# Patient Record
Sex: FEMALE | Race: WHITE | NOT HISPANIC OR LATINO | Employment: FULL TIME | URBAN - METROPOLITAN AREA
[De-identification: names, ages, dates, MRNs, and addresses within clinical notes are randomized per-mention and may not be internally consistent; named-entity substitution may affect disease eponyms.]

---

## 2017-02-23 ENCOUNTER — ALLSCRIPTS OFFICE VISIT (OUTPATIENT)
Dept: OTHER | Facility: OTHER | Age: 47
End: 2017-02-23

## 2017-02-23 PROCEDURE — G0145 SCR C/V CYTO,THINLAYER,RESCR: HCPCS | Performed by: OBSTETRICS & GYNECOLOGY

## 2017-02-23 PROCEDURE — 87624 HPV HI-RISK TYP POOLED RSLT: CPT | Performed by: OBSTETRICS & GYNECOLOGY

## 2017-02-24 ENCOUNTER — GENERIC CONVERSION - ENCOUNTER (OUTPATIENT)
Dept: OTHER | Facility: OTHER | Age: 47
End: 2017-02-24

## 2017-02-24 ENCOUNTER — LAB REQUISITION (OUTPATIENT)
Dept: LAB | Facility: HOSPITAL | Age: 47
End: 2017-02-24
Payer: COMMERCIAL

## 2017-02-24 DIAGNOSIS — Z11.51 ENCOUNTER FOR SCREENING FOR HUMAN PAPILLOMAVIRUS (HPV): ICD-10-CM

## 2017-02-24 DIAGNOSIS — Z12.4 ENCOUNTER FOR SCREENING FOR MALIGNANT NEOPLASM OF CERVIX: ICD-10-CM

## 2017-02-28 LAB — HPV RRNA GENITAL QL NAA+PROBE: ABNORMAL

## 2017-03-01 LAB
LAB AP GYN PRIMARY INTERPRETATION: NORMAL
LAB AP LMP: NORMAL
Lab: NORMAL
PATH INTERP SPEC-IMP: NORMAL

## 2017-03-02 ENCOUNTER — GENERIC CONVERSION - ENCOUNTER (OUTPATIENT)
Dept: OTHER | Facility: OTHER | Age: 47
End: 2017-03-02

## 2017-03-10 ENCOUNTER — GENERIC CONVERSION - ENCOUNTER (OUTPATIENT)
Dept: OTHER | Facility: OTHER | Age: 47
End: 2017-03-10

## 2017-03-15 ENCOUNTER — GENERIC CONVERSION - ENCOUNTER (OUTPATIENT)
Dept: OTHER | Facility: OTHER | Age: 47
End: 2017-03-15

## 2017-04-06 ENCOUNTER — ALLSCRIPTS OFFICE VISIT (OUTPATIENT)
Dept: OTHER | Facility: OTHER | Age: 47
End: 2017-04-06

## 2017-04-06 LAB — HCG, QUALITATIVE (HISTORICAL): NEGATIVE

## 2017-05-04 ENCOUNTER — HOSPITAL ENCOUNTER (OUTPATIENT)
Dept: MAMMOGRAPHY | Facility: MEDICAL CENTER | Age: 47
Discharge: HOME | End: 2017-05-04
Payer: COMMERCIAL

## 2017-05-04 DIAGNOSIS — Z12.31 ENCOUNTER FOR SCREENING MAMMOGRAM FOR MALIGNANT NEOPLASM OF BREAST: ICD-10-CM

## 2017-05-04 PROCEDURE — G0202 SCR MAMMO BI INCL CAD: HCPCS

## 2017-05-04 PROCEDURE — 77063 BREAST TOMOSYNTHESIS BI: CPT

## 2017-05-09 ENCOUNTER — TRANSCRIBE ORDERS (OUTPATIENT)
Dept: ADMINISTRATIVE | Facility: HOSPITAL | Age: 47
End: 2017-05-09

## 2017-05-09 ENCOUNTER — ALLSCRIPTS OFFICE VISIT (OUTPATIENT)
Dept: OTHER | Facility: OTHER | Age: 47
End: 2017-05-09

## 2017-05-09 DIAGNOSIS — Z12.39 SCREENING BREAST EXAMINATION: Primary | ICD-10-CM

## 2017-11-06 ENCOUNTER — ALLSCRIPTS OFFICE VISIT (OUTPATIENT)
Dept: OTHER | Facility: OTHER | Age: 47
End: 2017-11-06

## 2017-11-06 DIAGNOSIS — Z13.0 ENCOUNTER FOR SCREENING FOR DISEASES OF THE BLOOD AND BLOOD-FORMING ORGANS AND CERTAIN DISORDERS INVOLVING THE IMMUNE MECHANISM: ICD-10-CM

## 2017-11-06 DIAGNOSIS — Z13.220 ENCOUNTER FOR SCREENING FOR LIPOID DISORDERS: ICD-10-CM

## 2017-11-06 DIAGNOSIS — R87.610 ATYPICAL SQUAMOUS CELLS OF UNDETERMINED SIGNIFICANCE ON CYTOLOGIC SMEAR OF CERVIX (ASC-US): ICD-10-CM

## 2017-11-30 ENCOUNTER — ALLSCRIPTS OFFICE VISIT (OUTPATIENT)
Dept: OTHER | Facility: OTHER | Age: 47
End: 2017-11-30

## 2017-11-30 PROCEDURE — G0145 SCR C/V CYTO,THINLAYER,RESCR: HCPCS | Performed by: OBSTETRICS & GYNECOLOGY

## 2017-12-04 ENCOUNTER — LAB REQUISITION (OUTPATIENT)
Dept: LAB | Facility: HOSPITAL | Age: 47
End: 2017-12-04
Payer: COMMERCIAL

## 2017-12-04 DIAGNOSIS — R87.610 ATYPICAL SQUAMOUS CELLS OF UNDETERMINED SIGNIFICANCE ON CYTOLOGIC SMEAR OF CERVIX (ASC-US): ICD-10-CM

## 2017-12-05 NOTE — PROGRESS NOTES
Assessment    1  ASCUS with positive high risk HPV cervical (795 01,795 05) (R87 610,R87 810)    Discussion/Summary  Discussion Summary:   I explained that if she has anything other than a normal Pap and a negative HPV she needs to have a colposcopy  She is concerned because of her IUD, but I explained that a colposcopy and even a LEEP could be done with the IUD in place  Chief Complaint    1  Visit For: Preventive General Multisystem Exam    History of Present Illness  HPI: Patient here for Re Pap, she had ASCUS with positive HPV and I have recommended colposcopy but she was very reluctant to do so as she has had many abnormal Paps and colposcopies over the years  She had a LEEP many years ago and then in late 2013 she had EMILI 1-2 on colposcopy and we were planning to do another LEEP  She never had this done  When she came in the next time her Pap was normal  She did actually have 3 normal Paps but this year had ASCUS with positive HPV  Review of Systems  Focused-Female:   Constitutional: No fever, no chills, feels well, no tiredness, no recent weight gain or loss  ENT: no ear ache, no loss of hearing, no nosebleeds or nasal discharge, no sore throat or hoarseness  Cardiovascular: no complaints of slow or fast heart rate, no chest pain, no palpitations, no leg claudication or lower extremity edema  Respiratory: no complaints of shortness of breath, no wheezing, no dyspnea on exertion, no orthopnea or PND  Breasts: no complaints of breast pain, breast lump or nipple discharge  Gastrointestinal: no complaints of abdominal pain, no constipation, no nausea or diarrhea, no vomiting, no bloody stools  Genitourinary: no complaints of dysuria, no incontinence, no pelvic pain, no dysmenorrhea, no vaginal discharge or abnormal vaginal bleeding  Musculoskeletal: no complaints of arthralgia, no myalgia, no joint swelling or stiffness, no limb pain or swelling     Integumentary: no complaints of skin rash or lesion, no itching or dry skin, no skin wounds  Neurological: no complaints of headache, no confusion, no numbness or tingling, no dizziness or fainting  ROS Reviewed:   ROS reviewed  Active Problems    1  Annual physical exam (V70 0) (Z00 00)   2  Anxiety (300 00) (F41 9)   3  Breast cancer (174 9) (C50 919)   4  Breast cancer screening, high risk patient (V76 11) (Z12 31)   5  Breast self examination education, encounter for (V65 49) (Z71 89)   6  Encounter for IUD insertion (V25 11) (Z30 430)   7  Encounter for screening for diabetes mellitus (V77 1) (Z13 1)   8  Encounter for screening for lipid disorder (V77 91) (Z13 220)   9  Encounter for special screening examination for genitourinary disorder (V81 6) (Z13 89)   10  History of thrombocytosis (V12 3) (Z86 2)   11  Need for immunization against influenza (V04 81) (Z23)   12  Pap smear abnormality of cervix with ASCUS favoring benign (795 01) (R87 610)   13  Screening for deficiency anemia (V78 1) (Z13 0)   14  Screening for thyroid disorder (V77 0) (Z13 29)   15  Secondary amenorrhea (626 0) (N91 1)   16  Vitamin D deficiency (268 9) (E55 9)    Past Medical History    1  History of Abnormal Pap Smear: ASCUS Favor Dysplasia (795 02)   2  History of Age At First Period 17 Years Old (Menarche)   3  History of Bacterial vaginosis (616 10,041 9) (N76 0,B96 89)   4  Breast self examination education, encounter for (V65 49) (Z71 89)   5  History of Cervical cancer screening (V76 2) (Z12 4)   6  History of Encounter for cervical Pap smear with pelvic exam (V76 2,V72 31) (Z01 419)   7  History of  0 (V49 89)   8  History of infection due to human papilloma virus (HPV) (V12 09) (Z86 19)   9  History of ovarian cyst (V13 29) (Z87 42)   10  History of thrombocytosis (V12 3) (Z86 2)   11  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)   12  History of Moderate dysplasia of cervix (EMILI II) (622 12) (N87 1)   13  History of Never Pregnant   14  History of Radiation Therapy (V15 3)   15  History of Screening for HPV (human papillomavirus) (V73 81) (Z11 51)   16  History of Use of tamoxifen (Nolvadex) (V07 51) (Z79 810)   17  History of Visit for routine gyn exam (V72 31) (Z01 419)  Active Problems And Past Medical History Reviewed: The active problems and past medical history were reviewed and updated today  Surgical History    1  History of Breast Surgery Lumpectomy   2  History of Breast Surgery Reconstruction   3  History of Cervical Loop Electrosurgical Excision (LEEP)   4  History of Colposcopy Cervix With Biopsy(S) With Endocervical Curettage   5  History of [place] Lymph Node Biopsy  Surgical History Reviewed: The surgical history was reviewed and updated today  Family History  Mother    1  No pertinent family history  Father    2  No pertinent family history  Paternal Grandmother    1  Family history of Ovarian Cancer (V16 41)  Family History Reviewed: The family history was reviewed and updated today  Social History    · Being A Social Drinker   · Caffeine Use   · Former smoker (V1 82) (K41 358)   · Denied: History of    · Denied: History of drug use   · Marital History -  (V61 03)   · [address]   · Uses Safety Equipment - Seatbelts  Social History Reviewed: The social history was reviewed and updated today  Current Meds   1  Multivitamins CAPS; Therapy: (Recorded:[id number]) to Recorded  Medication List Reviewed: The medication list was reviewed and updated today  Allergies    1  Cephalexin CAPS    2  No Known Environmental Allergies   3  No Known Food Allergies    Vitals  Vital Signs    Recorded: 66ENG0182 19:93UR   Systolic 388, LUE, Sitting   Diastolic 75, LUE, Sitting   Height 5 ft 3 in   Weight 107 lb    BMI Calculated 18 95   BSA Calculated 1 48   LMP [id number]     Physical Exam    Genitourinary   External genitalia: Normal and no lesions appreciated      Vagina: Normal, no lesions or dryness appreciated  Urethra: Normal     Urethral meatus: Normal     Bladder: Normal, soft, non-tender and no prolapse or masses appreciated  Cervix: Normal, no palpable masses  strings visible  Uterus: Normal, non-tender, not enlarged, and no palpable masses  Adnexa/parametria: Normal, non-tender and no fullness or masses appreciated  Future Appointments    Date/Time Provider Specialty Site   05/21/2018 03:45 PM PHONG Payton  Surgical Oncology [facility] [place]     Signatures   Electronically signed by :  Rina Hay DO; Nov 30 2017  8:59PM EST                       (Author)

## 2017-12-11 LAB
LAB AP GYN PRIMARY INTERPRETATION: NORMAL
LAB AP LMP: NORMAL
Lab: NORMAL

## 2017-12-12 DIAGNOSIS — Z12.4 ENCOUNTER FOR SCREENING FOR MALIGNANT NEOPLASM OF CERVIX: ICD-10-CM

## 2017-12-12 DIAGNOSIS — Z00.00 ENCOUNTER FOR GENERAL ADULT MEDICAL EXAMINATION WITHOUT ABNORMAL FINDINGS: ICD-10-CM

## 2017-12-12 DIAGNOSIS — D75.1 SECONDARY POLYCYTHEMIA: ICD-10-CM

## 2017-12-12 PROCEDURE — 87624 HPV HI-RISK TYP POOLED RSLT: CPT | Performed by: OBSTETRICS & GYNECOLOGY

## 2017-12-13 ENCOUNTER — GENERIC CONVERSION - ENCOUNTER (OUTPATIENT)
Dept: OTHER | Facility: OTHER | Age: 47
End: 2017-12-13

## 2017-12-13 LAB — HPV I/H RISK 1 DNA CVX QL PROBE+SIG AMP: ABNORMAL

## 2017-12-22 ENCOUNTER — GENERIC CONVERSION - ENCOUNTER (OUTPATIENT)
Dept: OTHER | Facility: OTHER | Age: 47
End: 2017-12-22

## 2018-01-05 ENCOUNTER — ALLSCRIPTS OFFICE VISIT (OUTPATIENT)
Dept: OTHER | Facility: OTHER | Age: 48
End: 2018-01-05

## 2018-01-06 NOTE — PROGRESS NOTES
Assessment   1  Breast cancer (174 9) (C50 919)   2  Erythrocytosis (289 0) (D75 1)   3  Colon cancer screening (V76 51) (Z12 11)    Plan   Annual physical exam, Erythrocytosis    · (1) CBC/PLT/DIFF; Status:Active; Requested TXM:[id number];   Colon cancer screening    · *1 -  [facility]  *  Status: Active     Requested for: [id number]  Care Summary provided  : Yes    Discussion/Summary   Discussion Summary:    Recent gyn cancer Hematology Oncology consult were reviewed  Patient will be getting colposcopy  there is no clinical evidence of recurrence of breast cancer  Given breast cancer in early age and family members with history of colon cancer and ovarian cancer GI consultation for consideration of colonoscopy is recommended  Repeat lab studies as above  Counseling Documentation With Imm: The patient was counseled regarding diagnostic results,-- instructions for management,-- risk factor reductions,-- patient and family education,-- impressions,-- importance of compliance with treatment  total time of encounter was 25 minutes-- and-- >than50% minutes was spent counseling  Chief Complaint   Chief Complaint Free Text Note Form: Follow up      History of Present Illness   HPI: Patient with history of breast cancer stage I ER AL positive her 2- BRCA negative at the age of 42 status post partial mastectomy is here to follow-up on Routine appointment  her recent lab studies shows stable thrombocytosis with slightly elevated hemoglobin  patient denies any back pain dysuria frequency her urine test was negative for any hematuria  Patient believes she has not been drinking enough water  Review of Systems   Complete-Female:      Constitutional: no fever,-- no chills-- and-- no recent weight loss  Cardiovascular: no chest pain-- and-- no palpitations  Respiratory: no cough-- and-- no shortness of breath during exertion        Gastrointestinal: constipation, but-- no abdominal pain,-- no nausea-- and-- no diarrhea  Genitourinary: no dysuria-- and-- no unexplained vaginal bleeding  Neurological: no headache-- and-- no confusion  Hematologic/Lymphatic: as noted in HPI  Active Problems   1  Annual physical exam (V70 0) (Z00 00)   2  Anxiety (300 00) (F41 9)   3  ASCUS with positive high risk HPV cervical (795 01,795 05) (R87 610,R87 810)   4  Breast cancer (174 9) (C50 919)   5  Breast cancer screening, high risk patient (V76 11) (Z12 31)   6  Breast self examination education, encounter for (V65 49) (Z71 89)   7  Cervical cancer screening (V76 2) (Z12 4)   8  Encounter for IUD insertion (V25 11) (Z30 430)   9  Encounter for screening for diabetes mellitus (V77 1) (Z13 1)   10  Encounter for screening for lipid disorder (V77 91) (Z13 220)   11  Encounter for special screening examination for genitourinary disorder (V81 6) (Z13 89)   12  History of thrombocytosis (V12 3) (Z86 2)   13  Need for immunization against influenza (V04 81) (Z23)   14  Pap smear abnormality of cervix with ASCUS favoring benign (795 01) (R87 610)   15  Screening for deficiency anemia (V78 1) (Z13 0)   16  Screening for thyroid disorder (V77 0) (Z13 29)   17  Secondary amenorrhea (626 0) (N91 1)   18  Vitamin D deficiency (268 9) (E55 9)    Past Medical History   1  History of Abnormal Pap Smear: ASCUS Favor Dysplasia (795 02)   2  History of Age At First Period 17 Years Old (Menarche)   3  History of Bacterial vaginosis (616 10,041 9) (N76 0,B96 89)   4  Breast self examination education, encounter for (V65 49) (Z71 89)   5  History of Cervical cancer screening (V76 2) (Z12 4)   6  History of Encounter for cervical Pap smear with pelvic exam (V76 2,V72 31) (Z01 419)   7  History of  0 (V49 89)   8  History of infection due to human papilloma virus (HPV) (V12 09) (Z86 19)   9  History of ovarian cyst (V13 29) (Z87 42)   10   History of thrombocytosis (V12 3) (Z86 2)   11  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)   12  History of Moderate dysplasia of cervix (EMILI II) (622 12) (N87 1)   13  History of Never Pregnant   14  History of Radiation Therapy (V15 3)   15  History of Screening for HPV (human papillomavirus) (V73 81) (Z11 51)   16  History of Use of tamoxifen (Nolvadex) (V07 51) (Z79 810)   17  History of Visit for routine gyn exam (V72 31) (Z01 419)  Active Problems And Past Medical History Reviewed: The active problems and past medical history were reviewed and updated today  Surgical History   1  History of Breast Surgery Lumpectomy   2  History of Breast Surgery Reconstruction   3  History of Cervical Loop Electrosurgical Excision (LEEP)   4  History of Colposcopy Cervix With Biopsy(S) With Endocervical Curettage   5  History of [place] Lymph Node Biopsy  Surgical History Reviewed: The surgical history was reviewed and updated today  Family History   Mother    1  No pertinent family history  Father    2  No pertinent family history  Paternal Grandmother    1  Family history of Ovarian Cancer (V16 41)  Family History Reviewed: The family history was reviewed and updated today  Social History    · Being A Social Drinker   · Caffeine Use   · Former smoker (V15 82) (J57 131)   · Denied: History of    · Denied: History of drug use   · Marital History -  (V61 03)   · [address]   · Uses Safety Equipment - Seatbelts  Social History Reviewed: The social history was reviewed and updated today  Current Meds    1  Multivitamins CAPS; Therapy: (Recorded:67Uyi2142) to Recorded  Medication List Reviewed: The medication list was reviewed and updated today  Allergies   1  Cephalexin CAPS  2  No Known Environmental Allergies   3   No Known Food Allergies    Vitals   Vital Signs    Recorded: 36UDQ2978 02:38PM   Heart Rate 80   Systolic 803   Diastolic 58   Height 5 ft 3 in   Weight 110 lb 6 oz   BMI Calculated 19 55   BSA Calculated 1 5   O2 Saturation 99     Physical Exam        Constitutional      General appearance: No acute distress, well appearing and well nourished  Eyes      Conjunctiva and lids: No swelling, erythema or discharge  Pupils and irises: Equal, round and reactive to light  Ears, Nose, Mouth, and Throat      Otoscopic examination: Tympanic membranes translucent with normal light reflex  Canals patent without erythema  Oropharynx: Normal with no erythema, edema, exudate or lesions  Pulmonary      Respiratory effort: No increased work of breathing or signs of respiratory distress  Auscultation of lungs: Clear to auscultation  Cardiovascular      Auscultation of heart: Normal rate and rhythm, normal S1 and S2, without murmurs  Examination of extremities for edema and/or varicosities: Normal        Carotid pulses: Normal        Abdomen      Abdomen: Non-tender, no masses  Liver and spleen: No hepatomegaly or splenomegaly  Lymphatic      Palpation of lymph nodes in neck: No lymphadenopathy  -- No thyromegaly  Musculoskeletal      Gait and station: Normal        Digits and nails: Normal without clubbing or cyanosis  Inspection/palpation of joints, bones, and muscles: Normal        Neurologic      Cranial nerves: Cranial nerves 2-12 intact  Reflexes: 2+ and symmetric  Sensation: No sensory loss  Psychiatric      Orientation to person, place, and time: Normal        Mood and affect: Abnormal  -- Anxious on talking about past medical history  Results/Data   PHQ-2 Adult Depression Screening 20MJB6037 02:40PM User, s      Test Name Result Flag Reference   PHQ-2 Adult Depression Score 0     Over the last two weeks, how often have you been bothered by any of the following problems?      Little interest or pleasure in doing things: Not at all - 0     Feeling down, depressed, or hopeless: Not at all - 0   PHQ-2 Adult Depression Screening Negative          Future Appointments      Date/Time Provider Specialty Site   02/15/2018 04:30 PM Rose Yuan DO Obstetrics/Gynecology [facility]   05/21/2018 03:45 PM PHONG Silva   Surgical Oncology [facility] [place]     Signatures    Electronically signed by : PHONG Vanegas ; Jan 5 2018  4:51PM EST                       (Author)

## 2018-01-12 VITALS
DIASTOLIC BLOOD PRESSURE: 64 MMHG | WEIGHT: 107 LBS | BODY MASS INDEX: 18.96 KG/M2 | HEIGHT: 63 IN | TEMPERATURE: 99.3 F | SYSTOLIC BLOOD PRESSURE: 114 MMHG | OXYGEN SATURATION: 98 % | RESPIRATION RATE: 15 BRPM | HEART RATE: 81 BPM

## 2018-01-12 NOTE — MISCELLANEOUS
Message   Recorded as Task   Date: 02/23/2017 04:31 PM, Created By: Sallie Goldman   Task Name: Go to Result   Assigned To: Nidia Dinh   Regarding Patient: NAIN DANIELS, Status: In Progress   Comment:    Sallie Goldman - 23 Feb 2017 4:31 PM     TASK CREATED  Ang, can you please check on [employer] coverage for Rajani? She is perimenopausal but does not want to pursue pregnancy  She had a paragard in the past   She will need a pregnancy test prior to insertion  thanks   [facility] - 24 Feb 2017 8:46 AM     TASK EDITED  Spoke to Earlene at [facility], [employer] iud is covered under plan  NO ded or oop  No auth needed  [facility] - 24 Feb 2017 8:47 AM     TASK EDITED  lm for pt   [facility] - 24 Feb 2017 8:47 AM     TASK IN PROGRESS   [facility] - 24 Feb 2017 9:31 AM     TASK EDITED  Patient called back, she wishes to proceed with iud insertion  Appointment given with Dr Mireille King for insertion  Active Problems    1  Abnormal Pap Smear: ASCUS Favor Dysplasia (795 02)   2  Anxiety (300 00) (F41 9)   3  Breast cancer (174 9) (C50 919)   4  Breast self examination education, encounter for (V65 49) (Z71 89)   5  Cervical cancer screening (V76 2) (Z12 4)   6  History of thrombocytosis (V12 3) (Z86 2)   7  Human papilloma virus infection (079 4) (B97 7)   8  Moderate dysplasia of cervix (EMILI II) (622 12) (N87 1)   9  Ovarian cyst (620 2) (N83 20)   10  Pap smear abnormality of cervix with ASCUS favoring benign (795 01) (R87 610)   11  Screening for HPV (human papillomavirus) (V73 81) (Z11 51)   12  Secondary amenorrhea (626 0) (N91 1)    Current Meds   1  Multivitamins CAPS; Therapy: (Recorded:39Nwn5635) to Recorded    Allergies    1  Cephalexin CAPS    2  No Known Environmental Allergies   3  No Known Food Allergies    Signatures   Electronically signed by :  Jonh Diggs, ; Feb 24 2017  9:31AM EST                       (Author)

## 2018-01-13 VITALS
SYSTOLIC BLOOD PRESSURE: 118 MMHG | WEIGHT: 113 LBS | HEIGHT: 63 IN | DIASTOLIC BLOOD PRESSURE: 62 MMHG | BODY MASS INDEX: 20.02 KG/M2

## 2018-01-13 NOTE — MISCELLANEOUS
Message  spoke with pt on the phone - she had an ASCUS pap with + HPV, I recommended colposcopy  She is hesitant to do this because she had 2 LEEPs in the past and her past 3 paps have been normal, although she did not have cotesting until this year  I reviewed her history with her in detail  She really does not want to do a colposcopy now, will repap in 6 months and then decide how to proceed  Signatures   Electronically signed by :  Len Villa DO; Mar 10 2017 11:01AM EST                       (Author)

## 2018-01-14 VITALS
WEIGHT: 110.25 LBS | HEIGHT: 63 IN | BODY MASS INDEX: 19.54 KG/M2 | SYSTOLIC BLOOD PRESSURE: 110 MMHG | DIASTOLIC BLOOD PRESSURE: 70 MMHG

## 2018-01-14 VITALS
BODY MASS INDEX: 18.96 KG/M2 | SYSTOLIC BLOOD PRESSURE: 120 MMHG | WEIGHT: 107 LBS | DIASTOLIC BLOOD PRESSURE: 75 MMHG | HEIGHT: 63 IN

## 2018-01-16 NOTE — PROGRESS NOTES
Assessment    1  Annual physical exam (V70 0) (Z00 00)   2  Former smoker (V15 82) (S30 474)   3  Breast cancer (174 9) (C50 919)   4  Encounter for IUD insertion (V25 11) (Z30 430)   5  History of thrombocytosis (V12 3) (Z86 2)   6  Vitamin D deficiency (268 9) (E55 9)   7  Encounter for special screening examination for genitourinary disorder (V81 6) (Z13 89)   8  Encounter for screening for diabetes mellitus (V77 1) (Z13 1)   9  Screening for thyroid disorder (V77 0) (Z13 29)   10  Encounter for screening for lipid disorder (V77 91) (Z13 220)   11  Screening for deficiency anemia (V78 1) (Z13 0)    Plan  Encounter for screening for lipid disorder    · (1) COMPREHENSIVE METABOLIC PANEL; Status:Active; Requested CZF:[id number];   Need for immunization against influenza    · Fluzone Quadrivalent 0 5 ML Intramuscular Suspension Prefilled Syringe  Screening for deficiency anemia    · (1) CBC/PLT/DIFF; Status:Active; Requested DJK:[id number];     Discussion/Summary  healthy adult female Currently, she eats a healthy diet  cervical cancer screening is current Breast cancer screening: breast cancer screening is managed by Dr Juan Miguel Ronquillo  Colorectal cancer screening: the risks and benefits of colorectal cancer screening were discussed  Osteoporosis screening: bone mineral density testing is not indicated  Screening lab work includes hemoglobin, glucose, lipid profile, thyroid function testing, 25-hydroxyvitamin D and urinalysis  The immunizations will be given as outlined in the orders  Patient discussion: discussed with the patient  Patient's history of breast cancer status post right lumpectomy for infiltrative ductal carcinoma T1 N 0 ER WA positive, her 2 negative treated with lumpectomy adjuvant radiation treatment and received amoxicillin for 5 years stopping in 2014 is IVONE under care of Dr Juan Miguel Ronquillo  Her thrombocytopenia is also stable and has been reviewed with Dr Hernández in the past   Lab studies ordered as above   I will see her back in a year  Chief Complaint  yearly well visit  History of Present Illness  HM, Adult Female: The patient is being seen for a health maintenance evaluation  General Health: The patient's health since the last visit is described as good  She has regular dental visits  She denies vision problems  She denies hearing loss  Lifestyle:  She consumes a diverse and healthy diet  She does not have any weight concerns  She exercises regularly  She does not use tobacco  She denies drug use  Reproductive health: the patient is premenopausal   she reports normal menses  Screening:      Review of Systems    Constitutional: no fever, not feeling poorly, no recent weight gain, no chills and no recent weight loss  Eyes: no eyesight problems  ENT: no sore throat, no hearing loss and no hoarseness  Cardiovascular: palpitations, but no lower extremity edema  Respiratory: no shortness of breath, no cough and no shortness of breath during exertion  Gastrointestinal: no abdominal pain, no nausea, no diarrhea and no blood in stools  Genitourinary: no dysuria, no pelvic pain and no unexplained vaginal bleeding  Musculoskeletal: no arthralgias, no joint swelling and no joint stiffness  Integumentary: no rashes and no itching  Neurological: no headache, no numbness, no confusion and no dizziness  Psychiatric: not suicidal, no personality change and no depression  Endocrine: no deepening of the voice  Hematologic/Lymphatic: no tendency for easy bleeding  Active Problems    1  Anxiety (300 00) (F41 9)   2  Breast cancer (174 9) (C50 919)   3  Breast cancer screening, high risk patient (V76 11) (Z12 31)   4  Breast self examination education, encounter for (V65 49) (Z71 89)   5  Encounter for IUD insertion (V25 11) (Z30 430)   6  History of thrombocytosis (V12 3) (Z86 2)   7  Pap smear abnormality of cervix with ASCUS favoring benign (795 01) (R87 610)   8   Secondary amenorrhea (626  0) (N91 1)    Past Medical History    · History of Abnormal Pap Smear: ASCUS Favor Dysplasia (795 02)   · History of Age At First Period 17 Years Old (Menarche)   · History of Bacterial vaginosis (616 10,041 9) (N76 0,B96 89)   · Breast self examination education, encounter for (V65 49) (Z71 89)   · History of Cervical cancer screening (V76 2) (Z12 4)   · History of Encounter for cervical Pap smear with pelvic exam (V76 2,V72 31) (Z01 419)   · History of  0 (V49 89)   · History of infection due to human papilloma virus (HPV) (V12 09) (Z86 19)   · History of ovarian cyst (V13 29) (Z87 42)   · History of thrombocytosis (V12 3) (Z86 2)   · 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)   · History of Moderate dysplasia of cervix (EMILI II) (622 12) (N87 1)   · History of Never Pregnant   · History of Radiation Therapy (V15 3)   · History of Screening for HPV (human papillomavirus) (V73 81) (Z11 51)   · History of Use of tamoxifen (Nolvadex) (V07 51) (Z79 810)   · History of Visit for routine gyn exam (V72 31) (Z01 419)    Surgical History    · History of Breast Surgery Lumpectomy   · History of Breast Surgery Reconstruction   · History of Cervical Loop Electrosurgical Excision (LEEP)   · History of Colposcopy Cervix With Biopsy(S) With Endocervical Curettage   · History of [place] Lymph Node Biopsy    Family History  Mother    · No pertinent family history  Father    · No pertinent family history  Paternal Grandmother    · Family history of Ovarian Cancer (V16 41)    Social History    · Being A Social Drinker   · Caffeine Use   · Former smoker (V15 82) (Q39 602)   · Denied: History of    · Denied: History of drug use   · Marital History -  (V61 03)   · [employer] Affiliation [employer]   · Uses Safety Equipment - Seatbelts    Current Meds   1  Multivitamins CAPS; Therapy: (Recorded:61Zzb9783) to Recorded    Allergies    1  Cephalexin CAPS    2  No Known Environmental Allergies   3   No Known Food Allergies    Vitals   Recorded: 20BEN5002 03:41PM   Heart Rate 80   Systolic 871, LUE, Sitting   Diastolic 86, LUE, Sitting   Height 5 ft 3 in   Weight 107 lb 8 oz   BMI Calculated 19 04   BSA Calculated 1 49   O2 Saturation 96     Physical Exam    Constitutional   General appearance: No acute distress, well appearing and well nourished  Head and Face   Head and face: Normal     Palpation of the face and sinuses: No sinus tenderness  Eyes   Conjunctiva and lids: No swelling, erythema or discharge  Ears, Nose, Mouth, and Throat   Hearing: Normal     Oropharynx: Normal with no erythema, edema, exudate or lesions  Neck   Neck: Supple, symmetric, trachea midline, no masses  Thyroid: Normal, no thyromegaly  Pulmonary   Respiratory effort: No increased work of breathing or signs of respiratory distress  Auscultation of lungs: Clear to auscultation  Cardiovascular   Auscultation of heart: Normal rate and rhythm, normal S1 and S2, no murmurs  Carotid pulses: 2+ bilaterally  Abdominal aorta: Normal     Pedal pulses: 2+ bilaterally  Examination of extremities for edema and/or varicosities: Normal     Chest defer gyn  Abdomen   Abdomen: Non-tender, no masses  Liver and spleen: No hepatomegaly or splenomegaly  Genitourinary defer gyn  Lymphatic   Palpation of lymph nodes in neck: No lymphadenopathy  Palpation of lymph nodes in axillae: No lymphadenopathy  Musculoskeletal   Gait and station: Normal     Digits and nails: Normal without clubbing or cyanosis  Joints, bones, and muscles: Normal     Range of motion: Normal     Skin   Skin and subcutaneous tissue: Abnormal   freckles-follows dermaology  Neurologic   Cranial nerves: Cranial nerves II-XII intact  Cortical function: Normal mental status  Reflexes: 2+ and symmetric  Coordination: Normal finger to nose and heel to shin      Psychiatric   Judgment and insight: Normal     Orientation to person, place, and time: Normal     Recent and remote memory: Intact  Mood and affect: Normal        Future Appointments    Date/Time Provider Specialty Site   11/30/2017 03:15 PM Sherie Munguia DO Obstetrics/Gynecology [facility]   05/21/2018 03:45 PM PHONG Dasilva   Surgical Oncology [facility] [place]     Signatures   Electronically signed by : PHONG No ; Nov 6 2017  4:58PM EST                       (Author)

## 2018-01-17 NOTE — MISCELLANEOUS
Message   Recorded as Task   Date: 03/02/2017 11:18 AM, Created By: Kyle Aly   Task Name: Care Coordination   Assigned To: Gretchen Ang   Regarding Patient: NAIN DANIELS, Status: Active   Comment:    Gretchen Ang - 02 Mar 2017 11:18 AM     TASK CREATED  please check to see if pt scheduled colpo   Gretchen Ang - 09 Mar 2017 9:14 AM     TASK REASSIGNED: Previously Assigned To Gretchen Ang  I checked and pt did not schedule her colpo yet    I called her and she said that she needed to talk to you first  Did you have a message to call her? [facility] - 09 Mar 2017 1:03 PM     TASK REPLIED TO: Previously Assigned To Ck Jose Carlos    She had a LEEP in 2013, paps were normal since then but she now has a + HPV that is why I recommended a colpo, does she want me to call her? I wasn't aware   Kyle Aly - 09 Mar 2017 1:12 PM     TASK REPLIED TO: Previously Assigned To Gretchen Ang   She said that she left a message for you to call her and she wasn't scheduling the colpo until you did    I do not know who she talked to    It was not me  Camila Matson - 10 Mar 2017 8:41 AM     TASK REPLIED TO: Previously Assigned To [facility]  ok, I didn't get it, I will call her   Gretchen Ang - 15 Mar 2017 7:13 AM     TASK REASSIGNED: Previously Assigned To Kyle Aly  Did you call her because I do not see an appt for colpo   [facility] - 15 Mar 2017 2:06 PM     TASK REPLIED TO: Previously Assigned To [facility]                      I did, I talked to her, she is coming in for a repap in 6 months, I put a note in her chart        Active Problems    1  Abnormal Pap Smear: ASCUS Favor Dysplasia (795 02)   2  Anxiety (300 00) (F41 9)   3  Bacterial vaginosis (616 10,041 9) (N76 0,B96 89)   4  Breast cancer (174 9) (C50 919)   5  Breast self examination education, encounter for (V65 49) (Z71 89)   6  Cervical cancer screening (V76 2) (Z12 4)   7   Encounter for cervical Pap smear with pelvic exam (V76 2,V72 31) (Z01 419)   8  History of thrombocytosis (V12 3) (Z86 2)   9  Human papilloma virus infection (079 4) (B97 7)   10  Moderate dysplasia of cervix (EMILI II) (622 12) (N87 1)   11  Ovarian cyst (620 2) (N83 20)   12  Pap smear abnormality of cervix with ASCUS favoring benign (795 01) (R87 610)   13  Screening for HPV (human papillomavirus) (V73 81) (Z11 51)   14  Secondary amenorrhea (626 0) (N91 1)   15  Visit for routine gyn exam (V72 31) (Z01 419)    Current Meds   1  MetroNIDAZOLE 0 75 % Vaginal Gel; Insert nightly x 7 nights; Therapy: 00AAC8626 to (Last Rx:02Mar2017)  Requested for: 47ZQA6618 Ordered   2  Multivitamins CAPS; Therapy: (Recorded:86Dql3376) to Recorded    Allergies    1  Cephalexin CAPS    2  No Known Environmental Allergies   3   No Known Food Allergies    Signatures   Electronically signed by : Dave Haro, ; Mar 15 2017  2:53PM EST                       (Author)

## 2018-01-18 NOTE — PROGRESS NOTES
Assessment    1  Breast cancer (174 9) (C50 919)   2  History of thrombocytosis (V12 3) (Z86 2)    Plan  History of thrombocytosis    · Drink plenty of fluids ; Status:Complete;   Done: 47YGS5260   Ordered;  For:History of thrombocytosis; Ordered By:Jessica Mcclain;   · Follow-up PRN Evaluation and Treatment  Follow-up  Status: Complete  Done:  94JDH3846   Ordered; For: History of thrombocytosis; Ordered By: Alexander Gurrola Performed:  Due: 18DOS9405    Discussion/Summary  Discussion Summary:   In summary, this is a 61-year-old female history of thrombocytosis  In the past  This was a transient finding  Over the past 2 years or so  Her platelet count has been essentially normal  Her white count, differential, hemoglobin have been likewise  It is difficult to reconcile this pattern with the finding of a positive  Jorge-2 mutation  I suspect that she has an underlying myeloproliferative disorder, but that this is behaving quite indolently  She continues on aspirin 81 mg by mouth daily  Observation remains appropriate  In an effort to streamline her care and minimize extraneous doctor visits  She is agreeable to continue followup under her PCP  She does to see Dr Parul Antoine approximately once a year  I would anticipate a CBC be carried out with that visit  If further thrombocytosis occurred in the future and be happy to see her back in return visit  For the moment  No treatment is required  I reviewed the above with the patient  She voiced understanding and agreement  Understands and agrees with treatment plan: The treatment plan was reviewed with the patient/guardian  The patient/guardian understands and agrees with the treatment plan   Counseling Documentation With Imm: The patient was counseled regarding diagnostic results, instructions for management, patient and family education, impressions  total time of encounter was 25 minutes        History of Present Illness  HPI: #1-June 2009-right breast lumpectomy for T1 B  N0, ER/GA positive, HER-2 negative, infiltrating ductal carcinoma  BRCA testing was negative  Adjuvant radiation administered  In July 2009 tamoxifen 20 mg by mouth daily, begun  [facility] July 2014  #2- Thrombocytosis, Jorge-2 mutation positive  Interval History: Hot flashes for a while, then stopped  Menses irregular  Review of Systems  Complete-Female:   Constitutional: No fever, no chills, feels well, no tiredness, no recent weight gain or weight loss  Eyes: No complaints of eye pain, no red eyes, no eyesight problems, no discharge, no dry eyes, no itching of eyes  ENT: no complaints of earache, no loss of hearing, no nose bleeds, no nasal discharge, no sore throat, no hoarseness  Cardiovascular: No complaints of slow heart rate, no fast heart rate, no chest pain, no palpitations, no leg claudication, no lower extremity edema  Respiratory: No complaints of shortness of breath, no wheezing, no cough, no SOB on exertion, no orthopnea, no PND  Gastrointestinal: No complaints of abdominal pain, no constipation, no nausea or vomiting, no diarrhea, no bloody stools  Genitourinary: No complaints of dysuria, no incontinence, no pelvic pain, no dysmenorrhea, no vaginal discharge or bleeding  Musculoskeletal: No complaints of arthralgias, no myalgias, no joint swelling or stiffness, no limb pain or swelling  Integumentary: No complaints of skin rash or lesions, no itching, no skin wounds, no breast pain or lump  Neurological: No complaints of headache, no confusion, no convulsions, no numbness, no dizziness or fainting, no tingling, no limb weakness, no difficulty walking  Psychiatric: Not suicidal, no sleep disturbance, no anxiety or depression, no change in personality, no emotional problems  Endocrine: No complaints of proptosis, no hot flashes, no muscle weakness, no deepening of the voice, no feelings of weakness     Hematologic/Lymphatic: No complaints of swollen glands, no swollen glands in the neck, does not bleed easily, does not bruise easily  Active Problems    1  Abnormal Pap Smear: ASCUS Favor Dysplasia (795 02)   2  Anxiety (300 00) (F41 9)   3  Breast cancer (174 9) (C50 919)   4  Breast self examination education, encounter for (V65 49) (Z71 89)   5  Cervical cancer screening (V76 2) (Z12 4)   6  Encounter for cervical Pap smear with pelvic exam (V76 2,V72 31) (Z01 419)   7  Encounter to establish care with new doctor (V65 8) (Z71 89)   8  History of thrombocytosis (V12 3) (Z86 2)   9  Human papilloma virus infection (079 4) (B97 7)   10  Moderate dysplasia of cervix (EMILI II) (622 12) (N87 1)   11  Ovarian cyst (620 2) (N83 20)   12  Pap smear abnormality of cervix with ASCUS favoring benign (795 01) (R87 610)   13  Screening mammogram for high-risk patient (V76 11) (Z12 31)   14  Secondary amenorrhea (626 0) (N91 1)   15  Visit for routine gyn exam (V72 31) (Z01 419)    Past Medical History    1  History of Age At First Period 17 Years Old (Menarche)   2  Breast self examination education, encounter for (V65 49) (Z71 89)   3  History of  0 (V49 89) (Z78 9)   4  History of thrombocytosis (V12 3) (Z86 2)   5  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)   6  History of Never Pregnant   7  History of Radiation Therapy (V15 3)   8  History of Use of tamoxifen (Nolvadex) (V07 51) (Z79 810)    Surgical History    1  History of Breast Surgery Lumpectomy   2  History of Breast Surgery Reconstruction   3  History of Cervical Loop Electrosurgical Excision (LEEP)   4  History of Colposcopy Cervix With Biopsy(S) With Endocervical Curettage   5  History of [place] Lymph Node Biopsy    Family History  Mother    1  No pertinent family history  Father    2  No pertinent family history  Paternal Grandmother    1   Family history of Ovarian Cancer (V16 41)    Social History    · Being A Social Drinker   · Caffeine Use   · Does not use birth control   · Former smoker (V15 82) (R42 011)   · Denied: History of    · Marital History -  (V61 03)   · Muslim Affiliation [employer]   · Uses Safety Equipment - Seatbelts    Current Meds   1  Aspirin 81 MG Oral Tablet Delayed Release; daily; Therapy: 28IVC3860 to (Last Murleen Hallmark)  Requested for: 2012 Ordered   2  Multivitamins CAPS; Therapy: (Recorded:16Vxh0343) to Recorded    Allergies    1  Cephalexin CAPS    2  No Known Environmental Allergies   3  No Known Food Allergies    Vitals  Vital Signs [Data Includes: Current Encounter]    Recorded: 2016 04:02PM   Temperature 98 F   Heart Rate 78   Respiration 16   Systolic 308   Diastolic 70   Height 5 ft 3 in   Weight 111 lb 8 0 oz   BMI Calculated 19 75   BSA Calculated 1 51   O2 Saturation 98     Physical Exam    Constitutional   General appearance: No acute distress, well appearing and well nourished  Eyes   Conjunctiva and lids: No swelling, erythema or discharge  Ears, Nose, Mouth, and Throat   External inspection of ears and nose: Normal     Oropharynx: Normal with no erythema, edema, exudate or lesions  Pulmonary   Auscultation of lungs: Clear to auscultation  Cardiovascular   Auscultation of heart: Normal rate and rhythm, normal S1 and S2, without murmurs  Examination of extremities for edema and/or varicosities: Normal     Abdomen   Abdomen: Non-tender, no masses  Liver and spleen: No hepatomegaly or splenomegaly  Lymphatic   Palpation of lymph nodes in neck: No lymphadenopathy  Musculoskeletal   Gait and station: Normal     Skin   Skin and subcutaneous tissue: Normal without rashes or lesions  Neurologic   Cranial nerves: Cranial nerves 2-12 intact      Psychiatric   Orientation to person, place, and time: Normal          Results/Data  Results   (1) CBC/PLT/DIFF 2016 02:08PM [place] [facility]   REPORT COMMENT:  FASTING:NO     Test Name Result Flag Reference   WHITE BLOOD CELL COUNT 7 4 Thousand/uL  3 8-10 8   RED BLOOD CELL COUNT 4 85 Million/uL  3 80-5 10   HEMOGLOBIN 13 8 g/dL  11 7-15 5   HEMATOCRIT 43 4 %  35 0-45 0   MCV 89 5 fL  80 0-100 0   MCH 28 4 pg  27 0-33 0   MCHC 31 8 g/dL L 32 0-36 0   RDW 15 2 % H 11 0-15 0   PLATELET COUNT 171 Thousand/uL  140-400   MPV 8 4 fL  7 5-11 5   ABSOLUTE NEUTROPHILS 4906 cells/uL  8639-6774   ABSOLUTE LYMPHOCYTES 1820 cells/uL  850-3900   ABSOLUTE MONOCYTES 496 cells/uL  200-950   ABSOLUTE EOSINOPHILS 118 cells/uL     ABSOLUTE BASOPHILS 59 cells/uL  0-200   NEUTROPHILS 66 3 %     LYMPHOCYTES 24 6 %     MONOCYTES 6 7 %     EOSINOPHILS 1 6 %     BASOPHILS 0 8 %       Future Appointments    Date/Time Provider Specialty Site   05/11/2016 03:45 PM PHONG Webb  Surgical Oncology [facility]   12/20/2016 03:20 PM PHONG Contreras  Internal [facility]     Signatures   Electronically signed by : PHONG Will DOM D ,DO;  Apr 19 2016  5:03PM EST                       (Author)

## 2018-01-22 VITALS
HEART RATE: 80 BPM | WEIGHT: 107.5 LBS | HEIGHT: 63 IN | SYSTOLIC BLOOD PRESSURE: 120 MMHG | BODY MASS INDEX: 19.05 KG/M2 | DIASTOLIC BLOOD PRESSURE: 86 MMHG | OXYGEN SATURATION: 96 %

## 2018-01-23 VITALS
WEIGHT: 110.38 LBS | HEART RATE: 80 BPM | HEIGHT: 63 IN | BODY MASS INDEX: 19.56 KG/M2 | SYSTOLIC BLOOD PRESSURE: 110 MMHG | OXYGEN SATURATION: 99 % | DIASTOLIC BLOOD PRESSURE: 58 MMHG

## 2018-01-23 NOTE — MISCELLANEOUS
Message   Recorded as Task   Date: 12/12/2017 08:10 AM, Created By: Mireya Doty   Task Name: Go to Result   Assigned To: Jose Juan Reno   Regarding Patient: NAIN DANIELS, Status: Active   Comment:    Mireya Doty - 12 Dec 2017 8:10 AM     TASK CREATED  this was supposed to be a pap and HPV, is the HPV just pending? AshiaGretchen - 12 Dec 2017 1:23 PM     TASK REPLIED TO: Previously Assigned To Jose Juan Reno  I just sent a new order to cytology and they will add it regardless of interpretation  Willow Lakerin Perez - 13 Dec 2017 7:56 AM     TASK REPLIED TO: Previously Assigned To Mireya Doty  thank you        Active Problems    1  Annual physical exam (V70 0) (Z00 00)   2  Anxiety (300 00) (F41 9)   3  ASCUS with positive high risk HPV cervical (795 01,795 05) (R87 610,R87 810)   4  Breast cancer (174 9) (C50 919)   5  Breast cancer screening, high risk patient (V76 11) (Z12 31)   6  Breast self examination education, encounter for (V65 49) (Z71 89)   7  Cervical cancer screening (V76 2) (Z12 4)   8  Encounter for IUD insertion (V25 11) (Z30 430)   9  Encounter for screening for diabetes mellitus (V77 1) (Z13 1)   10  Encounter for screening for lipid disorder (V77 91) (Z13 220)   11  Encounter for special screening examination for genitourinary disorder (V81 6) (Z13 89)   12  History of thrombocytosis (V12 3) (Z86 2)   13  Need for immunization against influenza (V04 81) (Z23)   14  Pap smear abnormality of cervix with ASCUS favoring benign (795 01) (R87 610)   15  Screening for deficiency anemia (V78 1) (Z13 0)   16  Screening for thyroid disorder (V77 0) (Z13 29)   17  Secondary amenorrhea (626 0) (N91 1)   18  Vitamin D deficiency (268 9) (E55 9)    Current Meds   1  Multivitamins CAPS; Therapy: (Recorded:79Cug2639) to Recorded    Allergies    1  Cephalexin CAPS    2  No Known Environmental Allergies   3   No Known Food Allergies    Signatures   Electronically signed by : Sachin Renner ; Dec 13 2017  8:05AM EST                       (Author)

## 2018-01-23 NOTE — MISCELLANEOUS
Message   Recorded as Task   Date: 12/14/2017 12:50 PM, Created By: Renata Roberson   Task Name: Go to Result   Assigned To: Ivan Machado   Regarding Patient: NAIN DANIELS, Status: In Progress   Comment:    Tienjeanie Karol - 14 Dec 2017 12:50 PM     TASK CREATED  pap is normal but HPV 16 is positive, needs a colposcopy as we discussed at her last visit   Wan Wu - 14 Dec 2017 3:30 PM     TASK IN [address] - 22 Dec 2017 8:15 AM     TASK EDITED  left several messages for pt    she did not return calls    mailed pt a nurse letter      PT CALLED BACK AND SCHEDULED THE COLPOSCOPY   1        1 Amended By: Wan Wu; Dec 27 2017 1:02 PM EST    Active Problems   1  Annual physical exam (V70 0) (Z00 00)  2  Anxiety (300 00) (F41 9)  3  ASCUS with positive high risk HPV cervical (795 01,795 05) (R87 610,R87 810)  4  Breast cancer (174 9) (C50 919)  5  Breast cancer screening, high risk patient (V76 11) (Z12 31)  6  Breast self examination education, encounter for (V65 49) (Z71 89)  7  Cervical cancer screening (V76 2) (Z12 4)  8  Encounter for IUD insertion (V25 11) (Z30 430)  9  Encounter for screening for diabetes mellitus (V77 1) (Z13 1)  10  Encounter for screening for lipid disorder (V77 91) (Z13 220)  11  Encounter for special screening examination for genitourinary disorder (V81 6) (Z13 89)  12  History of thrombocytosis (V12 3) (Z86 2)  13  Need for immunization against influenza (V04 81) (Z23)  14  Pap smear abnormality of cervix with ASCUS favoring benign (795 01) (R87 610)  15  Screening for deficiency anemia (V78 1) (Z13 0)  16  Screening for thyroid disorder (V77 0) (Z13 29)  17  Secondary amenorrhea (626 0) (N91 1)  18  Vitamin D deficiency (268 9) (E55 9)    Current Meds  1  Multivitamins CAPS; Therapy: (Recorded:20Hrx3453) to Recorded    Allergies   1  Cephalexin CAPS   2  No Known Environmental Allergies  3   No Known Food Allergies    Signatures   Electronically signed by Chrystal Godwin, ; Dec 27 2017  1:02PM EST                       (Author)

## 2018-02-15 ENCOUNTER — PROCEDURE VISIT (OUTPATIENT)
Dept: OBGYN CLINIC | Facility: CLINIC | Age: 48
End: 2018-02-15
Payer: COMMERCIAL

## 2018-02-15 VITALS
BODY MASS INDEX: 18.92 KG/M2 | SYSTOLIC BLOOD PRESSURE: 118 MMHG | DIASTOLIC BLOOD PRESSURE: 64 MMHG | HEIGHT: 64 IN | WEIGHT: 110.8 LBS

## 2018-02-15 DIAGNOSIS — Z86.19 HISTORY OF HPV INFECTION: Primary | ICD-10-CM

## 2018-02-15 DIAGNOSIS — R87.610 PAP SMEAR ABNORMALITY OF CERVIX WITH ASCUS FAVORING DYSPLASIA: ICD-10-CM

## 2018-02-15 PROCEDURE — 88305 TISSUE EXAM BY PATHOLOGIST: CPT | Performed by: OBSTETRICS & GYNECOLOGY

## 2018-02-15 PROCEDURE — 57454 BX/CURETT OF CERVIX W/SCOPE: CPT | Performed by: OBSTETRICS & GYNECOLOGY

## 2018-02-15 PROCEDURE — 88305 TISSUE EXAM BY PATHOLOGIST: CPT | Performed by: PATHOLOGY

## 2018-02-15 RX ORDER — MULTIVITAMIN
CAPSULE ORAL
COMMUNITY

## 2018-02-15 NOTE — PROGRESS NOTES
Colposcopy  Date/Time: 2/15/2018 5:25 PM  Performed by: Pastor Johnson  Authorized by: Pastor Johnson     Consent:     Consent obtained:  Verbal and written    Consent given by:  Patient    Patient questions answered: yes      Patient agrees, verbalizes understanding, and wants to proceed: yes    Pre-procedure:     Prepped with: acetic acid    Indication:     Indications: normal pap, + HPV  Procedure:     Procedure: Colposcopy w/ cervical biopsy and ECC      [place] speculum was placed in the vagina: yes      Under colposcopic examination the transition zone was seen in entirety: yes      Intracervical block was performed: no      Endocervix was curetted using a Kevorkian curette: yes      Cervical biopsy performed with a cervical biopsy punch: yes      Tampon inserted: no      Monsel's solution was applied: yes      Biopsy(s): yes      Location:  12 o'clock    Specimen to pathology: yes    Post-procedure:     Patient tolerance of procedure:   Tolerated well, no immediate complications  Comments:      Pt had ASCUS, + HPV last year and then pap was repeated and was normal with + HPV  H/o LEEP several years ago  Await colposcopy results    Of note, IUD string visible on exam

## 2018-03-28 LAB
BASOPHILS # BLD AUTO: 109 CELLS/UL (ref 0–200)
BASOPHILS NFR BLD AUTO: 1.6 %
EOSINOPHIL # BLD AUTO: 190 CELLS/UL (ref 15–500)
EOSINOPHIL NFR BLD AUTO: 2.8 %
ERYTHROCYTE [DISTWIDTH] IN BLOOD BY AUTOMATED COUNT: 14.2 % (ref 11–15)
HCT VFR BLD AUTO: 42.6 % (ref 35–45)
HGB BLD-MCNC: 13.8 G/DL (ref 11.7–15.5)
LYMPHOCYTES # BLD AUTO: 1462 CELLS/UL (ref 850–3900)
LYMPHOCYTES NFR BLD AUTO: 21.5 %
MCH RBC QN AUTO: 28.5 PG (ref 27–33)
MCHC RBC AUTO-ENTMCNC: 32.4 G/DL (ref 32–36)
MCV RBC AUTO: 88 FL (ref 80–100)
MONOCYTES # BLD AUTO: 469 CELLS/UL (ref 200–950)
MONOCYTES NFR BLD AUTO: 6.9 %
NEUTROPHILS # BLD AUTO: 4570 CELLS/UL (ref 1500–7800)
NEUTROPHILS NFR BLD AUTO: 67.2 %
PLATELET # BLD AUTO: 449 THOUSAND/UL (ref 140–400)
PMV BLD REES-ECKER: 9.6 FL (ref 7.5–12.5)
RBC # BLD AUTO: 4.84 MILLION/UL (ref 3.8–5.1)
WBC # BLD AUTO: 6.8 THOUSAND/UL (ref 3.8–10.8)

## 2018-03-30 ENCOUNTER — TELEPHONE (OUTPATIENT)
Dept: INTERNAL MEDICINE CLINIC | Facility: CLINIC | Age: 48
End: 2018-03-30

## 2018-03-30 DIAGNOSIS — R79.89 ELEVATED PLATELET COUNT: Primary | ICD-10-CM

## 2018-04-17 ENCOUNTER — TELEPHONE (OUTPATIENT)
Dept: INTERNAL MEDICINE CLINIC | Facility: CLINIC | Age: 48
End: 2018-04-17

## 2018-04-17 DIAGNOSIS — Z12.11 SCREEN FOR COLON CANCER: Primary | ICD-10-CM

## 2018-04-17 NOTE — TELEPHONE ENCOUNTER
Aaron Sr has an appt on 4/23 for a consult and they are asking to have an order put in her chart   Thanks

## 2018-04-19 NOTE — TELEPHONE ENCOUNTER
Elena Ni was asking us to put a referral in system for gi   If this is ok I can put in just give me a dx

## 2018-04-23 ENCOUNTER — OFFICE VISIT (OUTPATIENT)
Dept: GASTROENTEROLOGY | Facility: MEDICAL CENTER | Age: 48
End: 2018-04-23
Payer: COMMERCIAL

## 2018-04-23 VITALS
HEART RATE: 78 BPM | BODY MASS INDEX: 19.49 KG/M2 | SYSTOLIC BLOOD PRESSURE: 108 MMHG | WEIGHT: 110 LBS | DIASTOLIC BLOOD PRESSURE: 72 MMHG | TEMPERATURE: 97.2 F | HEIGHT: 63 IN

## 2018-04-23 DIAGNOSIS — Z12.11 COLON CANCER SCREENING: Primary | ICD-10-CM

## 2018-04-23 DIAGNOSIS — G89.29 RECTAL PAIN, CHRONIC: ICD-10-CM

## 2018-04-23 DIAGNOSIS — K62.89 RECTAL PAIN, CHRONIC: ICD-10-CM

## 2018-04-23 PROCEDURE — 99242 OFF/OP CONSLTJ NEW/EST SF 20: CPT | Performed by: INTERNAL MEDICINE

## 2018-04-23 RX ORDER — ASPIRIN 81 MG/1
81 TABLET ORAL DAILY
COMMUNITY

## 2018-04-23 NOTE — PROGRESS NOTES
[facility] - Outpatient Consultation  Jose Luis Berg 57 y o  female MRN: [id number]  Encounter: [id number]          ASSESSMENT AND PLAN:      1  Colon cancer screening  -   Patient does have significant family history of cancers but only 1 person with colorectal cancer  It did not meet the [place] criteria for Aviles syndrome  ( 3 affected members with CRC or cancer of endometrium or smallbowel, transitional cell ca of the ureter or renal pelvis, 2 generations, 1 under age 48)  -  Patient was counseled on Myriad testing (DNA testing) for malignancy  She was counseled on the benefits and risks  She was given the information and will discuss with insurance company before getting tested  2  Intermittent rectal pain:  - Not associated with BM or bleeding  Normal rectal exam   Likely to be functional      ______________________________________________________________________    HPI:   80-year-old female presented for evaluation of colon cancer screening  Patient was referred to us due to her significant family history and her personal history of breast cancer  Patient also reported intermittent rectal pain which is not associated with bowel movement in the past 2 years  She reported regular formed bowel movement with no bleeding  She denies abdominal pain  Her paternal Gram was diagnosed with ovarian cancer in old age  Her paternal aunt  of metastatic cancer of unknown origin in her 62s  Her paternal cousin from and healthy and was diagnosed with colon cancer in her 46s recently  She herself was diagnosed with breast cancer 8 years ago and she is currently in remission  She has no other GI complaints  REVIEW OF SYSTEMS:    CONSTITUTIONAL: Denies any fever, chills, rigors, and weight loss  HEENT: No earache or tinnitus  Denies hearing loss or visual disturbances  CARDIOVASCULAR: No chest pain or palpitations     RESPIRATORY: Denies any cough, hemoptysis, shortness of breath or dyspnea on exertion  GASTROINTESTINAL: As noted in the History of Present Illness  GENITOURINARY: No problems with urination  Denies any hematuria or dysuria  NEUROLOGIC: No dizziness or vertigo, denies headaches  MUSCULOSKELETAL: Denies any muscle or joint pain  SKIN: Denies skin rashes or itching  ENDOCRINE: Denies excessive thirst  Denies intolerance to heat or cold  PSYCHOSOCIAL: Denies depression or anxiety  Denies any recent memory loss         Historical Information   Past Medical History:   Diagnosis Date    Breast cancer ([facility] )     History of infection due to human papilloma virus (HPV)     last assessed - [id number]    History of radiation therapy     last assessed - 01NRJ5517    HPV (human papilloma virus) infection     BALDEMAR-2 gene mutation     Moderate dysplasia of cervix (EMILI II)     last assessed - 91Faq9510    Ovarian cyst     last assessed - 24STG7717    Pap smear abnormality of cervix with ASCUS favoring dysplasia     last assessed - 96Jxb4460    Thrombocytosis ([facility] )     Use of tamoxifen (Nolvadex)     last assessed - 02FOO6188     Past Surgical History:   Procedure Laterality Date    BREAST LUMPECTOMY Right     last assessed - 35NNQ4873    BREAST RECONSTRUCTION Bilateral     bilateral implants    CERVICAL BIOPSY  W/ LOOP ELECTRODE EXCISION      COLPOSCOPY W/ BIOPSY / CURETTAGE      Colposcopy Cervix with Biopsy(s) with Endocervical Currettage    MASTECTOMY, PARTIAL Right 05/11/2009    right partial mastectomy re-excision 6/26/09    SENTINEL LYMPH NODE BIOPSY Right      Social History   History   Alcohol Use    Yes     Comment: Social drinker     History   Drug Use No     History   Smoking Status    Former Smoker   Smokeless Tobacco    Never Used     Family History   Problem Relation Age of Onset    No Known Problems Mother     No Known Problems Father     Ovarian cancer Paternal Grandmother      age unknown    Colon polyps Cousin        Meds/Allergies Current Outpatient Prescriptions:     aspirin (ECOTRIN LOW STRENGTH) 81 mg EC tablet    Multiple Vitamin (MULTIVITAMIN) capsule    Allergies   Allergen Reactions    Cephalexin Rash           Objective     Blood pressure 108/72, pulse 78, temperature (!) 97 2 °F (36 2 °C), temperature source Tympanic, height 5' 3" (1 6 m), weight 49 9 kg (110 lb)  Body mass index is 19 49 kg/m²  PHYSICAL EXAM:      General Appearance:   Alert, cooperative, no distress   HEENT:   Normocephalic, atraumatic, anicteric      Neck:  Supple, symmetrical, trachea midline   Lungs:   Clear to auscultation bilaterally; no rales, rhonchi or wheezing; respirations unlabored    Heart[de-identified]   Regular rate and rhythm; no murmur, rub, or gallop  Abdomen:   Soft, non-tender, non-distended; normal bowel sounds; no masses, no organomegaly    Genitalia:   Deferred    Rectal:     No external hemorrhoids seen  Rectal exam was normal   Normal rectal tone  Extremities:  No cyanosis, clubbing or edema    Pulses:  2+ and symmetric    Skin:  No jaundice, rashes, or lesions    Lymph nodes:  No palpable cervical lymphadenopathy        Lab Results:   No visits with results within 1 Day(s) from this visit     Latest known visit with results is:   Orders Only on 03/27/2018   Component Date Value     AMB LAB WHITE BLOOD C* 03/27/2018 6 8      AMB LAB RED BLOOD VICENTE* 03/27/2018 4 84     Hemoglobin 03/27/2018 13 8     Hematocrit 03/27/2018 42 6     MCV 03/27/2018 88 0     MCH 03/27/2018 28 5     MCHC 03/27/2018 32 4     RDW 03/27/2018 14 2     Platelet Count 93/44/3125 449*    SL AMB MPV 03/27/2018 9 6     Neutrophils (Absolute) 03/27/2018 4570     Lymphocytes (Absolute) 03/27/2018 1462     Monocytes (Absolute) 03/27/2018 469     Eosinophils (Absolute) 03/27/2018 190     Basophils (Absolute) 03/27/2018 109     Neutrophils 03/27/2018 67 2     Lymphocytes 03/27/2018 21 5     Monocytes 03/27/2018 6 9     Eosinophils 03/27/2018 2 8  Basophils 03/27/2018 1 6          Radiology Results:   No results found

## 2018-05-07 ENCOUNTER — HOSPITAL ENCOUNTER (OUTPATIENT)
Dept: MAMMOGRAPHY | Facility: MEDICAL CENTER | Age: 48
Discharge: HOME | End: 2018-05-07
Payer: COMMERCIAL

## 2018-05-07 DIAGNOSIS — Z12.39 ENCOUNTER FOR OTHER SCREENING FOR MALIGNANT NEOPLASM OF BREAST: ICD-10-CM

## 2018-05-07 PROCEDURE — 77063 BREAST TOMOSYNTHESIS BI: CPT

## 2018-05-07 PROCEDURE — 77067 SCR MAMMO BI INCL CAD: CPT

## 2018-05-17 ENCOUNTER — TELEPHONE (OUTPATIENT)
Dept: GASTROENTEROLOGY | Facility: AMBULARY SURGERY CENTER | Age: 48
End: 2018-05-17

## 2018-05-21 ENCOUNTER — OFFICE VISIT (OUTPATIENT)
Dept: SURGICAL ONCOLOGY | Facility: CLINIC | Age: 48
End: 2018-05-21
Payer: COMMERCIAL

## 2018-05-21 VITALS
BODY MASS INDEX: 19.67 KG/M2 | TEMPERATURE: 98.5 F | SYSTOLIC BLOOD PRESSURE: 100 MMHG | DIASTOLIC BLOOD PRESSURE: 70 MMHG | HEIGHT: 63 IN | RESPIRATION RATE: 18 BRPM | HEART RATE: 70 BPM | WEIGHT: 111 LBS

## 2018-05-21 DIAGNOSIS — Z79.810 USE OF TAMOXIFEN (NOLVADEX): ICD-10-CM

## 2018-05-21 DIAGNOSIS — C50.911 INVASIVE DUCTAL CARCINOMA OF BREAST, FEMALE, RIGHT (HCC): Primary | ICD-10-CM

## 2018-05-21 DIAGNOSIS — Z12.39 BREAST CANCER SCREENING, HIGH RISK PATIENT: ICD-10-CM

## 2018-05-21 DIAGNOSIS — Z92.3 HX OF RADIATION THERAPY: ICD-10-CM

## 2018-05-21 PROCEDURE — 99214 OFFICE O/P EST MOD 30 MIN: CPT | Performed by: SURGERY

## 2018-05-21 NOTE — PROGRESS NOTES
Surgical Oncology Follow Up       [facility]  [facility] [address]  [facility] [address]    Stone Second  1970  [id number]  [address]  [facility] [place]  [address] [place] [id number]    Chief Complaint   Patient presents with    Breast Cancer     Pt is here for 1 year follow up        Assessment/Plan   Diagnoses and all orders for this visit:    Invasive ductal carcinoma of breast, female, right ([facility] )    Hx of radiation therapy    Use of tamoxifen (Nolvadex)    Breast cancer screening, high risk patient  -     Mammo screening bilateral w 3d & cad; Future        Advance Care Planning/Advance Directives:  Did not discuss  with the patient  Oncology History:       Invasive ductal carcinoma of breast, female, right [facility])     Initial Diagnosis     Invasive ductal carcinoma of breast, female, right ([facility] )       5/11/2009 Surgery     Right partial mastectomy, SLNB, breast implant  Dr Tova Henry         2009 -  Radiation     WBRT  Dr Patt Howell     Tamoxifen  Dr Sonia Patel     BRCA negative            History of Present Illness: breast cancer follow up  -Interval History:none    Review of Systems:  Review of Systems   Constitutional: Negative  Negative for appetite change and fever  Eyes: Negative  Respiratory: Negative for shortness of breath  Cardiovascular: Negative  Gastrointestinal: Negative  Endocrine: Negative  Genitourinary: Negative  Musculoskeletal: Negative  Negative for arthralgias and myalgias  Skin: Negative  Allergic/Immunologic: Negative  Neurological: Negative  Hematological: Negative  Negative for adenopathy  Does not bruise/bleed easily  Psychiatric/Behavioral: Negative          Patient Active Problem List   Diagnosis    Invasive ductal carcinoma of breast, female, right ([facility] )    Breast cancer screening, high risk patient Past Medical History:   Diagnosis Date    Anxiety     Bacterial vaginosis     History of infection due to human papilloma virus (HPV)     last assessed - [id number]    HPV (human papilloma virus) infection     Hx of radiation therapy     BALDEMAR-2 gene mutation     Moderate dysplasia of cervix (EMILI II)     last assessed - 42Ite1535    Ovarian cyst     last assessed - 02TMY1966    Pap smear abnormality of cervix with ASCUS favoring dysplasia     last assessed - 82Ktg0525    Secondary amenorrhea     Thrombocytosis ([address] )     Use of tamoxifen (Nolvadex)     last assessed - 40GUL0170     Past Surgical History:   Procedure Laterality Date    BREAST LUMPECTOMY Right     last assessed - 64TXQ5054    BREAST RECONSTRUCTION Bilateral     bilateral implants    CERVICAL BIOPSY  W/ LOOP ELECTRODE EXCISION      COLPOSCOPY W/ BIOPSY / CURETTAGE      Colposcopy Cervix with Biopsy(s) with Endocervical Currettage    MASTECTOMY, PARTIAL Right 05/11/2009    right partial mastectomy re-excision 6/26/09    SENTINEL LYMPH NODE BIOPSY Right      Family History   Problem Relation Age of Onset    No Known Problems Mother     No Known Problems Father     Ovarian cancer Paternal Grandmother      age unknown    Colon polyps Cousin      Social History     Social History    Marital status: Single     Spouse name: N/A    Number of children: N/A    Years of education: N/A     Occupational History    Not on file       Social History Main Topics    Smoking status: Former Smoker    Smokeless tobacco: Never Used    Alcohol use Yes      Comment: Social drinker    Drug use: No    Sexual activity: Yes     Partners: Male     Other Topics Concern    Not on file     Social History Narrative    Caffeine use    Marital History -     Zoroastrianism Affiliation - Moravian    Uses safety equipment - Seat belts           Current Outpatient Prescriptions:     aspirin (ECOTRIN LOW STRENGTH) 81 mg EC tablet, Take 81 mg by mouth daily, Disp: , Rfl:     Multiple Vitamin (MULTIVITAMIN) capsule, Take by mouth, Disp: , Rfl:   Allergies   Allergen Reactions    Cephalexin Rash       The following portions of the patient's history were reviewed and updated as appropriate: allergies, current medications, past family history, past medical history, past social history, past surgical history and problem list         Vitals:    05/21/18 1602   BP: 100/70   Pulse: 70   Resp: 18   Temp: 98 5 °F (36 9 °C)       Physical Exam   Constitutional: She is oriented to person, place, and time  She appears well-developed and well-nourished  HENT:   Head: Normocephalic and atraumatic  Cardiovascular: Normal heart sounds  Pulmonary/Chest: Breath sounds normal  Right breast exhibits skin change (lumpectomy scar)  Right breast exhibits no inverted nipple, no mass, no nipple discharge and no tenderness  Left breast exhibits no inverted nipple, no mass, no nipple discharge, no skin change and no tenderness  Breasts are asymmetrical (L>R)  Bilateral implants   Abdominal: Soft  Lymphadenopathy:        Right axillary: No pectoral and no lateral adenopathy present  Left axillary: No pectoral and no lateral adenopathy present  Right: No supraclavicular adenopathy present  Left: No supraclavicular adenopathy present  Neurological: She is alert and oriented to person, place, and time  Psychiatric: She has a normal mood and affect  Results:      Imaging  05/07/2018 bilateral 3D screening mammogram is benign BI-RADS one density four    I reviewed the above imaging data  Discussion/Summary:  51-year-old female status post right breast conservation for a stage I carcinoma  There is no evidence of disease based on examination today or recent mammogram   She is now nine years out of her diagnosis  I will see her again in one year or sooner should the need arise

## 2018-06-22 ENCOUNTER — TELEPHONE (OUTPATIENT)
Dept: INTERNAL MEDICINE CLINIC | Facility: CLINIC | Age: 48
End: 2018-06-22

## 2018-06-22 NOTE — TELEPHONE ENCOUNTER
----- Message from Funmilayo Kingston MD sent at 6/22/2018  3:22 PM EDT -----  Platelet count is higher than before    I would like to see a hematologist   Pl refer

## 2018-06-28 ENCOUNTER — OFFICE VISIT (OUTPATIENT)
Dept: HEMATOLOGY ONCOLOGY | Facility: CLINIC | Age: 48
End: 2018-06-28
Payer: COMMERCIAL

## 2018-06-28 VITALS
TEMPERATURE: 97.6 F | HEIGHT: 63 IN | BODY MASS INDEX: 19.74 KG/M2 | OXYGEN SATURATION: 97 % | HEART RATE: 75 BPM | DIASTOLIC BLOOD PRESSURE: 68 MMHG | RESPIRATION RATE: 16 BRPM | WEIGHT: 111.4 LBS | SYSTOLIC BLOOD PRESSURE: 110 MMHG

## 2018-06-28 DIAGNOSIS — D75.839 THROMBOCYTOSIS: ICD-10-CM

## 2018-06-28 DIAGNOSIS — C50.911 INVASIVE DUCTAL CARCINOMA OF BREAST, FEMALE, RIGHT (HCC): Primary | ICD-10-CM

## 2018-06-28 PROCEDURE — 99215 OFFICE O/P EST HI 40 MIN: CPT | Performed by: INTERNAL MEDICINE

## 2018-06-28 NOTE — PROGRESS NOTES
[id number] HEMATOLOGY ONCOLOGY [place]  [address] [facility] [address] [place] [address] [phone]    Caro Joshi,1970, [id number]  06/28/18    Discussion:   In summary, this is a 56-year-old female history of early stage breast cancer  Additionally, she had fluctuating thrombocytosis over several years previously  JAK2 mutation and [place] chromosome were both negative in the past   With serial surveillance thrombocytopenia centrally resolved  In the past year or so she has had occasional episodes of thrombocytosis  With normal white blood cell count, differential, hemoglobin  We reviewed that this could be secondary or primary in nature  Review of systems is generally normal  She does report that she has a tight feeling around the inferolateral aspect of the right breast near her previous radiation field/implant  She is seeking a 2nd plastic surgical opinion regarding MG abnormality that may be occurring there  This may very well be the route of intermittent thrombocytosis in a reactive nature  She is up-to-date with age-appropriate cancer screening  I encouraged her proceed with surgical opinion and will see her back in 2 months for review with repeat blood work just prior to that visit  If the issue is not clarified at that time further investigation would follow accordingly  I discussed the above with the patient  The patient  voiced understanding and agreement   ______________________________________________________________________    Chief Complaint   Patient presents with    Follow-up     follow up for rising platelets       HPI:     Invasive ductal carcinoma of breast, female, right ([address] )     Initial Diagnosis     Invasive ductal carcinoma of breast, female, right ([facility] )       5/11/2009 Surgery     Right partial mastectomy, SLNB, breast implant  Dr Linda Nj         2009 -  Radiation     WBRT  Dr Abdiel Woo     Tamoxifen  Dr Frankie Larson     BRCA negative            Interval History:  Clinically stable  1 - Symptomatic but completely ambulatory    Review of Systems    Past Medical History:   Diagnosis Date    Anxiety     Bacterial vaginosis     Breast cancer ([facility] )     History of infection due to human papilloma virus (HPV)     last assessed - [id number]    HPV (human papilloma virus) infection     Hx of radiation therapy     BALDEMAR-2 gene mutation     Moderate dysplasia of cervix (EMILI II)     last assessed - 33Yxu9563    Ovarian cyst     last assessed - 04DMC0922    Pap smear abnormality of cervix with ASCUS favoring dysplasia     last assessed - 71Ior5182    Secondary amenorrhea     Thrombocytosis ([facility] )     Use of tamoxifen (Nolvadex)     last assessed - 94VCI6858     Patient Active Problem List   Diagnosis    Invasive ductal carcinoma of breast, female, right ([facility] )    Breast cancer screening, high risk patient       Current Outpatient Prescriptions:     aspirin (ECOTRIN LOW STRENGTH) 81 mg EC tablet, Take 81 mg by mouth daily, Disp: , Rfl:     Multiple Vitamin (MULTIVITAMIN) capsule, Take by mouth, Disp: , Rfl:   Allergies   Allergen Reactions    Cephalexin Rash     Past Surgical History:   Procedure Laterality Date    BREAST LUMPECTOMY Right     last assessed - 20CQI7091    BREAST RECONSTRUCTION Bilateral     bilateral implants    CERVICAL BIOPSY  W/ LOOP ELECTRODE EXCISION      COLPOSCOPY W/ BIOPSY / CURETTAGE      Colposcopy Cervix with Biopsy(s) with Endocervical Currettage    MASTECTOMY, PARTIAL Right 05/11/2009    right partial mastectomy re-excision 6/26/09    SENTINEL LYMPH NODE BIOPSY Right      Social History     Objective:  Vitals:    06/28/18 1500   BP: 110/68   BP Location: Left arm   Cuff Size: Adult   Pulse: 75   Resp: 16   Temp: 97 6 °F (36 4 °C)   TempSrc: Tympanic   SpO2: 97%   Weight: 50 5 kg (111 lb 6 4 oz)   Height: 5' 3" (1 6 m)     Physical Exam      Labs:   I personally reviewed the labs and imaging pertinent to this patient care

## 2018-08-24 DIAGNOSIS — C50.911 MALIGNANT NEOPLASM OF RIGHT FEMALE BREAST, UNSPECIFIED ESTROGEN RECEPTOR STATUS, UNSPECIFIED SITE OF BREAST (HCC): Primary | ICD-10-CM

## 2018-09-05 ENCOUNTER — TELEPHONE (OUTPATIENT)
Dept: HEMATOLOGY ONCOLOGY | Facility: CLINIC | Age: 48
End: 2018-09-05

## 2018-09-05 NOTE — TELEPHONE ENCOUNTER
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

## 2018-09-13 ENCOUNTER — OFFICE VISIT (OUTPATIENT)
Dept: HEMATOLOGY ONCOLOGY | Facility: CLINIC | Age: 48
End: 2018-09-13
Payer: COMMERCIAL

## 2018-09-13 VITALS
OXYGEN SATURATION: 97 % | DIASTOLIC BLOOD PRESSURE: 68 MMHG | WEIGHT: 114.6 LBS | HEART RATE: 74 BPM | TEMPERATURE: 98 F | BODY MASS INDEX: 20.3 KG/M2 | HEIGHT: 63 IN | RESPIRATION RATE: 17 BRPM | SYSTOLIC BLOOD PRESSURE: 116 MMHG

## 2018-09-13 DIAGNOSIS — D47.3 ESSENTIAL THROMBOCYTOSIS (HCC): Primary | ICD-10-CM

## 2018-09-13 PROBLEM — D75.839 THROMBOCYTOSIS: Status: RESOLVED | Noted: 2018-06-28 | Resolved: 2018-09-13

## 2018-09-13 PROCEDURE — 99215 OFFICE O/P EST HI 40 MIN: CPT | Performed by: INTERNAL MEDICINE

## 2018-09-13 RX ORDER — COPPER 313.4 MG/1
1 INTRAUTERINE DEVICE INTRAUTERINE ONCE
COMMUNITY

## 2018-09-13 NOTE — LETTER
September 13, 2018     Lindsey Schroeder MD  [address]     Patient: Elton Current   YOB: 1970   Date of Visit: 9/13/2018       Dear Dr Gerhardt Dade: Thank you for referring Elton Current to me for evaluation  Below are my notes for this consultation  If you have questions, please do not hesitate to call me  I look forward to following your patient along with you  Sincerely,        Joseline Brooke DO        CC: MD Cristina Jordan MD Annabell Pol, DO  9/13/2018  3:57 PM  Incomplete  [address] [facility]  [address] [address]  [phone]  [phone]    Spencer Cortes Madelyn,1970, [id number]  09/13/18    Discussion:   ***    I discussed the above with the patient  The patient *** voiced understanding and agreement   ______________________________________________________________________    Chief Complaint   Patient presents with    Follow-up       HPI:     Invasive ductal carcinoma of breast, female, right [facility])     Initial Diagnosis     Invasive ductal carcinoma of breast, female, right ([facility] [address] [facility] )       5/11/2009 Surgery     Right partial mastectomy, SLNB, breast implant  Dr Back People         2009 -  Radiation     WBRT  Dr Emilia Romero     Tamoxifen  Dr Alaina Sheikh     BRCA negative            Interval History:  ***  {performance status:04357}    Review of Systems   Constitutional: Negative for appetite change, diaphoresis, fatigue and fever  HENT: Negative for sinus pain  Eyes: Negative for discharge  Respiratory: Negative for cough and shortness of breath  Cardiovascular: Negative for chest pain  Gastrointestinal: Negative for abdominal pain, constipation and diarrhea  Endocrine: Negative for cold intolerance  Genitourinary: Negative for difficulty urinating and hematuria  Musculoskeletal: Negative for joint swelling     Skin: Negative for rash    Allergic/Immunologic: Negative for environmental allergies  Neurological: Negative for dizziness and headaches  Hematological: Negative for adenopathy  Psychiatric/Behavioral: Negative for agitation         Past Medical History:   Diagnosis Date    Anxiety     Bacterial vaginosis     Breast cancer ([place] [address] )     History of infection due to human papilloma virus (HPV)     last assessed - 60IYF3582    HPV (human papilloma virus) infection     Hx of radiation therapy     BALDEMAR-2 gene mutation     Moderate dysplasia of cervix (EMILI II)     last assessed - 67Klq8471    Ovarian cyst     last assessed - 20LYT8257    Pap smear abnormality of cervix with ASCUS favoring dysplasia     last assessed - 74Pta0722    Secondary amenorrhea     Thrombocytosis ([place] [address] )     Use of tamoxifen (Nolvadex)     last assessed - 34BMB8088     Patient Active Problem List   Diagnosis    Invasive ductal carcinoma of breast, female, right ([place] [address] )    Breast cancer screening, high risk patient    Thrombocytosis ([place] [address] )       Current Outpatient Prescriptions:     aspirin (ECOTRIN LOW STRENGTH) 81 mg EC tablet, Take 81 mg by mouth daily, Disp: , Rfl:     intrauterine copper (PARAGARD) IUD, 1 each by Intrauterine route once, Disp: , Rfl:     Multiple Vitamin (MULTIVITAMIN) capsule, Take by mouth, Disp: , Rfl:   Allergies   Allergen Reactions    Cephalexin Rash     Past Surgical History:   Procedure Laterality Date    BREAST LUMPECTOMY Right     last assessed - 21HKT4505    BREAST RECONSTRUCTION Bilateral     bilateral implants    CERVICAL BIOPSY  W/ LOOP ELECTRODE EXCISION      COLPOSCOPY W/ BIOPSY / CURETTAGE      Colposcopy Cervix with Biopsy(s) with Endocervical Currettage    MASTECTOMY, PARTIAL Right 05/11/2009    right partial mastectomy re-excision 6/26/09    SENTINEL LYMPH NODE BIOPSY Right      Social History     Objective:  Vitals:    09/13/18 1522   BP: 116/68   BP Location: Left arm   Patient Position: Sitting Pulse: 74   Resp: 17   Temp: 98 °F (36 7 °C)   TempSrc: Tympanic   SpO2: 97%   Weight: 52 kg (114 lb 9 6 oz)   Height: 5' 3" (1 6 m)     Physical Exam   Constitutional: She is oriented to person, place, and time  She appears well-developed  HENT:   Head: Normocephalic  Eyes: Pupils are equal, round, and reactive to light  Neck: Neck supple  Cardiovascular: Normal rate  No murmur heard  Pulmonary/Chest: No respiratory distress  She has no wheezes  She has no rales  Abdominal: Soft  She exhibits no distension  There is no tenderness  There is no rebound  Musculoskeletal: She exhibits no edema  Lymphadenopathy:     She has no cervical adenopathy  Neurological: She is alert and oriented to person, place, and time  She displays normal reflexes  Skin: Skin is warm  No rash noted  Psychiatric: She has a normal mood and affect  Thought content normal          Labs: I personally reviewed the labs and imaging pertinent to this patient care

## 2018-09-13 NOTE — LETTER
September 13, 2018     Dunia Pennington MD  [address] [place] [address]    Patient: Maxime Yeager   YOB: 1970   Date of Visit: 9/13/2018       Dear Dr Steven Daniels: Thank you for referring Maxime Yeager to me for evaluation  Below are my notes for this consultation  If you have questions, please do not hesitate to call me  I look forward to following your patient along with you  Sincerely,        Flores Rob,         CC: MD Vini Washington MD Aline Baxter,   9/13/2018  4:09 PM  Sign at close encounter  [address]  [address] [address]  [phone]  [phone]    Shante Joshi,1970, [id number]  09/13/18    Discussion:   In summary, this is a 29-year-old female history of thrombocytosis  JAK2 mutation was positive  [place] chromosome negative by FISH  We reviewed that these findings are consistent with a diagnosis of essential thrombocytosis  Based on JAK2 positivity, absent thrombotic history, and age less than 61, she falls in the low risk category  Recommendation is observation, aspirin 81 mg p o  daily for thromboprophylaxis  We reviewed the nature of essential thrombocytosis  In the low risk category likelihood of thrombosis is essentially 1 % per patient/year  She is considering breast breast revision surgery  Aspirin could be held for 1 week prior to surgery, if felt applicable by the operate or  I discussed the above with the patient  The patient  voiced understanding and agreement   ______________________________________________________________________    Chief Complaint   Patient presents with    Follow-up       HPI:     Invasive ductal carcinoma of breast, female, right [facility])     Initial Diagnosis     Invasive ductal carcinoma of breast, female, right ([address] )       5/11/2009 Surgery     Right partial mastectomy, SLNB, breast implant    Dr Mark Zelaya 2009 -  Radiation     WBRT  Dr Hogan Milling     Tamoxifen  Dr Macie Leo     BRCA negative            Interval History:  Clinically stable  0 - Asymptomatic    Review of Systems   Constitutional: Negative for appetite change, diaphoresis, fatigue and fever  HENT: Negative for sinus pain  Eyes: Negative for discharge  Respiratory: Negative for cough and shortness of breath  Cardiovascular: Negative for chest pain  Gastrointestinal: Negative for abdominal pain, constipation and diarrhea  Endocrine: Negative for cold intolerance  Genitourinary: Negative for difficulty urinating and hematuria  Musculoskeletal: Negative for joint swelling  Skin: Negative for rash  Allergic/Immunologic: Negative for environmental allergies  Neurological: Negative for dizziness and headaches  Hematological: Negative for adenopathy  Psychiatric/Behavioral: Negative for agitation         Past Medical History:   Diagnosis Date    Anxiety     Bacterial vaginosis     Breast cancer ([facility] )     History of infection due to human papilloma virus (HPV)     last assessed - [id number]    HPV (human papilloma virus) infection     Hx of radiation therapy     BALDEMAR-2 gene mutation     Moderate dysplasia of cervix (EMILI II)     last assessed - [id number]    Ovarian cyst     last assessed - [id number]    Pap smear abnormality of cervix with ASCUS favoring dysplasia     last assessed - [id number]    Secondary amenorrhea     Thrombocytosis ([facility] )     Use of tamoxifen (Nolvadex)     last assessed - [id number]     Patient Active Problem List   Diagnosis    Invasive ductal carcinoma of breast, female, right ([facility] )    Breast cancer screening, high risk patient    Thrombocytosis ([facility] )       Current Outpatient Prescriptions:     aspirin (ECOTRIN LOW STRENGTH) 81 mg EC tablet, Take 81 mg by mouth daily, Disp: , Rfl:     intrauterine copper (PARAGARD) IUD, 1 each by Intrauterine route once, Disp: , Rfl:     Multiple Vitamin (MULTIVITAMIN) capsule, Take by mouth, Disp: , Rfl:   Allergies   Allergen Reactions    Cephalexin Rash     Past Surgical History:   Procedure Laterality Date    BREAST LUMPECTOMY Right     last assessed - [id number]    BREAST RECONSTRUCTION Bilateral     bilateral implants    CERVICAL BIOPSY  W/ LOOP ELECTRODE EXCISION      COLPOSCOPY W/ BIOPSY / CURETTAGE      Colposcopy Cervix with Biopsy(s) with Endocervical Currettage    MASTECTOMY, PARTIAL Right 05/11/2009    right partial mastectomy re-excision 6/26/09    SENTINEL LYMPH NODE BIOPSY Right      Social History     Objective:  Vitals:    09/13/18 1522   BP: 116/68   BP Location: Left arm   Patient Position: Sitting   Pulse: 74   Resp: 17   Temp: 98 °F (36 7 °C)   TempSrc: Tympanic   SpO2: 97%   Weight: 52 kg (114 lb 9 6 oz)   Height: 5' 3" (1 6 m)     Physical Exam   Constitutional: She is oriented to person, place, and time  She appears well-developed  HENT:   Head: Normocephalic  Eyes: Pupils are equal, round, and reactive to light  Neck: Neck supple  Cardiovascular: Normal rate  No murmur heard  Pulmonary/Chest: No respiratory distress  She has no wheezes  She has no rales  Abdominal: Soft  She exhibits no distension  There is no tenderness  There is no rebound  Musculoskeletal: She exhibits no edema  Lymphadenopathy:     She has no cervical adenopathy  Neurological: She is alert and oriented to person, place, and time  She displays normal reflexes  Skin: Skin is warm  No rash noted  Psychiatric: She has a normal mood and affect  Thought content normal          Labs: I personally reviewed the labs and imaging pertinent to this patient care

## 2018-09-13 NOTE — PROGRESS NOTES
[facility] HEMATOLOGY ONCOLOGY Dary Valadez  [address]  [address] [address] [phone]    Vania Joshi,1970, [id number]  09/13/18    Discussion:   In summary, this is a 53-year-old female history of thrombocytosis  JAK2 mutation was positive  [place] chromosome negative by FISH  We reviewed that these findings are consistent with a diagnosis of essential thrombocytosis  Based on JAK2 positivity, absent thrombotic history, and age less than 61, she falls in the low risk category  Recommendation is observation, aspirin 81 mg p o  daily for thromboprophylaxis  We reviewed the nature of essential thrombocytosis  In the low risk category likelihood of thrombosis is essentially 1 % per patient/year  She is considering breast breast revision surgery  Aspirin could be held for 1 week prior to surgery, if felt applicable by the operate or  I discussed the above with the patient  The patient  voiced understanding and agreement   ______________________________________________________________________    Chief Complaint   Patient presents with    Follow-up       HPI:     Invasive ductal carcinoma of breast, female, right [facility])     Initial Diagnosis     Invasive ductal carcinoma of breast, female, right ([facility] )       5/11/2009 Surgery     Right partial mastectomy, SLNB, breast implant  Dr Mavis Ballesteros         2009 -  Radiation     WBRT  Dr Gloriann Krabbe     Tamoxifen  Dr Destiny Hanson     BRCA negative            Interval History:  Clinically stable  0 - Asymptomatic    Review of Systems   Constitutional: Negative for appetite change, diaphoresis, fatigue and fever  HENT: Negative for sinus pain  Eyes: Negative for discharge  Respiratory: Negative for cough and shortness of breath  Cardiovascular: Negative for chest pain  Gastrointestinal: Negative for abdominal pain, constipation and diarrhea     Endocrine: Negative for cold intolerance  Genitourinary: Negative for difficulty urinating and hematuria  Musculoskeletal: Negative for joint swelling  Skin: Negative for rash  Allergic/Immunologic: Negative for environmental allergies  Neurological: Negative for dizziness and headaches  Hematological: Negative for adenopathy  Psychiatric/Behavioral: Negative for agitation         Past Medical History:   Diagnosis Date    Anxiety     Bacterial vaginosis     Breast cancer ([facility] )     History of infection due to human papilloma virus (HPV)     last assessed - [id number]    HPV (human papilloma virus) infection     Hx of radiation therapy     BALDEMAR-2 gene mutation     Moderate dysplasia of cervix (EMILI II)     last assessed - 66Ppq9569    Ovarian cyst     last assessed - 29RNB6853    Pap smear abnormality of cervix with ASCUS favoring dysplasia     last assessed - 84Eje0870    Secondary amenorrhea     Thrombocytosis ([facility] )     Use of tamoxifen (Nolvadex)     last assessed - 58BBR1852     Patient Active Problem List   Diagnosis    Invasive ductal carcinoma of breast, female, right ([place] [address] )    Breast cancer screening, high risk patient    Thrombocytosis ([place] [address] )       Current Outpatient Prescriptions:     aspirin (ECOTRIN LOW STRENGTH) 81 mg EC tablet, Take 81 mg by mouth daily, Disp: , Rfl:     intrauterine copper (PARAGARD) IUD, 1 each by Intrauterine route once, Disp: , Rfl:     Multiple Vitamin (MULTIVITAMIN) capsule, Take by mouth, Disp: , Rfl:   Allergies   Allergen Reactions    Cephalexin Rash     Past Surgical History:   Procedure Laterality Date    BREAST LUMPECTOMY Right     last assessed - [id number]    BREAST RECONSTRUCTION Bilateral     bilateral implants    CERVICAL BIOPSY  W/ LOOP ELECTRODE EXCISION      COLPOSCOPY W/ BIOPSY / CURETTAGE      Colposcopy Cervix with Biopsy(s) with Endocervical Currettage    MASTECTOMY, PARTIAL Right 05/11/2009    right partial mastectomy re-excision 6/26/09    SENTINEL LYMPH NODE BIOPSY Right      Social History     Objective:  Vitals:    09/13/18 1522   BP: 116/68   BP Location: Left arm   Patient Position: Sitting   Pulse: 74   Resp: 17   Temp: 98 °F (36 7 °C)   TempSrc: Tympanic   SpO2: 97%   Weight: 52 kg (114 lb 9 6 oz)   Height: 5' 3" (1 6 m)     Physical Exam   Constitutional: She is oriented to person, place, and time  She appears well-developed  HENT:   Head: Normocephalic  Eyes: Pupils are equal, round, and reactive to light  Neck: Neck supple  Cardiovascular: Normal rate  No murmur heard  Pulmonary/Chest: No respiratory distress  She has no wheezes  She has no rales  Abdominal: Soft  She exhibits no distension  There is no tenderness  There is no rebound  Musculoskeletal: She exhibits no edema  Lymphadenopathy:     She has no cervical adenopathy  Neurological: She is alert and oriented to person, place, and time  She displays normal reflexes  Skin: Skin is warm  No rash noted  Psychiatric: She has a normal mood and affect  Thought content normal          Labs: I personally reviewed the labs and imaging pertinent to this patient care

## 2019-01-14 ENCOUNTER — OFFICE VISIT (OUTPATIENT)
Dept: HEMATOLOGY ONCOLOGY | Facility: CLINIC | Age: 49
End: 2019-01-14
Payer: COMMERCIAL

## 2019-01-14 VITALS
RESPIRATION RATE: 16 BRPM | TEMPERATURE: 97.1 F | HEIGHT: 63 IN | HEART RATE: 66 BPM | DIASTOLIC BLOOD PRESSURE: 76 MMHG | WEIGHT: 116.4 LBS | OXYGEN SATURATION: 98 % | BODY MASS INDEX: 20.62 KG/M2 | SYSTOLIC BLOOD PRESSURE: 118 MMHG

## 2019-01-14 DIAGNOSIS — D47.3 ESSENTIAL THROMBOCYTOSIS (HCC): ICD-10-CM

## 2019-01-14 DIAGNOSIS — C50.911 INVASIVE DUCTAL CARCINOMA OF BREAST, FEMALE, RIGHT (HCC): Primary | ICD-10-CM

## 2019-01-14 PROCEDURE — 99215 OFFICE O/P EST HI 40 MIN: CPT | Performed by: PHYSICIAN ASSISTANT

## 2019-01-14 NOTE — LETTER
January 14, 2019     Fernande Rinne, MD [address]  [address] [facility]    Patient: Guille Chi   YOB: 1970   Date of Visit: 1/14/2019       Dear Dr Brock Davis: Thank you for referring Guille Chi to me for evaluation  Below are my notes for this consultation  If you have questions, please do not hesitate to call me  I look forward to following your patient along with you  Sincerely,        Gerson Castellanos PA-C        CC: No Recipients  Gerson Castellanos PA-C  1/14/2019  9:38 AM  Sign at close encounter  [address] [id number] [phone]  Progress Note  Guille Chi, 1970, [id number]  1/14/2019    Assessment/Plan:  1  Essential thrombocytosis ([facility] )  This is a patient was found to have JAK2 gene mutation in the setting of elevated platelet count  Patient presently is under observation and is taking 81 mg aspirin daily  Patient's current platelet count is in the high 500 1000 per unit L range  Observation remains appropriate  Patient will follow up in 3 months with blood work prior     - CBC and differential; Future  - Comprehensive metabolic panel; Future    2  Invasive ductal carcinoma of breast, female, right [facility])  Patient had stage IA breast cancer, status post right partial mastectomy and radiation followed by tamoxifen x5 years  Patient had reconstructive surgery that required revision  Patient underwent revision in October 2018  Symptomatically, the patient is improved  She has follow-up with the reconstructive surgeon later this week  The patient is scheduled for follow-up in approximately 3 months with Dr Porsche Dasilva  Patient voiced agreement and understanding to the above  Patient knows to call the Hematology/Oncology office with any questions and concerns regarding the above  Carefully review your medication list in verify the list is accurate and up-to-date  Please call the hematologic/Oncology office if there medications missing from the less, medications on the list that your not currently taking or if there is a dosage or instruction that is different from higher taking medication  I have spent 20 minutes with Patient today in which greater than 50% of this time was spent in counseling/coordination of care regarding Diagnostic results, Prognosis, Risks and benefits of tx options, Intructions for management, Patient and family education and Impressions  Barrier(s) to care identified  None  The patient is able to self care     -------------------------------------------------------------------------------------------------------    Chief complaint:   Chief Complaint   Patient presents with    Follow-up     3 month; essential thrombocytopenia       History of present illness/Cancer History:   Oncology History    9/2018 - observation with 81 mg ASA daily        Invasive ductal carcinoma of breast, female, right ([facility] )    4/2009 Initial Diagnosis     Invasive ductal carcinoma of breast, female, right ([facility] )         4/2009 - 5/2009 Cancer Staged      T1 B  N0, ER/CT positive, HER-2 negative, infiltrating ductal carcinoma  Stage IA, right         5/11/2009 Surgery     Right partial mastectomy, SLNB, breast implant  Dr Ethel Orantes         5/2009 Genetic Testing     BRCA negative         2009 - 2009 Radiation     WBRT  Dr Kathleen Humphreys         6/2009 - 6/2014 Hormone Therapy     Tamoxifen           Essential thrombocytosis ([facility] )    6/13/2018 Initial Diagnosis     In the past, Plt count had increased and return to normal   Pt was screened for ET with Jorge mutation and was negative  Then in June 2018, platelet count elevated again and mutation testing was completed  8/23/2018 Genomic Testing     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  BCR-aBL was negative via FISH              Cancer Staging  Invasive ductal carcinoma of breast, female, right [facility])  Staging form: Breast, AJCC 7th Edition  - Pathologic: Stage IA (T1b, N0, cM0) - Signed by Heraclio Vann MD on 2018     ECO - Asymptomatic    Interval history:     Patient notes that she underwent breast revision on in 2018  Patient notes that the pain on her right side has mostly gone away  Patient states that that area was filled with fat in order to avoid irritation  Otherwise, patient states she feels well  No signs or symptoms of thromboembolism  Appetite is okay, weight is stable  Patient's goals in the next year are to start exercising more often  Exercise was inhibited by transition to a new job  Review of Systems   Constitutional: Negative for activity change, appetite change, chills, fatigue, fever and unexpected weight change  HENT: Negative for hearing loss, mouth sores, nosebleeds, sore throat, trouble swallowing and voice change  Eyes: Negative for visual disturbance  Respiratory: Negative for cough, choking and shortness of breath  Negative hemoptysis  Cardiovascular: Negative for chest pain, palpitations and leg swelling  Gastrointestinal: Negative  Negative for abdominal distention, abdominal pain, anal bleeding, blood in stool, constipation, diarrhea, nausea and vomiting  Endocrine: Negative  Negative for cold intolerance  Genitourinary: Negative  Negative for dysuria, hematuria and menstrual problem  Musculoskeletal: Negative  Negative for arthralgias, myalgias, neck pain and neck stiffness  Skin: Negative  Negative for color change, pallor and rash  Allergic/Immunologic: Negative  Negative for immunocompromised state  Neurological: Negative  Negative for dizziness, weakness, numbness and headaches  Hematological: Negative for adenopathy  Does not bruise/bleed easily  All other systems reviewed and are negative        Current Outpatient Prescriptions:     aspirin (ECOTRIN LOW STRENGTH) 81 mg EC tablet, Take 81 mg by mouth daily, Disp: , Rfl:     intrauterine copper (PARAGARD) IUD, 1 each by Intrauterine route once, Disp: , Rfl:     Multiple Vitamin (MULTIVITAMIN) capsule, Take by mouth, Disp: , Rfl:       No current facility-administered medications for this visit  Allergies   Allergen Reactions    Cephalexin Rash     Objective:   /76 (BP Location: Left arm, Patient Position: Sitting, Cuff Size: Adult)   Pulse 66   Temp (!) 97 1 °F (36 2 °C) (Temporal)   Resp 16   Ht 5' 3" (1 6 m)   Wt 52 8 kg (116 lb 6 4 oz)   SpO2 98%   BMI 20 62 kg/m²    Wt Readings from Last 3 Encounters:   01/14/19 52 8 kg (116 lb 6 4 oz)   09/13/18 52 kg (114 lb 9 6 oz)   06/28/18 50 5 kg (111 lb 6 4 oz)     Physical Exam   Constitutional: She is oriented to person, place, and time  She appears well-developed and well-nourished  No distress  HENT:   Head: Normocephalic and atraumatic  Mouth/Throat: Oropharynx is clear and moist  No oropharyngeal exudate  Eyes: Pupils are equal, round, and reactive to light  EOM are normal  No scleral icterus  Neck: Normal range of motion  Cardiovascular: Normal rate and regular rhythm  No murmur heard  Pulmonary/Chest: Effort normal and breath sounds normal  No respiratory distress  Abdominal: Soft  Bowel sounds are normal  She exhibits no distension  There is no tenderness  Musculoskeletal: Normal range of motion  She exhibits no edema  Lymphadenopathy:     She has no cervical adenopathy  Neurological: She is alert and oriented to person, place, and time  No cranial nerve deficit  Skin: Skin is warm  No rash noted  No pallor  Psychiatric: She has a normal mood and affect  Thought content normal        Pertinent Laboratory Results and Imaging Review:  Labs are completed through [facility] (?????)    Patient's hemoglobin is 14 5, hematocrit is 43 1, platelet count is 413, and WBC = 6 2, differential is within normal limits, BUN = 16, creatinine = 0 81, LFTs = WNL    The following historical data was reviewed  Past Medical History:   Diagnosis Date    Anxiety     Bacterial vaginosis     Breast cancer ([facility] )     History of infection due to human papilloma virus (HPV)     last assessed - [id number]    HPV (human papilloma virus) infection     Hx of radiation therapy     BALDEMAR-2 gene mutation     Moderate dysplasia of cervix (EMILI II)     last assessed - [id number]    Ovarian cyst     last assessed - 98DYT6391    Pap smear abnormality of cervix with ASCUS favoring dysplasia     last assessed - 22Ksl9983    Secondary amenorrhea     Thrombocytosis ([facility] )     Use of tamoxifen (Nolvadex)     last assessed - 83OZU4501       Past Surgical History:   Procedure Laterality Date    BREAST LUMPECTOMY Right     last assessed - 89SPN7314    BREAST RECONSTRUCTION Bilateral     bilateral implants    CERVICAL BIOPSY  W/ LOOP ELECTRODE EXCISION      COLPOSCOPY W/ BIOPSY / CURETTAGE      Colposcopy Cervix with Biopsy(s) with Endocervical Currettage    MASTECTOMY, PARTIAL Right 05/11/2009    right partial mastectomy re-excision 6/26/09    SENTINEL LYMPH NODE BIOPSY Right        Social History     Social History    Marital status: Single     Spouse name: N/A    Number of children: N/A    Years of education: N/A     Social History Main Topics    Smoking status: Former Smoker    Smokeless tobacco: Never Used    Alcohol use Yes      Comment: Social drinker    Drug use: No    Sexual activity: Yes     Partners: Male     Other Topics Concern    None     Social History Narrative    Caffeine use    Marital History -     Mormonism Affiliation - Pentecostalism    Uses safety equipment - Seat belts           Family History   Problem Relation Age of Onset    No Known Problems Mother     No Known Problems Father     Ovarian cancer Paternal Grandmother         age unknown    Colon polyps Cousin        Please note: This report has been generated by a voice recognition software system   Therefore there may be syntax, spelling, and/or grammatical errors  Please call if you have any questions

## 2019-01-14 NOTE — PROGRESS NOTES
[address] [facility] [address] [id number] [phone]  Progress Note  Jessica Reynolds, 1970, [id number]  1/14/2019    Assessment/Plan:  1  Essential thrombocytosis ([facility] [address] )  This is a patient was found to have JAK2 gene mutation in the setting of elevated platelet count  Patient presently is under observation and is taking 81 mg aspirin daily  Patient's current platelet count is in the high 500 1000 per unit L range  Observation remains appropriate  Patient will follow up in 3 months with blood work prior     - CBC and differential; Future  - Comprehensive metabolic panel; Future    2  Invasive ductal carcinoma of breast, female, right [facility])  Patient had stage IA breast cancer, status post right partial mastectomy and radiation followed by tamoxifen x5 years  Patient had reconstructive surgery that required revision  Patient underwent revision in October 2018  Symptomatically, the patient is improved  She has follow-up with the reconstructive surgeon later this week  The patient is scheduled for follow-up in approximately 3 months with Dr Jesus Helton  Patient voiced agreement and understanding to the above  Patient knows to call the Hematology/Oncology office with any questions and concerns regarding the above  Carefully review your medication list in verify the list is accurate and up-to-date  Please call the hematologic/Oncology office if there medications missing from the less, medications on the list that your not currently taking or if there is a dosage or instruction that is different from higher taking medication  I have spent 20 minutes with Patient today in which greater than 50% of this time was spent in counseling/coordination of care regarding Diagnostic results, Prognosis, Risks and benefits of tx options, Intructions for management, Patient and family education and Impressions      Barrier(s) to care identified  None  The patient is able to self care     -------------------------------------------------------------------------------------------------------    Chief complaint:   Chief Complaint   Patient presents with    Follow-up     3 month; essential thrombocytopenia       History of present illness/Cancer History:   Oncology History    2018 - observation with 81 mg ASA daily        Invasive ductal carcinoma of breast, female, right ([facility] [address] )    2009 Initial Diagnosis     Invasive ductal carcinoma of breast, female, right ([facility] )         2009 - 2009 Cancer Staged      T1 B  N0, ER/VA positive, HER-2 negative, infiltrating ductal carcinoma  Stage IA, right         2009 Surgery     Right partial mastectomy, SLNB, breast implant  Dr Bryan Kim         2009 Genetic Testing     BRCA negative          -  Radiation     WBRT  Dr Kayden Westfall         2009 - 2014 Hormone Therapy     Tamoxifen           Essential thrombocytosis ([facility] [address] )    2018 Initial Diagnosis     In the past, Plt count had increased and return to normal   Pt was screened for ET with Jorge mutation and was negative  Then in 2018, platelet count elevated again and mutation testing was completed  2018 Genomic Testing     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  BCR-aBL was negative via FISH  Cancer Staging  Invasive ductal carcinoma of breast, female, right [facility])  Staging form: Breast, AJCC 7th Edition  - Pathologic: Stage IA (T1b, N0, cM0) - Signed by Bridget Small MD on 2018     ECO - Asymptomatic    Interval history:     Patient notes that she underwent breast revision on in 2018  Patient notes that the pain on her right side has mostly gone away  Patient states that that area was filled with fat in order to avoid irritation  Otherwise, patient states she feels well  No signs or symptoms of thromboembolism  Appetite is okay, weight is stable    Patient's goals in the next year are to start exercising more often  Exercise was inhibited by transition to a new job  Review of Systems   Constitutional: Negative for activity change, appetite change, chills, fatigue, fever and unexpected weight change  HENT: Negative for hearing loss, mouth sores, nosebleeds, sore throat, trouble swallowing and voice change  Eyes: Negative for visual disturbance  Respiratory: Negative for cough, choking and shortness of breath  Negative hemoptysis  Cardiovascular: Negative for chest pain, palpitations and leg swelling  Gastrointestinal: Negative  Negative for abdominal distention, abdominal pain, anal bleeding, blood in stool, constipation, diarrhea, nausea and vomiting  Endocrine: Negative  Negative for cold intolerance  Genitourinary: Negative  Negative for dysuria, hematuria and menstrual problem  Musculoskeletal: Negative  Negative for arthralgias, myalgias, neck pain and neck stiffness  Skin: Negative  Negative for color change, pallor and rash  Allergic/Immunologic: Negative  Negative for immunocompromised state  Neurological: Negative  Negative for dizziness, weakness, numbness and headaches  Hematological: Negative for adenopathy  Does not bruise/bleed easily  All other systems reviewed and are negative  Current Outpatient Prescriptions:     aspirin (ECOTRIN LOW STRENGTH) 81 mg EC tablet, Take 81 mg by mouth daily, Disp: , Rfl:     intrauterine copper (PARAGARD) IUD, 1 each by Intrauterine route once, Disp: , Rfl:     Multiple Vitamin (MULTIVITAMIN) capsule, Take by mouth, Disp: , Rfl:       No current facility-administered medications for this visit        Allergies   Allergen Reactions    Cephalexin Rash     Objective:   /76 (BP Location: Left arm, Patient Position: Sitting, Cuff Size: Adult)   Pulse 66   Temp (!) 97 1 °F (36 2 °C) (Temporal)   Resp 16   Ht 5' 3" (1 6 m)   Wt 52 8 kg (116 lb 6 4 oz)   SpO2 98%   BMI 20 62 kg/m² Wt Readings from Last 3 Encounters:   01/14/19 52 8 kg (116 lb 6 4 oz)   09/13/18 52 kg (114 lb 9 6 oz)   06/28/18 50 5 kg (111 lb 6 4 oz)     Physical Exam   Constitutional: She is oriented to person, place, and time  She appears well-developed and well-nourished  No distress  HENT:   Head: Normocephalic and atraumatic  Mouth/Throat: Oropharynx is clear and moist  No oropharyngeal exudate  Eyes: Pupils are equal, round, and reactive to light  EOM are normal  No scleral icterus  Neck: Normal range of motion  Cardiovascular: Normal rate and regular rhythm  No murmur heard  Pulmonary/Chest: Effort normal and breath sounds normal  No respiratory distress  Abdominal: Soft  Bowel sounds are normal  She exhibits no distension  There is no tenderness  Musculoskeletal: Normal range of motion  She exhibits no edema  Lymphadenopathy:     She has no cervical adenopathy  Neurological: She is alert and oriented to person, place, and time  No cranial nerve deficit  Skin: Skin is warm  No rash noted  No pallor  Psychiatric: She has a normal mood and affect  Thought content normal        Pertinent Laboratory Results and Imaging Review:  Labs are completed through [facility]  Patient's hemoglobin is 14 5, hematocrit is 43 1, platelet count is 758, and WBC = 6 2, differential is within normal limits, BUN = 16, creatinine = 0 81, LFTs = WNL    The following historical data was reviewed      Past Medical History:   Diagnosis Date    Anxiety     Bacterial vaginosis     Breast cancer ([facility] [address] )     History of infection due to human papilloma virus (HPV)     last assessed - [id number]    HPV (human papilloma virus) infection     Hx of radiation therapy     BALDEMAR-2 gene mutation     Moderate dysplasia of cervix (EMILI II)     last assessed - [id number]    Ovarian cyst     last assessed - [id number]    Pap smear abnormality of cervix with ASCUS favoring dysplasia     last assessed - 31HNR6671    Secondary amenorrhea     Thrombocytosis (HCC)     Use of tamoxifen (Nolvadex)     last assessed - 58RTE6179       Past Surgical History:   Procedure Laterality Date    BREAST LUMPECTOMY Right     last assessed - [id number]    BREAST RECONSTRUCTION Bilateral     bilateral implants    CERVICAL BIOPSY  W/ LOOP ELECTRODE EXCISION      COLPOSCOPY W/ BIOPSY / CURETTAGE      Colposcopy Cervix with Biopsy(s) with Endocervical Currettage    MASTECTOMY, PARTIAL Right 05/11/2009    right partial mastectomy re-excision 6/26/09    SENTINEL LYMPH NODE BIOPSY Right        Social History     Social History    Marital status: Single     Spouse name: N/A    Number of children: N/A    Years of education: N/A     Social History Main Topics    Smoking status: Former Smoker    Smokeless tobacco: Never Used    Alcohol use Yes      Comment: Social drinker    Drug use: No    Sexual activity: Yes     Partners: Male     Other Topics Concern    None     Social History Narrative    Caffeine use    Marital History -     [employer] - [employer]    Uses safety equipment - Seat belts           Family History   Problem Relation Age of Onset    No Known Problems Mother     No Known Problems Father     Ovarian cancer Paternal Grandmother         age unknown    Colon polyps Cousin        Please note: This report has been generated by a voice recognition software system  Therefore there may be syntax, spelling, and/or grammatical errors  Please call if you have any questions

## 2019-04-16 ENCOUNTER — OFFICE VISIT (OUTPATIENT)
Dept: HEMATOLOGY ONCOLOGY | Facility: CLINIC | Age: 49
End: 2019-04-16
Payer: COMMERCIAL

## 2019-04-16 VITALS
OXYGEN SATURATION: 97 % | RESPIRATION RATE: 17 BRPM | HEART RATE: 75 BPM | SYSTOLIC BLOOD PRESSURE: 122 MMHG | DIASTOLIC BLOOD PRESSURE: 74 MMHG | TEMPERATURE: 97.9 F | HEIGHT: 63 IN | WEIGHT: 115.2 LBS | BODY MASS INDEX: 20.41 KG/M2

## 2019-04-16 DIAGNOSIS — D47.3 ESSENTIAL THROMBOCYTOSIS (HCC): Primary | ICD-10-CM

## 2019-04-16 PROCEDURE — 99215 OFFICE O/P EST HI 40 MIN: CPT | Performed by: INTERNAL MEDICINE

## 2019-05-09 ENCOUNTER — HOSPITAL ENCOUNTER (OUTPATIENT)
Dept: MAMMOGRAPHY | Facility: MEDICAL CENTER | Age: 49
Discharge: HOME | End: 2019-05-09
Payer: COMMERCIAL

## 2019-05-09 VITALS — BODY MASS INDEX: 20.38 KG/M2 | WEIGHT: 115 LBS | HEIGHT: 63 IN

## 2019-05-09 DIAGNOSIS — Z12.39 BREAST CANCER SCREENING, HIGH RISK PATIENT: ICD-10-CM

## 2019-05-09 PROCEDURE — 77067 SCR MAMMO BI INCL CAD: CPT

## 2019-05-09 PROCEDURE — 77063 BREAST TOMOSYNTHESIS BI: CPT

## 2019-05-21 ENCOUNTER — OFFICE VISIT (OUTPATIENT)
Dept: SURGICAL ONCOLOGY | Facility: CLINIC | Age: 49
End: 2019-05-21
Payer: COMMERCIAL

## 2019-05-21 VITALS
BODY MASS INDEX: 20.7 KG/M2 | SYSTOLIC BLOOD PRESSURE: 90 MMHG | HEART RATE: 80 BPM | HEIGHT: 63 IN | TEMPERATURE: 98.3 F | WEIGHT: 116.8 LBS | RESPIRATION RATE: 14 BRPM | DIASTOLIC BLOOD PRESSURE: 70 MMHG

## 2019-05-21 DIAGNOSIS — Z85.3 PERSONAL HISTORY OF ADENOCARCINOMA OF BREAST: ICD-10-CM

## 2019-05-21 DIAGNOSIS — Z08 ENCOUNTER FOR FOLLOW-UP SURVEILLANCE OF BREAST CANCER: Primary | ICD-10-CM

## 2019-05-21 DIAGNOSIS — Z85.3 ENCOUNTER FOR FOLLOW-UP SURVEILLANCE OF BREAST CANCER: Primary | ICD-10-CM

## 2019-05-21 DIAGNOSIS — Z12.39 BREAST CANCER SCREENING, HIGH RISK PATIENT: ICD-10-CM

## 2019-05-21 DIAGNOSIS — R92.2 DENSE BREAST TISSUE: ICD-10-CM

## 2019-05-21 PROBLEM — R92.30 DENSE BREAST TISSUE: Status: ACTIVE | Noted: 2019-05-21

## 2019-05-21 PROCEDURE — 99213 OFFICE O/P EST LOW 20 MIN: CPT | Performed by: SURGERY

## 2019-05-21 RX ORDER — VALACYCLOVIR HYDROCHLORIDE 1 G/1
TABLET, FILM COATED ORAL AS NEEDED
Refills: 1 | COMMUNITY
Start: 2019-04-16 | End: 2020-06-11 | Stop reason: CLARIF

## 2019-06-07 ENCOUNTER — ANNUAL EXAM (OUTPATIENT)
Dept: OBGYN CLINIC | Facility: CLINIC | Age: 49
End: 2019-06-07
Payer: COMMERCIAL

## 2019-06-07 VITALS
HEIGHT: 63 IN | WEIGHT: 114 LBS | DIASTOLIC BLOOD PRESSURE: 70 MMHG | SYSTOLIC BLOOD PRESSURE: 114 MMHG | BODY MASS INDEX: 20.2 KG/M2

## 2019-06-07 DIAGNOSIS — N95.1 PERIMENOPAUSE: Primary | ICD-10-CM

## 2019-06-07 PROCEDURE — G0124 SCREEN C/V THIN LAYER BY MD: HCPCS | Performed by: PATHOLOGY

## 2019-06-07 PROCEDURE — S0612 ANNUAL GYNECOLOGICAL EXAMINA: HCPCS | Performed by: OBSTETRICS & GYNECOLOGY

## 2019-06-07 PROCEDURE — G0145 SCR C/V CYTO,THINLAYER,RESCR: HCPCS | Performed by: PATHOLOGY

## 2019-06-07 PROCEDURE — 87624 HPV HI-RISK TYP POOLED RSLT: CPT | Performed by: OBSTETRICS & GYNECOLOGY

## 2019-06-12 LAB
HPV HR 12 DNA CVX QL NAA+PROBE: POSITIVE
HPV16 DNA CVX QL NAA+PROBE: NEGATIVE
HPV18 DNA CVX QL NAA+PROBE: NEGATIVE

## 2019-06-14 LAB
LAB AP GYN PRIMARY INTERPRETATION: ABNORMAL
Lab: ABNORMAL
PATH INTERP SPEC-IMP: ABNORMAL

## 2019-10-14 ENCOUNTER — OFFICE VISIT (OUTPATIENT)
Dept: HEMATOLOGY ONCOLOGY | Facility: CLINIC | Age: 49
End: 2019-10-14
Payer: COMMERCIAL

## 2019-10-14 VITALS
HEIGHT: 64 IN | HEART RATE: 78 BPM | SYSTOLIC BLOOD PRESSURE: 122 MMHG | BODY MASS INDEX: 19.97 KG/M2 | RESPIRATION RATE: 17 BRPM | DIASTOLIC BLOOD PRESSURE: 72 MMHG | TEMPERATURE: 98.7 F | WEIGHT: 117 LBS | OXYGEN SATURATION: 97 %

## 2019-10-14 DIAGNOSIS — D47.3 ESSENTIAL THROMBOCYTOSIS (HCC): Primary | ICD-10-CM

## 2019-10-14 PROCEDURE — 99213 OFFICE O/P EST LOW 20 MIN: CPT | Performed by: INTERNAL MEDICINE

## 2019-10-14 NOTE — PROGRESS NOTES
[facility]  [address] [address] [phone]    Stevensondarell Joshi,1970, [id number]  10/14/19    Discussion:   In summary, this is a 71-year-old female history of essential thrombocytosis as well as early stage breast cancer by history  Clinically she is doing well  She had breast revision surgery in February 2019  She had a reduction in local pain symptoms  Cosmesis is not yet optimized  She will be having a 2nd opinion in the near future regarding utility of another procedure for this purpose  Otherwise she feels fairly well  She has some small bruises on the extremities with minor trauma  No other bleeding symptoms  She has occasional hot flashes  Recent CBC shows stable platelet count at 723  Hemoglobin and white count are normal   She continues under observation  Follow-up in 3 months with repeat blood work  I discussed the above with the patient  The patient  voiced understanding and agreement   ______________________________________________________________________    Chief Complaint   Patient presents with    Follow-up       HPI:  Oncology History    9/2018 - observation with 81 mg ASA daily        Personal history of adenocarcinoma of breast    4/2009 Initial Diagnosis     Invasive ductal carcinoma of breast, female, right ([facility] [address] )      4/2009 - 5/2009 Cancer Staged      T1 B  N0, ER/AR positive, HER-2 negative, infiltrating ductal carcinoma  Stage IA, right      5/11/2009 Surgery     Right partial mastectomy, SLNB, breast implant  Dr Denys Parker      5/2009 Genetic Testing     BRCA negative      2009 - 2009 Radiation     WBRT  Dr Cheryl Winston      6/2009 - 6/2014 Hormone Therapy     Tamoxifen        Essential thrombocytosis ([facility] [address] )    6/13/2018 Initial Diagnosis     In the past, Plt count had increased and return to normal   Pt was screened for ET with Jorge mutation and was negative        Then in June 2018, platelet count elevated again and mutation testing was completed  8/23/2018 Genomic Testing     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  BCR-aBL was negative via FISH  Interval History:  Clinically stable  ECOG-  1 - Symptomatic but completely ambulatory    Review of Systems   Constitutional: Negative for appetite change, diaphoresis, fatigue and fever  HENT: Negative for sinus pain  Eyes: Negative for discharge  Respiratory: Negative for cough and shortness of breath  Cardiovascular: Negative for chest pain  Gastrointestinal: Negative for abdominal pain, constipation and diarrhea  Endocrine: Negative for cold intolerance  Genitourinary: Negative for difficulty urinating and hematuria  Musculoskeletal: Negative for joint swelling  Skin: Negative for rash  Allergic/Immunologic: Negative for environmental allergies  Neurological: Negative for dizziness and headaches  Hematological: Negative for adenopathy  Psychiatric/Behavioral: Negative for agitation         Past Medical History:   Diagnosis Date    Anxiety     Bacterial vaginosis     History of infection due to human papilloma virus (HPV)     last assessed - [id number]    HPV (human papilloma virus) infection     Hx of radiation therapy     BALDEMAR-2 gene mutation     Moderate dysplasia of cervix (EMILI II)     last assessed - 20Hkl7433    Ovarian cyst     last assessed - 22FNV2287    Pap smear abnormality of cervix with ASCUS favoring dysplasia     last assessed - 70Rny0490    Secondary amenorrhea     Thrombocytosis ([facility] [address] [facility]     Use of tamoxifen (Nolvadex)     last assessed - 99IHE4019    Varicella      Patient Active Problem List   Diagnosis    Personal history of adenocarcinoma of breast    Breast cancer screening, high risk patient    Essential thrombocytosis ([facility] [address] )    Encounter for follow-up surveillance of breast cancer    Dense breast tissue       Current Outpatient Medications:     aspirin (ECOTRIN LOW STRENGTH) 81 mg EC tablet, Take 81 mg by mouth daily, Disp: , Rfl:     intrauterine copper (PARAGARD) IUD, 1 each by Intrauterine route once, Disp: , Rfl:     Multiple Vitamin (MULTIVITAMIN) capsule, Take by mouth, Disp: , Rfl:     valACYclovir (VALTREX) 1,000 mg tablet, TAKE 2 TABLETS ON DAY OF PROCEDURE THEN 2 TABLETS AFTER 12 HOURS, Disp: , Rfl: 1  Allergies   Allergen Reactions    Cephalexin Rash     Past Surgical History:   Procedure Laterality Date    BREAST LUMPECTOMY Right     last assessed - [id number]    BREAST LUMPECTOMY Right 05/11/2009    BREAST LUMPECTOMY Right 06/05/2009    BREAST RECONSTRUCTION Bilateral 10/2018    bilateral implants    BREAST RECONSTRUCTION Right 02/2019    right implant    CERVICAL BIOPSY  W/ LOOP ELECTRODE EXCISION      COLPOSCOPY W/ BIOPSY / CURETTAGE      Colposcopy Cervix with Biopsy(s) with Endocervical Currettage    MASTECTOMY, PARTIAL Right 05/11/2009    right partial mastectomy re-excision 6/26/09    SENTINEL LYMPH NODE BIOPSY Right      Social History     Objective:  Vitals:    10/14/19 1006   BP: 122/72   BP Location: Left arm   Patient Position: Sitting   Pulse: 78   Resp: 17   Temp: 98 7 °F (37 1 °C)   TempSrc: Tympanic   SpO2: 97%   Weight: 53 1 kg (117 lb)   Height: 5' 4" (1 626 m)     Physical Exam   Constitutional: She is oriented to person, place, and time  She appears well-developed  HENT:   Head: Normocephalic  Eyes: Pupils are equal, round, and reactive to light  Neck: Neck supple  Cardiovascular: Normal rate  No murmur heard  Pulmonary/Chest: No respiratory distress  She has no wheezes  She has no rales  Abdominal: Soft  She exhibits no distension  There is no tenderness  There is no rebound  Musculoskeletal: She exhibits no edema  Lymphadenopathy:     She has no cervical adenopathy  Neurological: She is alert and oriented to person, place, and time  She displays normal reflexes  Skin: Skin is warm  No rash noted     Psychiatric: She has a normal mood and affect  Thought content normal          Labs: I personally reviewed the labs and imaging pertinent to this patient care

## 2020-01-16 ENCOUNTER — OFFICE VISIT (OUTPATIENT)
Dept: HEMATOLOGY ONCOLOGY | Facility: CLINIC | Age: 50
End: 2020-01-16
Payer: COMMERCIAL

## 2020-01-16 VITALS
RESPIRATION RATE: 16 BRPM | HEART RATE: 76 BPM | HEIGHT: 63 IN | DIASTOLIC BLOOD PRESSURE: 70 MMHG | TEMPERATURE: 99 F | OXYGEN SATURATION: 96 % | WEIGHT: 118.8 LBS | SYSTOLIC BLOOD PRESSURE: 116 MMHG | BODY MASS INDEX: 21.05 KG/M2

## 2020-01-16 DIAGNOSIS — D47.3 ESSENTIAL THROMBOCYTOSIS (HCC): Primary | ICD-10-CM

## 2020-01-16 PROCEDURE — 99214 OFFICE O/P EST MOD 30 MIN: CPT | Performed by: INTERNAL MEDICINE

## 2020-01-16 NOTE — PROGRESS NOTES
[facility] [place]  [address] [id number] [phone]    Ramsey Joshi,1970, [id number]  01/16/20    Discussion:   In summary, this is a 57-year-old female history of stage I breast cancer status post treatment as outlined  She is perimenopausal   Menses are infrequent  She has occasional hot flashes  Her last menstrual bleeding was less than 1 year ago  She continues under observation  She had some reconstructive surgery in October and believes that she is done with that  Platelet count is 165, stable in her established range over the past 13 months, 600-700  She has no bleeding or thrombotic symptoms  She continues on aspirin 81 mg p o  Daily  Observation is favored  She will get a CBC on a Q 6 month basis  I will see her back in 1 year for review  I discussed the above with the patient  The patient  voiced understanding and agreement   ______________________________________________________________________    No chief complaint on file  HPI:  Oncology History    9/2018 - observation with 81 mg ASA daily        Personal history of adenocarcinoma of breast    4/2009 Initial Diagnosis     Invasive ductal carcinoma of breast, female, right ([facility] [address] )      4/2009 - 5/2009 Cancer Staged      T1 B  N0, ER/TN positive, HER-2 negative, infiltrating ductal carcinoma  Stage IA, right      5/11/2009 Surgery     Right partial mastectomy, SLNB, breast implant  Dr Esau Mcbride      5/2009 Genetic Testing     BRCA negative      2009 - 2009 Radiation     WBRT  Dr Cornell Whittaker      6/2009 - 6/2014 Hormone Therapy     Tamoxifen        Essential thrombocytosis ([facility] [address] )    6/13/2018 Initial Diagnosis     In the past, Plt count had increased and return to normal   Pt was screened for ET with Jorge mutation and was negative  Then in June 2018, platelet count elevated again and mutation testing was completed         8/23/2018 Genomic Testing     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  BCR-aBL was negative via FISH  Interval History:  Clinically stable  ECOG-  0 - Asymptomatic    Review of Systems   Constitutional: Negative for appetite change, diaphoresis, fatigue and fever  HENT: Negative for sinus pain  Eyes: Negative for discharge  Respiratory: Negative for cough and shortness of breath  Cardiovascular: Negative for chest pain  Gastrointestinal: Negative for abdominal pain, constipation and diarrhea  Endocrine: Negative for cold intolerance  Genitourinary: Negative for difficulty urinating and hematuria  Musculoskeletal: Negative for joint swelling  Skin: Negative for rash  Allergic/Immunologic: Negative for environmental allergies  Neurological: Negative for dizziness and headaches  Hematological: Negative for adenopathy  Psychiatric/Behavioral: Negative for agitation         Past Medical History:   Diagnosis Date    Anxiety     Bacterial vaginosis     History of infection due to human papilloma virus (HPV)     last assessed - [id number]    HPV (human papilloma virus) infection     Hx of radiation therapy     BALDEMAR-2 gene mutation     Moderate dysplasia of cervix (EMILI II)     last assessed - 70Pxw8624    Ovarian cyst     last assessed - 37ANX0404    Pap smear abnormality of cervix with ASCUS favoring dysplasia     last assessed - 48Wmf5385    Secondary amenorrhea     Thrombocytosis ([facility] [address] )     Use of tamoxifen (Nolvadex)     last assessed - 36NVE5679    Varicella      Patient Active Problem List   Diagnosis    Personal history of adenocarcinoma of breast    Breast cancer screening, high risk patient    Essential thrombocytosis ([facility] [address] )    Encounter for follow-up surveillance of breast cancer    Dense breast tissue       Current Outpatient Medications:     aspirin (ECOTRIN LOW STRENGTH) 81 mg EC tablet, Take 81 mg by mouth daily, Disp: , Rfl:     intrauterine copper (PARAGARD) IUD, 1 each by Intrauterine route once, Disp: , Rfl:     Multiple Vitamin (MULTIVITAMIN) capsule, Take by mouth, Disp: , Rfl:     valACYclovir (VALTREX) 1,000 mg tablet, TAKE 2 TABLETS ON DAY OF PROCEDURE THEN 2 TABLETS AFTER 12 HOURS, Disp: , Rfl: 1  Allergies   Allergen Reactions    Cephalexin Rash     Past Surgical History:   Procedure Laterality Date    BREAST LUMPECTOMY Right     last assessed - [id number]    BREAST LUMPECTOMY Right 05/11/2009    BREAST LUMPECTOMY Right 06/05/2009    BREAST RECONSTRUCTION Bilateral 10/2018    bilateral implants    BREAST RECONSTRUCTION Right 02/2019    right implant    CERVICAL BIOPSY  W/ LOOP ELECTRODE EXCISION      COLPOSCOPY W/ BIOPSY / CURETTAGE      Colposcopy Cervix with Biopsy(s) with Endocervical Currettage    MASTECTOMY, PARTIAL Right 05/11/2009    right partial mastectomy re-excision 6/26/09    SENTINEL LYMPH NODE BIOPSY Right      Social History     Objective: There were no vitals filed for this visit  Physical Exam   Constitutional: She is oriented to person, place, and time  She appears well-developed  HENT:   Head: Normocephalic  Eyes: Pupils are equal, round, and reactive to light  Neck: Neck supple  Cardiovascular: Normal rate  No murmur heard  Pulmonary/Chest: No respiratory distress  She has no wheezes  She has no rales  Abdominal: Soft  She exhibits no distension  There is no tenderness  There is no rebound  Musculoskeletal: She exhibits no edema  Lymphadenopathy:     She has no cervical adenopathy  Neurological: She is alert and oriented to person, place, and time  She displays normal reflexes  Skin: Skin is warm  No rash noted  Psychiatric: She has a normal mood and affect  Thought content normal          Labs: I personally reviewed the labs and imaging pertinent to this patient care

## 2020-05-11 ENCOUNTER — HOSPITAL ENCOUNTER (OUTPATIENT)
Dept: MAMMOGRAPHY | Facility: MEDICAL CENTER | Age: 50
Discharge: HOME | End: 2020-05-11
Payer: COMMERCIAL

## 2020-05-11 VITALS — WEIGHT: 118 LBS | BODY MASS INDEX: 20.91 KG/M2 | HEIGHT: 63 IN

## 2020-05-11 DIAGNOSIS — Z12.39 BREAST CANCER SCREENING, HIGH RISK PATIENT: ICD-10-CM

## 2020-05-11 PROCEDURE — 77063 BREAST TOMOSYNTHESIS BI: CPT

## 2020-05-11 PROCEDURE — 77067 SCR MAMMO BI INCL CAD: CPT

## 2020-05-19 ENCOUNTER — OFFICE VISIT (OUTPATIENT)
Dept: SURGICAL ONCOLOGY | Facility: CLINIC | Age: 50
End: 2020-05-19
Payer: COMMERCIAL

## 2020-05-19 VITALS
HEART RATE: 66 BPM | DIASTOLIC BLOOD PRESSURE: 80 MMHG | BODY MASS INDEX: 20.41 KG/M2 | WEIGHT: 115.2 LBS | TEMPERATURE: 97.4 F | SYSTOLIC BLOOD PRESSURE: 122 MMHG | HEIGHT: 63 IN | RESPIRATION RATE: 18 BRPM

## 2020-05-19 DIAGNOSIS — R92.2 DENSE BREAST TISSUE: ICD-10-CM

## 2020-05-19 DIAGNOSIS — Z12.39 BREAST CANCER SCREENING, HIGH RISK PATIENT: ICD-10-CM

## 2020-05-19 DIAGNOSIS — Z85.3 ENCOUNTER FOR FOLLOW-UP SURVEILLANCE OF BREAST CANCER: Primary | ICD-10-CM

## 2020-05-19 DIAGNOSIS — Z85.3 PERSONAL HISTORY OF ADENOCARCINOMA OF BREAST: ICD-10-CM

## 2020-05-19 DIAGNOSIS — Z08 ENCOUNTER FOR FOLLOW-UP SURVEILLANCE OF BREAST CANCER: Primary | ICD-10-CM

## 2020-05-19 PROCEDURE — 99213 OFFICE O/P EST LOW 20 MIN: CPT | Performed by: SURGERY

## 2020-06-11 ENCOUNTER — ANNUAL EXAM (OUTPATIENT)
Dept: OBGYN CLINIC | Facility: CLINIC | Age: 50
End: 2020-06-11
Payer: COMMERCIAL

## 2020-06-11 VITALS — WEIGHT: 114.8 LBS | BODY MASS INDEX: 20.34 KG/M2 | SYSTOLIC BLOOD PRESSURE: 110 MMHG | DIASTOLIC BLOOD PRESSURE: 70 MMHG

## 2020-06-11 DIAGNOSIS — Z01.419 ENCOUNTER FOR ANNUAL ROUTINE GYNECOLOGICAL EXAMINATION: ICD-10-CM

## 2020-06-11 DIAGNOSIS — Z12.31 ENCOUNTER FOR SCREENING MAMMOGRAM FOR BREAST CANCER: ICD-10-CM

## 2020-06-11 DIAGNOSIS — Z11.51 SCREENING FOR HPV (HUMAN PAPILLOMAVIRUS): ICD-10-CM

## 2020-06-11 DIAGNOSIS — N87.0 MILD DYSPLASIA OF CERVIX (CIN I): ICD-10-CM

## 2020-06-11 DIAGNOSIS — Z12.4 CERVICAL CANCER SCREENING: Primary | ICD-10-CM

## 2020-06-11 PROCEDURE — G0145 SCR C/V CYTO,THINLAYER,RESCR: HCPCS | Performed by: PATHOLOGY

## 2020-06-11 PROCEDURE — 87624 HPV HI-RISK TYP POOLED RSLT: CPT | Performed by: OBSTETRICS & GYNECOLOGY

## 2020-06-11 PROCEDURE — S0612 ANNUAL GYNECOLOGICAL EXAMINA: HCPCS | Performed by: OBSTETRICS & GYNECOLOGY

## 2020-06-11 PROCEDURE — G0124 SCREEN C/V THIN LAYER BY MD: HCPCS | Performed by: PATHOLOGY

## 2020-06-18 ENCOUNTER — PROCEDURE VISIT (OUTPATIENT)
Dept: OBGYN CLINIC | Facility: CLINIC | Age: 50
End: 2020-06-18
Payer: COMMERCIAL

## 2020-06-18 VITALS — DIASTOLIC BLOOD PRESSURE: 70 MMHG | BODY MASS INDEX: 20.34 KG/M2 | WEIGHT: 114.8 LBS | SYSTOLIC BLOOD PRESSURE: 118 MMHG

## 2020-06-18 DIAGNOSIS — R87.619 ATYPICAL GLANDULAR CELLS OF UNDETERMINED SIGNIFICANCE (AGUS) ON CERVICAL PAP SMEAR: Primary | ICD-10-CM

## 2020-06-18 PROCEDURE — 88305 TISSUE EXAM BY PATHOLOGIST: CPT | Performed by: PATHOLOGY

## 2020-06-19 PROCEDURE — 57456 ENDOCERV CURETTAGE W/SCOPE: CPT | Performed by: OBSTETRICS & GYNECOLOGY

## 2020-06-19 PROCEDURE — NC001 PR NO CHARGE: Performed by: OBSTETRICS & GYNECOLOGY

## 2020-06-25 ENCOUNTER — DOCUMENTATION (OUTPATIENT)
Dept: OBGYN CLINIC | Facility: CLINIC | Age: 50
End: 2020-06-25

## 2020-06-25 LAB
LAB AP GYN PRIMARY INTERPRETATION: ABNORMAL
Lab: ABNORMAL
PATH INTERP SPEC-IMP: ABNORMAL

## 2020-09-28 NOTE — MISCELLANEOUS
Message   Recorded as Task   Date: 03/02/2017 06:49 AM, Created By: Annamarie Sanz   Task Name: Go to Result   Assigned To: Kay Range   Regarding Patient: NAIN DANIELS, Status: In Progress   Comment:    Annamarie Sanz - 02 Mar 2017 6:49 AM     TASK CREATED  ASCUS +HPV pap, recommend colposcopy, also there was a suggestion of BV on her pap, would treat with metrogel prior to colpo   Gretchen Ang - 02 Mar 2017 10:24 AM     TASK IN PROGRESS   Gretchen Ang - 02 Mar 2017 11:17 AM     TASK EDITED  pt will call back and schedule colpo           Active Problems    1  Abnormal Pap Smear: ASCUS Favor Dysplasia (795 02)   2  Anxiety (300 00) (F41 9)   3  Bacterial vaginosis (616 10,041 9) (N76 0,B96 89)   4  Breast cancer (174 9) (C50 919)   5  Breast self examination education, encounter for (V65 49) (Z71 89)   6  Cervical cancer screening (V76 2) (Z12 4)   7  Encounter for cervical Pap smear with pelvic exam (V76 2,V72 31) (Z01 419)   8  History of thrombocytosis (V12 3) (Z86 2)   9  Human papilloma virus infection (079 4) (B97 7)   10  Moderate dysplasia of cervix (EMILI II) (622 12) (N87 1)   11  Ovarian cyst (620 2) (N83 20)   12  Pap smear abnormality of cervix with ASCUS favoring benign (795 01) (R87 610)   13  Screening for HPV (human papillomavirus) (V73 81) (Z11 51)   14  Secondary amenorrhea (626 0) (N91 1)   15  Visit for routine gyn exam (V72 31) (Z01 419)    Current Meds   1  Multivitamins CAPS; Therapy: (Recorded:06Qoy1534) to Recorded    Allergies    1  Cephalexin CAPS    2  No Known Environmental Allergies   3   No Known Food Allergies    Signatures   Electronically signed by : Shlomo Maxwell, ; Mar  2 2017 11:17AM EST                       (Author) How Severe Is Your Acne?: mild Is This A New Presentation, Or A Follow-Up?: Acne

## 2020-12-03 ENCOUNTER — OFFICE VISIT (OUTPATIENT)
Dept: OBGYN CLINIC | Facility: CLINIC | Age: 50
End: 2020-12-03
Payer: COMMERCIAL

## 2020-12-03 VITALS — WEIGHT: 115.2 LBS | DIASTOLIC BLOOD PRESSURE: 80 MMHG | BODY MASS INDEX: 20.41 KG/M2 | SYSTOLIC BLOOD PRESSURE: 120 MMHG

## 2020-12-03 DIAGNOSIS — N87.0 MILD DYSPLASIA OF CERVIX (CIN I): Primary | ICD-10-CM

## 2020-12-03 PROCEDURE — 88175 CYTOPATH C/V AUTO FLUID REDO: CPT | Performed by: OBSTETRICS & GYNECOLOGY

## 2020-12-03 PROCEDURE — 99213 OFFICE O/P EST LOW 20 MIN: CPT | Performed by: OBSTETRICS & GYNECOLOGY

## 2020-12-03 PROCEDURE — 87624 HPV HI-RISK TYP POOLED RSLT: CPT | Performed by: OBSTETRICS & GYNECOLOGY

## 2020-12-05 LAB
HPV HR 12 DNA CVX QL NAA+PROBE: NEGATIVE
HPV16 DNA CVX QL NAA+PROBE: NEGATIVE
HPV18 DNA CVX QL NAA+PROBE: NEGATIVE

## 2020-12-09 LAB
LAB AP GYN PRIMARY INTERPRETATION: NORMAL
Lab: NORMAL

## 2020-12-28 ENCOUNTER — TELEPHONE (OUTPATIENT)
Dept: HEMATOLOGY ONCOLOGY | Facility: CLINIC | Age: 50
End: 2020-12-28

## 2021-01-28 ENCOUNTER — OFFICE VISIT (OUTPATIENT)
Dept: HEMATOLOGY ONCOLOGY | Facility: CLINIC | Age: 51
End: 2021-01-28
Payer: COMMERCIAL

## 2021-01-28 VITALS
SYSTOLIC BLOOD PRESSURE: 116 MMHG | HEART RATE: 71 BPM | OXYGEN SATURATION: 97 % | HEIGHT: 64 IN | DIASTOLIC BLOOD PRESSURE: 72 MMHG | BODY MASS INDEX: 19.74 KG/M2 | WEIGHT: 115.6 LBS | TEMPERATURE: 97.9 F | RESPIRATION RATE: 17 BRPM

## 2021-01-28 DIAGNOSIS — D47.3 ESSENTIAL THROMBOCYTOSIS (HCC): Primary | ICD-10-CM

## 2021-01-28 PROCEDURE — 99215 OFFICE O/P EST HI 40 MIN: CPT | Performed by: INTERNAL MEDICINE

## 2021-01-28 NOTE — PROGRESS NOTES
[facility] HEMATOLOGY ONCOLOGY [facility] [place]  [address] [address]  [phone]    Jm Joshi,1970, [id number]  01/28/21    Discussion:   In summary, this is a 79-year-old female history of essential thrombocytosis, JAK2 positive  Clinically she is stable  She continues on aspirin 81 mg p o  Daily  Platelets have risen slightly but more noticeably over the past 3 years  We reviewed that some practitioners would use a cutoff of 1 million for institution of cyto reduction  She tells me that her father has elevated platelets  Essential thrombocytosis is generally not considered to be hereditary  We will confer with genetic counseling regarding applicability  Lastly, she tells me that she has a pruritic sensation over the thighs when walking outdoors  She does not have this when walking on a treadmill in doors  It is fairly consistent finding after 5-15 minutes of walking  I suggested trying an antihistamine  If this has significant impact it suggest the possibility of some allergic phenomenon and allergy/immunology consultation might be reasonable  This does not sound like a erythromelalgia  She is clear that this is not temperature related as it occurs at any time of the year  I discussed the above with the patient  The patient  voiced understanding and agreement   ______________________________________________________________________    Chief Complaint   Patient presents with    Follow-up       HPI:  Oncology History Overview Note   9/2018 - observation with 81 mg ASA daily     Personal history of adenocarcinoma of breast   4/2009 Initial Diagnosis    Invasive ductal carcinoma of breast, female, right ([facility] [address] )     4/2009 - 5/2009 Cancer Staged     T1 B  N0, ER/NV positive, HER-2 negative, infiltrating ductal carcinoma  Stage IA, right     5/11/2009 Surgery    Right partial mastectomy, SLNB, breast implant    Dr Krystal Pepper     5/2009 Genetic Testing BRCA negative     2009 - 2009 Radiation    WBRT  Dr Evans Para     6/2009 - 6/2014 Hormone Therapy    Tamoxifen     Essential thrombocytosis ([facility] [address] )   6/13/2018 Initial Diagnosis    In the past, Plt count had increased and return to normal   Pt was screened for ET with Jorge mutation and was negative  Then in June 2018, platelet count elevated again and mutation testing was completed  8/23/2018 Genomic Testing    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  BCR-aBL was negative via FISH  Interval History:  Clinically stable  ECOG-  1 - Symptomatic but completely ambulatory    Review of Systems   Constitutional: Negative for appetite change, diaphoresis, fatigue and fever  HENT: Negative for sinus pain  Eyes: Negative for discharge  Respiratory: Negative for cough and shortness of breath  Cardiovascular: Negative for chest pain  Gastrointestinal: Negative for abdominal pain, constipation and diarrhea  Endocrine: Negative for cold intolerance  Genitourinary: Negative for difficulty urinating and hematuria  Musculoskeletal: Negative for joint swelling  Skin: Negative for rash  Allergic/Immunologic: Negative for environmental allergies  Neurological: Negative for dizziness and headaches  Hematological: Negative for adenopathy  Psychiatric/Behavioral: Negative for agitation         Past Medical History:   Diagnosis Date    Anxiety     Bacterial vaginosis     History of infection due to human papilloma virus (HPV)     last assessed - [id number]    HPV (human papilloma virus) infection     Hx of radiation therapy     JORGE-2 gene mutation     Moderate dysplasia of cervix (EMILI II)     last assessed - [id number]    Ovarian cyst     last assessed - [id number]    Pap smear abnormality of cervix with ASCUS favoring dysplasia     last assessed - [id number]    Secondary amenorrhea     Thrombocytosis ([facility] [address] )     Use of tamoxifen (Nolvadex)     last assessed - [id number]    Varicella Patient Active Problem List   Diagnosis    Personal history of adenocarcinoma of breast    Breast cancer screening, high risk patient    Essential thrombocytosis ([facility] [address] )    Encounter for follow-up surveillance of breast cancer    Dense breast tissue       Current Outpatient Medications:     aspirin (ECOTRIN LOW STRENGTH) 81 mg EC tablet, Take 81 mg by mouth daily, Disp: , Rfl:     intrauterine copper (PARAGARD) IUD, 1 each by Intrauterine route once, Disp: , Rfl:     Multiple Vitamin (MULTIVITAMIN) capsule, Take by mouth, Disp: , Rfl:   Allergies   Allergen Reactions    Cephalexin Rash     Past Surgical History:   Procedure Laterality Date    BREAST LUMPECTOMY Right     last assessed - [id number]    BREAST LUMPECTOMY Right 05/11/2009    BREAST LUMPECTOMY Right 06/05/2009    BREAST RECONSTRUCTION Bilateral 10/2018    bilateral implants    BREAST RECONSTRUCTION Right 02/2019    right implant    CERVICAL BIOPSY  W/ LOOP ELECTRODE EXCISION      COLPOSCOPY W/ BIOPSY / CURETTAGE      Colposcopy Cervix with Biopsy(s) with Endocervical Currettage    MASTECTOMY, PARTIAL Right 05/11/2009    right partial mastectomy re-excision 6/26/09    SENTINEL LYMPH NODE BIOPSY Right      Social History     Objective:  Vitals:    01/28/21 0856   BP: 116/72   BP Location: Left arm   Patient Position: Sitting   Pulse: 71   Resp: 17   Temp: 97 9 °F (36 6 °C)   TempSrc: Tympanic   SpO2: 97%   Weight: 52 4 kg (115 lb 9 6 oz)   Height: 5' 3 5" (1 613 m)     Physical Exam  Constitutional:       Appearance: She is well-developed  HENT:      Head: Normocephalic and atraumatic  Eyes:      Pupils: Pupils are equal, round, and reactive to light  Neck:      Musculoskeletal: Neck supple  Cardiovascular:      Rate and Rhythm: Normal rate  Heart sounds: No murmur  Pulmonary:      Effort: No respiratory distress  Breath sounds: No wheezing or rales  Abdominal:      General: There is no distension        Palpations: Abdomen is soft  Tenderness: There is no abdominal tenderness  There is no rebound  Musculoskeletal:         General: No tenderness  Lymphadenopathy:      Cervical: No cervical adenopathy  Skin:     General: Skin is warm  Findings: No rash  Neurological:      Mental Status: She is alert and oriented to person, place, and time  Deep Tendon Reflexes: Reflexes normal    Psychiatric:         Thought Content: Thought content normal            Labs: I personally reviewed the labs and imaging pertinent to this patient care

## 2021-02-19 ENCOUNTER — OFFICE VISIT (OUTPATIENT)
Dept: FAMILY MEDICINE CLINIC | Facility: CLINIC | Age: 51
End: 2021-02-19
Payer: COMMERCIAL

## 2021-02-19 VITALS
DIASTOLIC BLOOD PRESSURE: 72 MMHG | OXYGEN SATURATION: 99 % | HEART RATE: 84 BPM | BODY MASS INDEX: 19.94 KG/M2 | WEIGHT: 116.8 LBS | SYSTOLIC BLOOD PRESSURE: 114 MMHG | TEMPERATURE: 97.4 F | HEIGHT: 64 IN | RESPIRATION RATE: 18 BRPM

## 2021-02-19 DIAGNOSIS — Z00.00 ANNUAL PHYSICAL EXAM: ICD-10-CM

## 2021-02-19 DIAGNOSIS — Z13.89 SCREENING FOR GENITOURINARY CONDITION: ICD-10-CM

## 2021-02-19 DIAGNOSIS — Z13.1 SCREENING FOR DIABETES MELLITUS: ICD-10-CM

## 2021-02-19 DIAGNOSIS — F43.0 ANXIETY IN ACUTE STRESS REACTION: ICD-10-CM

## 2021-02-19 DIAGNOSIS — Z13.21 ENCOUNTER FOR VITAMIN DEFICIENCY SCREENING: ICD-10-CM

## 2021-02-19 DIAGNOSIS — Z13.220 LIPID SCREENING: ICD-10-CM

## 2021-02-19 DIAGNOSIS — F41.1 ANXIETY IN ACUTE STRESS REACTION: ICD-10-CM

## 2021-02-19 DIAGNOSIS — Z13.29 THYROID DISORDER SCREENING: Primary | ICD-10-CM

## 2021-02-19 DIAGNOSIS — R51.9 THROBBING HEADACHE: ICD-10-CM

## 2021-02-19 DIAGNOSIS — Z85.3 PERSONAL HISTORY OF ADENOCARCINOMA OF BREAST: ICD-10-CM

## 2021-02-19 PROCEDURE — 99396 PREV VISIT EST AGE 40-64: CPT | Performed by: INTERNAL MEDICINE

## 2021-02-19 PROCEDURE — 3008F BODY MASS INDEX DOCD: CPT | Performed by: INTERNAL MEDICINE

## 2021-02-19 PROCEDURE — 3725F SCREEN DEPRESSION PERFORMED: CPT | Performed by: INTERNAL MEDICINE

## 2021-02-19 PROCEDURE — 1036F TOBACCO NON-USER: CPT | Performed by: INTERNAL MEDICINE

## 2021-02-19 RX ORDER — LORAZEPAM 0.5 MG/1
0.5 TABLET ORAL DAILY PRN
Qty: 20 TABLET | Refills: 0 | Status: SHIPPED | OUTPATIENT
Start: 2021-02-19 | End: 2022-02-25 | Stop reason: SDUPTHER

## 2021-02-19 NOTE — PROGRESS NOTES
Assessment/Plan:         Diagnoses and all orders for this visit:  Annual physical exam    Thyroid disorder screening  -     TSH, 3rd generation with Free T4 reflex      Throbbing headache  -     CT head wo contrast; Future    Anxiety in acute stress reaction  -     LORazepam (ATIVAN) 0 5 mg tablet; Take 1 tablet (0 5 mg total) by mouth daily as needed for anxiety    Lipid screening  -     Lipid panel    Screening for genitourinary condition  -     Urinalysis with microscopic    Encounter for vitamin deficiency screening  -     Vitamin D 25 hydroxy    Screening for diabetes mellitus  -     Comprehensive metabolic panel        Subjective:      Patient ID: Jose Luis Bruno is a 54 y o  female  HPI   patient is here for annual physical after 3 years of absence  Recent appoint with Hematology breast surgeon gyn was reviewed  CB shows persistent erythrocytosis with platelet counts of 381  Patient lately has been experiencing headaches throbbing in nature right-sided lasting for several minutes  She has been noticing some vision changes however had a thorough eye examination  She does report occasional numbness and tingling in her legs 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  No focal weakness  Denies any nausea  She has been experiencing neck discomfort as well  She does suffer from anxiety and lately has lost a family member which has trigger her anxiety  Desensitization techniques were discussed with patient  Given patient's history of renal carcinoma of the breast with the onset of headache I would order CT scan of the head  The following portions of the patient's history were reviewed and updated as appropriate: allergies, current medications, past family history, past medical history, past social history, past surgical history and problem list     Review of Systems   Constitutional: Negative for appetite change, chills, fatigue, fever and unexpected weight change     HENT: Negative for congestion, hearing loss, postnasal drip, trouble swallowing and voice change  Eyes: Negative for pain and visual disturbance  Respiratory: Negative for cough, chest tightness and shortness of breath  Cardiovascular: Negative for chest pain, palpitations and leg swelling  Gastrointestinal: Negative for abdominal pain, blood in stool, constipation, diarrhea, nausea and vomiting  Endocrine: Negative for cold intolerance, heat intolerance, polydipsia and polyphagia  Genitourinary: Negative for difficulty urinating, flank pain, frequency and hematuria  Musculoskeletal: Negative for arthralgias, back pain, gait problem, joint swelling and myalgias  Skin: Negative for rash  Neurological: Positive for headaches (As discussed above)  Negative for dizziness, weakness, light-headedness and numbness  Hematological: Negative for adenopathy  Does not bruise/bleed easily  Psychiatric/Behavioral: Negative for confusion, dysphoric mood and sleep disturbance  Objective:      /72 (BP Location: Left arm, Patient Position: Sitting, Cuff Size: Adult)   Pulse 84   Temp (!) 97 4 °F (36 3 °C)   Resp 18   Ht 5' 3 5" (1 613 m)   Wt 53 kg (116 lb 12 8 oz)   SpO2 99%   BMI 20 37 kg/m²          Physical Exam  Constitutional:       General: She is not in acute distress  Appearance: She is well-developed  HENT:      Head: Normocephalic  Mouth/Throat:      Pharynx: No oropharyngeal exudate  Eyes:      General: No scleral icterus  Pupils: Pupils are equal, round, and reactive to light  Neck:      Thyroid: No thyromegaly  Comments: Increased trapezius muscle spasm right more than the left  Cardiovascular:      Rate and Rhythm: Normal rate and regular rhythm  Heart sounds: Normal heart sounds  No murmur  Pulmonary:      Effort: Pulmonary effort is normal  No respiratory distress  Breath sounds: Normal breath sounds  No wheezing or rales     Abdominal:      General: Bowel sounds are normal  There is no distension  Palpations: Abdomen is soft  There is no mass  Tenderness: There is no abdominal tenderness  There is no guarding or rebound  Hernia: No hernia is present  Lymphadenopathy:      Cervical: No cervical adenopathy  Skin:     General: Skin is warm  Neurological:      General: No focal deficit present  Mental Status: She is alert and oriented to person, place, and time  Cranial Nerves: No cranial nerve deficit  Deep Tendon Reflexes: Reflexes are normal and symmetric  Comments: Mild tremor Left hand   Psychiatric:         Behavior: Behavior normal          Thought Content:  Thought content normal          Judgment: Judgment normal

## 2021-03-08 ENCOUNTER — HOSPITAL ENCOUNTER (OUTPATIENT)
Dept: CT IMAGING | Facility: HOSPITAL | Age: 51
Discharge: HOME | End: 2021-03-08
Payer: COMMERCIAL

## 2021-03-08 DIAGNOSIS — R51.9 THROBBING HEADACHE: ICD-10-CM

## 2021-03-08 PROCEDURE — 70450 CT HEAD/BRAIN W/O DYE: CPT

## 2021-03-08 PROCEDURE — G1004 CDSM NDSC: HCPCS

## 2021-04-01 DIAGNOSIS — Z23 ENCOUNTER FOR IMMUNIZATION: ICD-10-CM

## 2021-05-13 ENCOUNTER — HOSPITAL ENCOUNTER (OUTPATIENT)
Dept: MAMMOGRAPHY | Facility: MEDICAL CENTER | Age: 51
Discharge: HOME | End: 2021-05-13
Payer: COMMERCIAL

## 2021-05-13 VITALS — BODY MASS INDEX: 20.55 KG/M2 | HEIGHT: 63 IN | WEIGHT: 116 LBS

## 2021-05-13 DIAGNOSIS — Z12.39 BREAST CANCER SCREENING, HIGH RISK PATIENT: ICD-10-CM

## 2021-05-13 PROCEDURE — 77067 SCR MAMMO BI INCL CAD: CPT

## 2021-05-13 PROCEDURE — 77063 BREAST TOMOSYNTHESIS BI: CPT

## 2021-05-20 ENCOUNTER — OFFICE VISIT (OUTPATIENT)
Dept: SURGICAL ONCOLOGY | Facility: CLINIC | Age: 51
End: 2021-05-20
Payer: COMMERCIAL

## 2021-05-20 VITALS
HEART RATE: 80 BPM | WEIGHT: 113 LBS | DIASTOLIC BLOOD PRESSURE: 82 MMHG | TEMPERATURE: 97.8 F | HEIGHT: 63 IN | BODY MASS INDEX: 20.02 KG/M2 | SYSTOLIC BLOOD PRESSURE: 116 MMHG

## 2021-05-20 DIAGNOSIS — Z08 ENCOUNTER FOR FOLLOW-UP SURVEILLANCE OF BREAST CANCER: Primary | ICD-10-CM

## 2021-05-20 DIAGNOSIS — Z85.3 ENCOUNTER FOR FOLLOW-UP SURVEILLANCE OF BREAST CANCER: Primary | ICD-10-CM

## 2021-05-20 DIAGNOSIS — R92.2 DENSE BREAST TISSUE: ICD-10-CM

## 2021-05-20 DIAGNOSIS — Z85.3 PERSONAL HISTORY OF ADENOCARCINOMA OF BREAST: ICD-10-CM

## 2021-05-20 DIAGNOSIS — Z12.39 BREAST CANCER SCREENING, HIGH RISK PATIENT: ICD-10-CM

## 2021-05-20 PROCEDURE — 99213 OFFICE O/P EST LOW 20 MIN: CPT | Performed by: SURGERY

## 2021-05-20 PROCEDURE — 1036F TOBACCO NON-USER: CPT | Performed by: SURGERY

## 2021-05-20 NOTE — PROGRESS NOTES
Surgical Oncology Follow Up       [facility] SURGICAL ONCOLOGY [facility] [address]    Deadra Frames  1970  [id number]  [address]  [facility] SURGICAL ONCOLOGY [place]  [place] [address]    Chief Complaint   Patient presents with    Follow-up       Assessment/Plan   Diagnoses and all orders for this visit:    Encounter for follow-up surveillance of breast cancer    Breast cancer screening, high risk patient  -     Mammo screening bilateral w 3d & cad; Future    Dense breast tissue    Personal history of adenocarcinoma of breast        Advance Care Planning/Advance Directives:  Discussed disease status, cancer treatment plans and/or cancer treatment goals with the patient  Oncology History:    Oncology History Overview Note   9/2018 - observation with 81 mg ASA daily     Personal history of adenocarcinoma of breast   4/2009 Initial Diagnosis    Invasive ductal carcinoma of breast, female, right ([facility] [address] )     4/2009 - 5/2009 Cancer Staged     T1 B  N0, ER/MO positive, HER-2 negative, infiltrating ductal carcinoma  Stage IA, right     5/11/2009 Surgery    Right partial mastectomy, SLNB, breast implant  Dr Larry Rosales     5/2009 Genetic Testing    BRCA negative     2009 - 2009 Radiation    WBRT  Dr Tyesha Canales     6/2009 - 6/2014 Hormone Therapy    Tamoxifen     Essential thrombocytosis ([facility] [address] )   6/13/2018 Initial Diagnosis    In the past, Plt count had increased and return to normal   Pt was screened for ET with Jorge mutation and was negative  Then in June 2018, platelet count elevated again and mutation testing was completed  8/23/2018 Genomic Testing    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  BCR-aBL was negative via FISH            History of Present Illness:  Breast cancer follow-up, completed her tamoxifen, no concerns  -Interval History: recent mammogram    Review of Systems:  Review of Systems Constitutional: Negative  Negative for appetite change and fever  Eyes: Negative  Respiratory: Negative for shortness of breath  Cardiovascular: Negative  Gastrointestinal: Negative  Endocrine: Negative  Genitourinary: Negative  Musculoskeletal: Negative  Negative for arthralgias and myalgias  Skin: Negative  Allergic/Immunologic: Negative  Neurological: Negative  Hematological: Negative  Negative for adenopathy  Does not bruise/bleed easily  Psychiatric/Behavioral: Negative          Patient Active Problem List   Diagnosis    Personal history of adenocarcinoma of breast    Breast cancer screening, high risk patient    Essential thrombocytosis ([facility] [address] )    Encounter for follow-up surveillance of breast cancer    Dense breast tissue     Past Medical History:   Diagnosis Date    Anxiety     Bacterial vaginosis     History of infection due to human papilloma virus (HPV)     last assessed - [id number]    HPV (human papilloma virus) infection     Hx of radiation therapy     BALDEMAR-2 gene mutation     Moderate dysplasia of cervix (EMILI II)     last assessed - [id number]    Ovarian cyst     last assessed - [id number]    Pap smear abnormality of cervix with ASCUS favoring dysplasia     last assessed - [id number]    Secondary amenorrhea     Thrombocytosis ([facility] [address]     Use of tamoxifen (Nolvadex)     last assessed - [id number]    Varicella      Past Surgical History:   Procedure Laterality Date    BREAST LUMPECTOMY Right     last assessed - [id number]    BREAST LUMPECTOMY Right 05/11/2009    BREAST LUMPECTOMY Right 06/05/2009    BREAST RECONSTRUCTION Bilateral 10/2018    bilateral implants    BREAST RECONSTRUCTION Right 02/2019    right implant    CERVICAL BIOPSY  W/ LOOP ELECTRODE EXCISION      COLPOSCOPY W/ BIOPSY / CURETTAGE      Colposcopy Cervix with Biopsy(s) with Endocervical Currettage    MASTECTOMY, PARTIAL Right 05/11/2009    right partial mastectomy re-excision 6/26/09  SENTINEL LYMPH NODE BIOPSY Right      Family History   Problem Relation Age of Onset    No Known Problems Mother     No Known Problems Father     Ovarian cancer Paternal Grandmother         age unknown    Colon polyps Cousin     Colon cancer Cousin 48    No Known Problems Sister     No Known Problems Maternal Grandmother     No Known Problems Maternal Grandfather     No Known Problems Paternal Grandfather     No Known Problems Half-Sister     No Known Problems Maternal Aunt     Cancer Paternal Aunt 61    No Known Problems Paternal Aunt     No Known Problems Paternal Aunt      Social History     Socioeconomic History    Marital status: Single     Spouse name: Not on file    Number of children: Not on file    Years of education: Not on file    Highest education level: Not on file   Occupational History    Not on file   Social Needs    Financial resource strain: Not on file    Food insecurity     Worry: Not on file     Inability: Not on file    Transportation needs     Medical: Not on file     Non-medical: Not on file   Tobacco Use    Smoking status: Former Smoker    Smokeless tobacco: Never Used   Substance and Sexual Activity    Alcohol use: Yes     Frequency: Monthly or less     Comment: Social drinker    Drug use: No    Sexual activity: Yes     Partners: Male   Lifestyle    Physical activity     Days per week: Not on file     Minutes per session: Not on file    Stress: Not on file   Relationships    Social connections     Talks on phone: Not on file     Gets together: Not on file     Attends Mosque service: Not on file     Active member of club or organization: Not on file     Attends meetings of clubs or organizations: Not on file     Relationship status: Not on file    Intimate partner violence     Fear of current or ex partner: Not on file     Emotionally abused: Not on file     Physically abused: Not on file     Forced sexual activity: Not on file   Other Topics Concern    Not on file   Social History Narrative    Caffeine use    Marital History -     Catholic Affiliation - Rastafarian    Uses safety equipment - Seat belts       Current Outpatient Medications:     aspirin (ECOTRIN LOW STRENGTH) 81 mg EC tablet, Take 81 mg by mouth daily, Disp: , Rfl:     intrauterine copper (PARAGARD) IUD, 1 each by Intrauterine route once, Disp: , Rfl:     LORazepam (ATIVAN) 0 5 mg tablet, Take 1 tablet (0 5 mg total) by mouth daily as needed for anxiety, Disp: 20 tablet, Rfl: 0    Multiple Vitamin (MULTIVITAMIN) capsule, Take by mouth, Disp: , Rfl:   Allergies   Allergen Reactions    Cephalexin Rash       The following portions of the patient's history were reviewed and updated as appropriate: allergies, current medications, past family history, past medical history, past social history, past surgical history and problem list         Vitals:    05/20/21 1546   BP: 116/82   Pulse: 80   Temp: 97 8 °F (36 6 °C)       Physical Exam  Constitutional:       General: She is not in acute distress  Appearance: Normal appearance  She is well-developed  HENT:      Head: Normocephalic and atraumatic  Cardiovascular:      Heart sounds: Normal heart sounds  Pulmonary:      Breath sounds: Normal breath sounds  Chest:      Breasts:         Right: Skin change (Lumpectomy scar) present  No inverted nipple, mass, nipple discharge or tenderness  Left: No inverted nipple, mass, nipple discharge, skin change or tenderness  Abdominal:      Palpations: Abdomen is soft  Lymphadenopathy:      Upper Body:      Right upper body: No supraclavicular, axillary or pectoral adenopathy  Left upper body: No supraclavicular, axillary or pectoral adenopathy  Neurological:      Mental Status: She is alert and oriented to person, place, and time     Psychiatric:         Mood and Affect: Mood normal            Results:  Labs:      Imaging   05/13/2021 bilateral 3D screening mammogram is benign BI-RADS two with a density of three    I reviewed the above imaging data  Discussion/Summary: 81-year-old female status post right breast conservation for stage I invasive ductal carcinoma  She did have radiation therapy and took tamoxifen  There is no evidence of disease based on examination today  Her recent bilateral mammogram was benign  She continues to have dense breast tissue  I will plan to see her on an annual basis or sooner should the need arise

## 2021-06-17 ENCOUNTER — ANNUAL EXAM (OUTPATIENT)
Dept: OBGYN CLINIC | Facility: CLINIC | Age: 51
End: 2021-06-17
Payer: COMMERCIAL

## 2021-06-17 VITALS
WEIGHT: 115 LBS | HEIGHT: 64 IN | DIASTOLIC BLOOD PRESSURE: 72 MMHG | SYSTOLIC BLOOD PRESSURE: 128 MMHG | BODY MASS INDEX: 19.63 KG/M2

## 2021-06-17 DIAGNOSIS — Z12.4 CERVICAL CANCER SCREENING: Primary | ICD-10-CM

## 2021-06-17 DIAGNOSIS — Z01.419 ENCOUNTER FOR ANNUAL ROUTINE GYNECOLOGICAL EXAMINATION: ICD-10-CM

## 2021-06-17 DIAGNOSIS — Z11.51 SCREENING FOR HPV (HUMAN PAPILLOMAVIRUS): ICD-10-CM

## 2021-06-17 PROCEDURE — 87624 HPV HI-RISK TYP POOLED RSLT: CPT | Performed by: OBSTETRICS & GYNECOLOGY

## 2021-06-17 PROCEDURE — G0145 SCR C/V CYTO,THINLAYER,RESCR: HCPCS | Performed by: OBSTETRICS & GYNECOLOGY

## 2021-06-17 PROCEDURE — 3008F BODY MASS INDEX DOCD: CPT | Performed by: SURGERY

## 2021-06-17 PROCEDURE — S0612 ANNUAL GYNECOLOGICAL EXAMINA: HCPCS | Performed by: OBSTETRICS & GYNECOLOGY

## 2021-06-17 NOTE — PROGRESS NOTES
Assessment/Plan:      Pap and HPV done - will await results  normal 3D mammogram in May, these are ordered by Dr Cuevas  She sees her once a year  Discussed self breast exams    colon cancer screening  -She had genetic test and is planning to have a colonoscopy next year when she is 50      contraception- Paraguard is working well for her, it does not need to be removed until 2027  Perimenopause- she will keep track of any bleeding that she has  discussed preventive care, regular exercise and a healthy diet      No problem-specific Assessment & Plan notes found for this encounter  Diagnoses and all orders for this visit:    Cervical cancer screening  -     Liquid-based pap, screening    Screening for HPV (human papillomavirus)  -     Liquid-based pap, screening          Subjective:      Patient ID: Ezekiel Hung is a 54 y o  female  Patient here for yearly  Her LMP was September 2020, it was very light  She has been skipping cycles for the past few years  Occasional hot flashes  History of right breast cancer  She sees Dr Cuevas once a year  Normal Pap and negative HPV in December 2020  She had been having persistent EMILI 1 and also had a Pap that showed atypical glandular cells but her EMB was normal   There was a suggestion polyp  She has not been bleeding as mentioned above  Normal  3D mammogram last month with dense tissue  The following portions of the patient's history were reviewed and updated as appropriate: allergies, current medications, past family history, past medical history, past social history, past surgical history and problem list     Review of Systems   Constitutional: Negative  Gastrointestinal: Negative  Genitourinary: Negative  Objective:      /72 (BP Location: Left arm, Patient Position: Sitting, Cuff Size: Standard)   Ht 5' 3 5" (1 613 m)   Wt 52 2 kg (115 lb)   BMI 20 05 kg/m²          Physical Exam  Vitals reviewed  Constitutional:       Appearance: She is well-developed  Neck:      Thyroid: No thyromegaly  Trachea: No tracheal deviation  Cardiovascular:      Rate and Rhythm: Normal rate and regular rhythm  Pulmonary:      Effort: Pulmonary effort is normal       Breath sounds: Normal breath sounds  Chest:      Breasts: Breasts are symmetrical          Right: No inverted nipple, mass, nipple discharge, skin change or tenderness  Left: No inverted nipple, mass, nipple discharge, skin change or tenderness  Abdominal:      General: There is no distension  Palpations: Abdomen is soft  There is no mass  Tenderness: There is no abdominal tenderness  Genitourinary:     Labia:         Right: No rash, tenderness, lesion or injury  Left: No rash, tenderness, lesion or injury  Vagina: Normal       Cervix: No cervical motion tenderness, discharge or friability  Adnexa:         Right: No mass, tenderness or fullness  Left: No mass, tenderness or fullness          Rectum: Normal       Comments:   IUD strings are visible

## 2021-06-23 LAB
LAB AP GYN PRIMARY INTERPRETATION: NORMAL
LAB AP LMP: NORMAL
Lab: NORMAL

## 2022-01-31 ENCOUNTER — TELEPHONE (OUTPATIENT)
Dept: HEMATOLOGY ONCOLOGY | Facility: CLINIC | Age: 52
End: 2022-01-31

## 2022-01-31 NOTE — TELEPHONE ENCOUNTER
Patient called to request lab order be faxed to [facility] labs at  Fax # [phone]   Order faxed to [facility]

## 2022-02-03 ENCOUNTER — OFFICE VISIT (OUTPATIENT)
Dept: HEMATOLOGY ONCOLOGY | Facility: CLINIC | Age: 52
End: 2022-02-03
Payer: COMMERCIAL

## 2022-02-03 VITALS
OXYGEN SATURATION: 99 % | HEIGHT: 64 IN | WEIGHT: 117.5 LBS | RESPIRATION RATE: 16 BRPM | HEART RATE: 67 BPM | TEMPERATURE: 98.1 F | SYSTOLIC BLOOD PRESSURE: 120 MMHG | DIASTOLIC BLOOD PRESSURE: 68 MMHG | BODY MASS INDEX: 20.06 KG/M2

## 2022-02-03 DIAGNOSIS — Z17.0 MALIGNANT NEOPLASM OF OVERLAPPING SITES OF BREAST IN FEMALE, ESTROGEN RECEPTOR POSITIVE, UNSPECIFIED LATERALITY (HCC): ICD-10-CM

## 2022-02-03 DIAGNOSIS — C50.819 MALIGNANT NEOPLASM OF OVERLAPPING SITES OF BREAST IN FEMALE, ESTROGEN RECEPTOR POSITIVE, UNSPECIFIED LATERALITY (HCC): ICD-10-CM

## 2022-02-03 DIAGNOSIS — D47.3 ESSENTIAL THROMBOCYTOSIS (HCC): Primary | ICD-10-CM

## 2022-02-03 PROBLEM — D70.8 OTHER NEUTROPENIA (HCC): Status: ACTIVE | Noted: 2022-02-03

## 2022-02-03 PROCEDURE — 99214 OFFICE O/P EST MOD 30 MIN: CPT | Performed by: INTERNAL MEDICINE

## 2022-02-03 NOTE — PROGRESS NOTES
[facility] HEMATOLOGY ONCOLOGY SPECIALISTS [facility]  [address] [address]  [phone]    Mary Joshi,1970, [id number]  02/03/22    Discussion:   In summary, this is a 82-year-old female history of JAK2 mutation essential thrombocytosis  Platelets are recently 500, stable  White count 3 8, slight neutrophil depression  Etiology unclear, suspect benign cause  Observation  Hemoglobin is normal   She also has a history of early stage breast cancer  She is up-to-date with mammography  Yearly follow-up  I discussed the above with the patient  The patient  voiced understanding and agreement   ______________________________________________________________________    Chief Complaint   Patient presents with    Follow-up       HPI:  Oncology History Overview Note   9/2018 - observation with 81 mg ASA daily     Personal history of adenocarcinoma of breast   4/2009 Initial Diagnosis    Invasive ductal carcinoma of breast, female, right ([facility] [address] )     4/2009 - 5/2009 Cancer Staged     T1 B  N0, ER/DC positive, HER-2 negative, infiltrating ductal carcinoma  Stage IA, right     5/11/2009 Surgery    Right partial mastectomy, SLNB, breast implant  Dr Diane Almeida     5/2009 Genetic Testing    BRCA negative     2009 - 2009 Radiation    WBRT  Dr Bettie Navarrete     6/2009 - 6/2014 Hormone Therapy    Tamoxifen     Essential thrombocytosis ([facility] [address] )   6/13/2018 Initial Diagnosis    In the past, Plt count had increased and return to normal   Pt was screened for ET with Jorge mutation and was negative  Then in June 2018, platelet count elevated again and mutation testing was completed  8/23/2018 Genomic Testing    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  BCR-aBL was negative via FISH  Interval History:  Clinically stable  ECOG-  0 - Asymptomatic    Review of Systems   Constitutional: Negative for appetite change, diaphoresis, fatigue and fever  HENT: Negative for sinus pain  Eyes: Negative for discharge  Respiratory: Negative for cough and shortness of breath  Cardiovascular: Negative for chest pain  Gastrointestinal: Negative for abdominal pain, constipation and diarrhea  Endocrine: Negative for cold intolerance  Genitourinary: Negative for difficulty urinating and hematuria  Musculoskeletal: Negative for joint swelling  Skin: Negative for rash  Allergic/Immunologic: Negative for environmental allergies  Neurological: Negative for dizziness and headaches  Hematological: Negative for adenopathy  Psychiatric/Behavioral: Negative for agitation         Past Medical History:   Diagnosis Date    Anxiety     Bacterial vaginosis     History of infection due to human papilloma virus (HPV)     last assessed - [id number]    HPV (human papilloma virus) infection     Hx of radiation therapy     BALDEMAR-2 gene mutation     Moderate dysplasia of cervix (EMILI II)     last assessed - [id number]    Ovarian cyst     last assessed - 72HWT1803    Pap smear abnormality of cervix with ASCUS favoring dysplasia     last assessed - 87Urm5390    Secondary amenorrhea     Thrombocytosis     Use of tamoxifen (Nolvadex)     last assessed - [id number]    Varicella      Patient Active Problem List   Diagnosis    Personal history of adenocarcinoma of breast    Breast cancer screening, high risk patient    Essential thrombocytosis ([facility] [address] )    Encounter for follow-up surveillance of breast cancer    Dense breast tissue       Current Outpatient Medications:     aspirin (ECOTRIN LOW STRENGTH) 81 mg EC tablet, Take 81 mg by mouth daily, Disp: , Rfl:     intrauterine copper (PARAGARD) IUD, 1 each by Intrauterine route once, Disp: , Rfl:     LORazepam (ATIVAN) 0 5 mg tablet, Take 1 tablet (0 5 mg total) by mouth daily as needed for anxiety, Disp: 20 tablet, Rfl: 0    Multiple Vitamin (MULTIVITAMIN) capsule, Take by mouth, Disp: , Rfl:   Allergies   Allergen Reactions    Cephalexin Rash Past Surgical History:   Procedure Laterality Date    BREAST LUMPECTOMY Right     last assessed - [id number]    BREAST LUMPECTOMY Right 05/11/2009    BREAST LUMPECTOMY Right 06/05/2009    BREAST RECONSTRUCTION Bilateral 10/2018    bilateral implants    BREAST RECONSTRUCTION Right 02/2019    right implant    CERVICAL BIOPSY  W/ LOOP ELECTRODE EXCISION      COLPOSCOPY W/ BIOPSY / CURETTAGE      Colposcopy Cervix with Biopsy(s) with Endocervical Currettage    MASTECTOMY, PARTIAL Right 05/11/2009    right partial mastectomy re-excision 6/26/09    SENTINEL LYMPH NODE BIOPSY Right      Social History     Objective:  Vitals:    02/03/22 1057   BP: 120/68   BP Location: Left arm   Patient Position: Sitting   Cuff Size: Adult   Pulse: 67   Resp: 16   Temp: 98 1 °F (36 7 °C)   TempSrc: Temporal   SpO2: 99%   Weight: 53 3 kg (117 lb 8 oz)   Height: 5' 3 5" (1 613 m)     Physical Exam  Constitutional:       Appearance: She is well-developed  HENT:      Head: Normocephalic and atraumatic  Eyes:      Pupils: Pupils are equal, round, and reactive to light  Cardiovascular:      Rate and Rhythm: Normal rate  Heart sounds: No murmur heard  Pulmonary:      Effort: No respiratory distress  Breath sounds: No wheezing or rales  Abdominal:      General: There is no distension  Palpations: Abdomen is soft  Tenderness: There is no abdominal tenderness  There is no rebound  Musculoskeletal:         General: No tenderness  Cervical back: Neck supple  Lymphadenopathy:      Cervical: No cervical adenopathy  Skin:     General: Skin is warm  Findings: No rash  Neurological:      Mental Status: She is alert and oriented to person, place, and time  Deep Tendon Reflexes: Reflexes normal    Psychiatric:         Thought Content: Thought content normal            Labs: I personally reviewed the labs and imaging pertinent to this patient care

## 2022-02-18 ENCOUNTER — RA CDI HCC (OUTPATIENT)
Dept: OTHER | Facility: HOSPITAL | Age: 52
End: 2022-02-18

## 2022-02-18 NOTE — PROGRESS NOTES
[facility]  coding opportunities          Number of diagnosis code(s) already on the problem list added to FYI fla               Number of suggestions used: 0      Number of suggestions NOT actually used: 1     Patients insurance company: [employer] (Medicare Advantage and Commercial)     Visit status: Patient arrived for their scheduled appointment        [place] [address]  coding opportunities          Number of diagnosis code(s) already on the problem list added to [employer] fla                     Patients insurance company: [employer] [address] [place] [address] ([place] and [employer]           With the beginning of the new year, this is a reminder to address ALL HCC (risk adjustment) codes for  as patient scores reset to zero KAMERON   D70 8 Other neutropenia ([facility] [address] )

## 2022-02-25 ENCOUNTER — OFFICE VISIT (OUTPATIENT)
Dept: FAMILY MEDICINE CLINIC | Facility: CLINIC | Age: 52
End: 2022-02-25
Payer: COMMERCIAL

## 2022-02-25 VITALS
HEIGHT: 64 IN | WEIGHT: 118.4 LBS | SYSTOLIC BLOOD PRESSURE: 130 MMHG | OXYGEN SATURATION: 99 % | HEART RATE: 71 BPM | TEMPERATURE: 97.2 F | BODY MASS INDEX: 20.22 KG/M2 | DIASTOLIC BLOOD PRESSURE: 80 MMHG

## 2022-02-25 DIAGNOSIS — Z00.00 ANNUAL PHYSICAL EXAM: Primary | ICD-10-CM

## 2022-02-25 DIAGNOSIS — F43.0 ANXIETY IN ACUTE STRESS REACTION: ICD-10-CM

## 2022-02-25 DIAGNOSIS — Z12.11 COLON CANCER SCREENING: ICD-10-CM

## 2022-02-25 DIAGNOSIS — F41.1 ANXIETY IN ACUTE STRESS REACTION: ICD-10-CM

## 2022-02-25 DIAGNOSIS — Z82.49 FAMILY HISTORY OF BRAIN ANEURYSM: ICD-10-CM

## 2022-02-25 PROCEDURE — 1036F TOBACCO NON-USER: CPT | Performed by: INTERNAL MEDICINE

## 2022-02-25 PROCEDURE — 99396 PREV VISIT EST AGE 40-64: CPT | Performed by: INTERNAL MEDICINE

## 2022-02-25 PROCEDURE — 3725F SCREEN DEPRESSION PERFORMED: CPT | Performed by: INTERNAL MEDICINE

## 2022-02-25 PROCEDURE — 3008F BODY MASS INDEX DOCD: CPT | Performed by: INTERNAL MEDICINE

## 2022-02-25 RX ORDER — LORAZEPAM 0.5 MG/1
0.5 TABLET ORAL DAILY PRN
Qty: 20 TABLET | Refills: 0 | Status: SHIPPED | OUTPATIENT
Start: 2022-02-25

## 2022-02-25 NOTE — PATIENT INSTRUCTIONS

## 2022-02-25 NOTE — PROGRESS NOTES
[facility] PRIMARY CARE [facility]    NAME: Jessica Reynolds  AGE: 50 y o  SEX: female  : 1970     DATE: 2022     Assessment and Plan:     Problem List Items Addressed This Visit     None      Visit Diagnoses     Annual physical exam    -  Primary      P r n  [employer] for travel  If all is well will see her back in a year  Immunizations and preventive care screenings were discussed with patient today  Appropriate education was printed on patient's after visit summary  Counseling:  · Colon cancer screening         No follow-ups on file  Chief Complaint:     No chief complaint on file  History of Present Illness:     Adult Annual Physical   Patient here for a comprehensive physical exam  The patient reports no problems  Recent lab studies were discussed with patient cholesterol slightly elevated glucose is good  Platelet counts have improved to 503  Patient takes aspirin a day  She shares with me family history of brain aneurysm in her paternal aunt  Nobody else is known to have aneurysm however she is not sure if anybody else has been screen  Would order MRA  She is also due for colonoscopy  Diet and Physical Activity  · Diet/Nutrition: well balanced diet  · Exercise: walking  Depression Screening  PHQ-2/9 Depression Screening         General Health  · Sleep: sleeps well  · Hearing: normal - bilateral   · Vision: no vision problems  · Dental: regular dental visits  /GYN Health  · Patient is: perimenopausal  · Last menstrual period:  Contraceptive  · Contraceptive method: IUD placement  Review of Systems:     Review of Systems   Psychiatric/Behavioral: Negative for dysphoric mood        Past Medical History:     Past Medical History:   Diagnosis Date    Anxiety     Bacterial vaginosis     History of infection due to human papilloma virus (HPV)     last assessed - [id number]    HPV (human papilloma virus) infection     Hx of radiation therapy     BALDEMAR-2 gene mutation     Moderate dysplasia of cervix (EMILI II)     last assessed - 40Bay1095    Ovarian cyst     last assessed - 50ZXX1887    Pap smear abnormality of cervix with ASCUS favoring dysplasia     last assessed - 33Xhg8849    Secondary amenorrhea     Thrombocytosis     Use of tamoxifen (Nolvadex)     last assessed - 07MJM5182    Varicella       Past Surgical History:     Past Surgical History:   Procedure Laterality Date    BREAST LUMPECTOMY Right     last assessed - 44PML1496    BREAST LUMPECTOMY Right 05/11/2009    BREAST LUMPECTOMY Right 06/05/2009    BREAST RECONSTRUCTION Bilateral 10/2018    bilateral implants    BREAST RECONSTRUCTION Right 02/2019    right implant    CERVICAL BIOPSY  W/ LOOP ELECTRODE EXCISION      COLPOSCOPY W/ BIOPSY / CURETTAGE      Colposcopy Cervix with Biopsy(s) with Endocervical Currettage    MASTECTOMY, PARTIAL Right 05/11/2009    right partial mastectomy re-excision 6/26/09    SENTINEL LYMPH NODE BIOPSY Right       Social History:     Social History     Socioeconomic History    Marital status: Single     Spouse name: Not on file    Number of children: Not on file    Years of education: Not on file    Highest education level: Not on file   Occupational History    Not on file   Tobacco Use    Smoking status: Former Smoker     Types: Cigarettes     Quit date: 2012     Years since quitting: 10 1    Smokeless tobacco: Never Used    Tobacco comment: former social smoker   Vaping Use    Vaping Use: Never used   Substance and Sexual Activity    Alcohol use: Yes     Comment: Social drinker    Drug use: No    Sexual activity: Yes     Partners: Male     Birth control/protection: Implant   Other Topics Concern    Not on file   Social History Narrative    Caffeine use    Marital History -     Catholic Affiliation - Deny Yara    Uses safety equipment - Seat belts     Social Determinants of Health     Financial Resource Strain: Not on file   Food Insecurity: Not on file   Transportation Needs: Not on file   Physical Activity: Not on file   Stress: Not on file   Social Connections: Not on file   Intimate Partner Violence: Not on file   Housing Stability: Not on file      Family History:     Family History   Problem Relation Age of Onset    No Known Problems Mother     No Known Problems Father     Ovarian cancer Paternal Grandmother         age unknown    Colon polyps Cousin     Colon cancer Cousin 48    No Known Problems Sister     No Known Problems Maternal Grandmother     No Known Problems Maternal Grandfather     No Known Problems Paternal Grandfather     No Known Problems Half-Sister     No Known Problems Maternal Aunt     Cancer Paternal Aunt 61    No Known Problems Paternal Aunt     No Known Problems Paternal Aunt       Current Medications:     Current Outpatient Medications   Medication Sig Dispense Refill    aspirin (ECOTRIN LOW STRENGTH) 81 mg EC tablet Take 81 mg by mouth daily      intrauterine copper (PARAGARD) IUD 1 each by Intrauterine route once      LORazepam (ATIVAN) 0 5 mg tablet Take 1 tablet (0 5 mg total) by mouth daily as needed for anxiety 20 tablet 0    Multiple Vitamin (MULTIVITAMIN) capsule Take by mouth       No current facility-administered medications for this visit  Allergies: Allergies   Allergen Reactions    Cephalexin Rash      Physical Exam:     There were no vitals taken for this visit  Physical Exam  Vitals and nursing note reviewed  Constitutional:       General: She is not in acute distress  Appearance: She is well-developed  HENT:      Head: Normocephalic and atraumatic  Eyes:      Conjunctiva/sclera: Conjunctivae normal    Neck:      Vascular: No carotid bruit  Cardiovascular:      Rate and Rhythm: Normal rate and regular rhythm  Heart sounds: No murmur heard        Pulmonary:      Effort: Pulmonary effort is normal  No respiratory distress  Breath sounds: Normal breath sounds  Abdominal:      General: Bowel sounds are normal  There is no distension  Palpations: Abdomen is soft  Tenderness: There is no abdominal tenderness  There is no right CVA tenderness, left CVA tenderness or guarding  Musculoskeletal:      Cervical back: Neck supple  Right lower leg: No edema  Left lower leg: No edema  Skin:     General: Skin is warm and dry  Neurological:      Mental Status: She is alert and oriented to person, place, and time     Psychiatric:         Mood and Affect: Mood normal          Behavior: Behavior normal           Chucky Davila MD  [address]

## 2022-05-12 ENCOUNTER — TELEPHONE (OUTPATIENT)
Dept: GASTROENTEROLOGY | Facility: CLINIC | Age: 52
End: 2022-05-12

## 2022-05-12 NOTE — TELEPHONE ENCOUNTER
Scheduled date of colonoscopy (as of today):08 30 22  Physician performing colonoscopy:DR CHERRY  Location of colonoscopy[facility]  Bowel prep reviewed with patient:DULCOLAX/MIRALAX  Instructions reviewed with patient by:PAGE VERBALLY/MAILED  Clearances: N/A    05/12/22  Screened by: Ricardo Fagan    Referring Provider MIKE NEUMANN    Pre- Screening: Body mass index is 20 64 kg/m²  Has patient been referred for a routine screening Colonoscopy? yes  Is the patient between 39-70 years old? yes      Previous Colonoscopy no   If yes:    Date:     Facility:     Reason:       SCHEDULING STAFF: If the patient is between 45yrs-49yrs, please advise patient to confirm benefits/coverage with their insurance company for a routine screening colonoscopy, some insurance carriers will only cover at [address] or older  If the patient is over 66years old, please schedule an office visit  Does the patient want to see a Gastroenterologist prior to their procedure OR are they having any GI symptoms? no    Has the patient been hospitalized or had abdominal surgery in the past 6 months? no    Does the patient use supplemental oxygen? no    Does the patient take Coumadin, Lovenox, Plavix, Elliquis, Xarelto, or other blood thinning medication? no    Has the patient had a stroke, cardiac event, or stent placed in the past year? no    SCHEDULING STAFF: If patient answers NO to above questions, then schedule procedure  If patient answers YES to above questions, then schedule office appointment  If patient is between 45yrs - 49yrs, please advise patient that we will have to confirm benefits & coverage with their insurance company for a routine screening colonoscopy

## 2022-05-16 ENCOUNTER — HOSPITAL ENCOUNTER (OUTPATIENT)
Dept: MAMMOGRAPHY | Facility: MEDICAL CENTER | Age: 52
Discharge: HOME | End: 2022-05-16
Payer: COMMERCIAL

## 2022-05-16 VITALS — WEIGHT: 118 LBS | BODY MASS INDEX: 20.14 KG/M2 | HEIGHT: 64 IN

## 2022-05-16 DIAGNOSIS — Z12.39 BREAST CANCER SCREENING, HIGH RISK PATIENT: ICD-10-CM

## 2022-05-16 PROCEDURE — 77063 BREAST TOMOSYNTHESIS BI: CPT

## 2022-05-16 PROCEDURE — 77067 SCR MAMMO BI INCL CAD: CPT

## 2022-05-23 ENCOUNTER — OFFICE VISIT (OUTPATIENT)
Dept: SURGICAL ONCOLOGY | Facility: CLINIC | Age: 52
End: 2022-05-23
Payer: COMMERCIAL

## 2022-05-23 VITALS
BODY MASS INDEX: 20.14 KG/M2 | TEMPERATURE: 97.4 F | HEIGHT: 64 IN | HEART RATE: 68 BPM | DIASTOLIC BLOOD PRESSURE: 70 MMHG | SYSTOLIC BLOOD PRESSURE: 118 MMHG | RESPIRATION RATE: 17 BRPM | WEIGHT: 118 LBS

## 2022-05-23 DIAGNOSIS — Z85.3 ENCOUNTER FOR FOLLOW-UP SURVEILLANCE OF BREAST CANCER: Primary | ICD-10-CM

## 2022-05-23 DIAGNOSIS — Z12.39 BREAST CANCER SCREENING, HIGH RISK PATIENT: ICD-10-CM

## 2022-05-23 DIAGNOSIS — Z85.3 PERSONAL HISTORY OF ADENOCARCINOMA OF BREAST: ICD-10-CM

## 2022-05-23 DIAGNOSIS — R92.2 DENSE BREAST TISSUE: ICD-10-CM

## 2022-05-23 DIAGNOSIS — Z08 ENCOUNTER FOR FOLLOW-UP SURVEILLANCE OF BREAST CANCER: Primary | ICD-10-CM

## 2022-05-23 PROCEDURE — 1036F TOBACCO NON-USER: CPT | Performed by: SURGERY

## 2022-05-23 PROCEDURE — 3008F BODY MASS INDEX DOCD: CPT | Performed by: SURGERY

## 2022-05-23 PROCEDURE — 99213 OFFICE O/P EST LOW 20 MIN: CPT | Performed by: SURGERY

## 2022-05-23 RX ORDER — AZITHROMYCIN 250 MG/1
TABLET, FILM COATED ORAL
COMMUNITY
Start: 2022-03-25

## 2022-05-23 NOTE — PROGRESS NOTES
Surgical Oncology Follow Up       [facility] SURGICAL ONCOLOGY [facility]  [address] [place] [address]    Magdalena Brothers  1970  [id number]  [facility] SURGICAL ONCOLOGY [place]  [address] [address]    Chief Complaint   Patient presents with    Follow-up       Assessment/Plan   Diagnoses and all orders for this visit:    Encounter for follow-up surveillance of breast cancer    Personal history of adenocarcinoma of breast    Breast cancer screening, high risk patient  -     Mammo screening bilateral w 3d & cad; Future    Dense breast tissue    Other orders  -     azithromycin (ZITHROMAX) 250 mg tablet; TAKE 2 TABLETS BY MOUTH TODAY, THEN TAKE 1 TABLET DAILY FOR 4 DAYS        Advance Care Planning/Advance Directives:  Discussed disease status, cancer treatment plans and/or cancer treatment goals with the patient  Oncology History:    Oncology History Overview Note   9/2018 - observation with 81 mg ASA daily     Personal history of adenocarcinoma of breast   4/2009 Initial Diagnosis    Invasive ductal carcinoma of breast, female, right ([facility] [address] )     4/2009 - 5/2009 Cancer Staged     T1 B  N0, ER/TN positive, HER-2 negative, infiltrating ductal carcinoma  Stage IA, right     5/11/2009 Surgery    Right partial mastectomy, SLNB, breast implant  Dr Gianna Durand     5/2009 Genetic Testing    BRCA negative     2009 - 2009 Radiation    WBRT  Dr Jacky Eddy     6/2009 - 6/2014 Hormone Therapy    Tamoxifen     Essential thrombocytosis ([facility] [address] )   6/13/2018 Initial Diagnosis    In the past, Plt count had increased and return to normal   Pt was screened for ET with Jorge mutation and was negative  Then in June 2018, platelet count elevated again and mutation testing was completed  8/23/2018 Genomic Testing    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  BCR-aBL was negative via FISH            History of Present Illness:  Breast cancer follow-up, reports some discomfort in the right breast but no additional breast referable concerns, had recent headaches and had a negative CT scan, will be having an MRI as well, reports that her platelet count has decreased, continues on aspirin  -Interval History:  Recent mammogram    Review of Systems:  Review of Systems   Constitutional: Negative  Negative for appetite change and fever  Eyes: Negative  Respiratory: Negative for shortness of breath  Cardiovascular: Negative  Gastrointestinal: Negative  Endocrine: Negative  Genitourinary: Negative  Musculoskeletal: Negative  Negative for arthralgias and myalgias  Skin: Negative  Allergic/Immunologic: Negative  Neurological: Positive for headaches (negative CT)  Hematological: Negative  Negative for adenopathy  Does not bruise/bleed easily  Psychiatric/Behavioral: Negative          Patient Active Problem List   Diagnosis    Personal history of adenocarcinoma of breast    Breast cancer screening, high risk patient    Essential thrombocytosis ([facility] [address] )    Encounter for follow-up surveillance of breast cancer    Dense breast tissue    Other neutropenia ([facility] [address] )     Past Medical History:   Diagnosis Date    Anxiety     Bacterial vaginosis     History of infection due to human papilloma virus (HPV)     last assessed - [id number]    HPV (human papilloma virus) infection     Hx of radiation therapy     BALDEMAR-2 gene mutation     Moderate dysplasia of cervix (EMILI II)     last assessed - 70Zjk5476    Ovarian cyst     last assessed - 63ZQH5789    Pap smear abnormality of cervix with ASCUS favoring dysplasia     last assessed - 32HKC0548    Secondary amenorrhea     Thrombocytosis     Use of tamoxifen (Nolvadex)     last assessed - 59NNG9642    Varicella      Past Surgical History:   Procedure Laterality Date    BREAST LUMPECTOMY Right     last assessed - 97JSN5971    BREAST LUMPECTOMY Right 05/11/2009    BREAST LUMPECTOMY Right 06/05/2009    BREAST RECONSTRUCTION Bilateral 10/2018    bilateral implants    BREAST RECONSTRUCTION Right 02/2019    right implant    CERVICAL BIOPSY  W/ LOOP ELECTRODE EXCISION      COLPOSCOPY W/ BIOPSY / CURETTAGE      Colposcopy Cervix with Biopsy(s) with Endocervical Currettage    MASTECTOMY, PARTIAL Right 05/11/2009    right partial mastectomy re-excision 6/26/09    SENTINEL LYMPH NODE BIOPSY Right      Family History   Problem Relation Age of Onset    No Known Problems Mother     No Known Problems Father     No Known Problems Sister     No Known Problems Maternal Grandmother     No Known Problems Maternal Grandfather     Ovarian cancer Paternal Grandmother         age unknown    No Known Problems Paternal Grandfather     No Known Problems Maternal Aunt     Cancer Paternal Aunt 61    No Known Problems Paternal Aunt     No Known Problems Paternal Aunt     Colon polyps Cousin     Colon cancer Cousin 48    No Known Problems Half-Sister      Social History     Socioeconomic History    Marital status: Single     Spouse name: Not on file    Number of children: Not on file    Years of education: Not on file    Highest education level: Not on file   Occupational History    Not on file   Tobacco Use    Smoking status: Former Smoker     Types: Cigarettes     Quit date: 2012     Years since quitting: 10 3    Smokeless tobacco: Never Used    Tobacco comment: former social smoker   Vaping Use    Vaping Use: Never used   Substance and Sexual Activity    Alcohol use: Yes     Comment: Social drinker    Drug use: No    Sexual activity: Yes     Partners: Male     Birth control/protection: Implant   Other Topics Concern    Not on file   Social History Narrative    Caffeine use    Marital History -     Sikhism Affiliation - Deny Yara    Uses safety equipment - Seat belts     Social Determinants of Health     Financial Resource Strain: Not on file   Food Insecurity: Not on file Transportation Needs: Not on file   Physical Activity: Not on file   Stress: Not on file   Social Connections: Not on file   Intimate Partner Violence: Not on file   Housing Stability: Not on file       Current Outpatient Medications:     aspirin (ECOTRIN LOW STRENGTH) 81 mg EC tablet, Take 81 mg by mouth daily, Disp: , Rfl:     azithromycin (ZITHROMAX) 250 mg tablet, TAKE 2 TABLETS BY MOUTH TODAY, THEN TAKE 1 TABLET DAILY FOR 4 DAYS, Disp: , Rfl:     intrauterine copper (PARAGARD) IUD, 1 each by Intrauterine route once, Disp: , Rfl:     LORazepam (ATIVAN) 0 5 mg tablet, Take 1 tablet (0 5 mg total) by mouth daily as needed for anxiety, Disp: 20 tablet, Rfl: 0    Multiple Vitamin (MULTIVITAMIN) capsule, Take by mouth, Disp: , Rfl:   Allergies   Allergen Reactions    Cephalexin Rash       The following portions of the patient's history were reviewed and updated as appropriate: allergies, current medications, past family history, past medical history, past social history, past surgical history and problem list         Vitals:    05/23/22 1553   BP: 118/70   Pulse: 68   Resp: 17   Temp: (!) 97 4 °F (36 3 °C)       Physical Exam  Constitutional:       General: She is not in acute distress  Appearance: Normal appearance  She is well-developed  HENT:      Head: Normocephalic and atraumatic  Cardiovascular:      Heart sounds: Normal heart sounds  Pulmonary:      Breath sounds: Normal breath sounds  Chest:   Breasts:      Right: Skin change (Lumpectomy scar, radiation changes) present  No inverted nipple, mass, nipple discharge, tenderness, axillary adenopathy or supraclavicular adenopathy  Left: No inverted nipple, mass, nipple discharge, skin change, tenderness, axillary adenopathy or supraclavicular adenopathy  Abdominal:      Palpations: Abdomen is soft  Lymphadenopathy:      Upper Body:      Right upper body: No supraclavicular, axillary or pectoral adenopathy        Left upper body: No supraclavicular, axillary or pectoral adenopathy  Neurological:      Mental Status: She is alert and oriented to person, place, and time  Psychiatric:         Mood and Affect: Mood normal            Results:  Labs:      Imaging  05/16/2022 bilateral 3D screening mammogram is benign BI-RADS two with a density of three    I reviewed the above imaging data  Discussion/Summary:  51-year-old female status post right breast conservation for stage I invasive ductal carcinoma  She had radiation therapy and completed tamoxifen  There is no evidence of disease based on exam today  She had a negative CT scan of the head secondary to headaches  She is scheduled for an MRI as well  I will make arrangements for her mammogram and exam for next year  I advised her to return sooner should the need arise

## 2022-06-07 ENCOUNTER — TELEPHONE (OUTPATIENT)
Dept: GASTROENTEROLOGY | Facility: CLINIC | Age: 52
End: 2022-06-07

## 2022-06-07 NOTE — TELEPHONE ENCOUNTER
Colon rescheduled to 9/26/22 with Dr Vincent Blair at [address] [facility]  Patient still has prep instructions

## 2022-06-07 NOTE — TELEPHONE ENCOUNTER
Patients GI provider:  Dr Ju Lira    Number to return call: [phone]    Reason for call: Pt calling to reschedule colonoscopy    Scheduled procedure/appointment date if applicable: Apt/procedure 8/30/22

## 2022-07-07 ENCOUNTER — ANNUAL EXAM (OUTPATIENT)
Dept: OBGYN CLINIC | Facility: CLINIC | Age: 52
End: 2022-07-07
Payer: COMMERCIAL

## 2022-07-07 VITALS
HEIGHT: 63 IN | DIASTOLIC BLOOD PRESSURE: 60 MMHG | BODY MASS INDEX: 21.12 KG/M2 | WEIGHT: 119.2 LBS | SYSTOLIC BLOOD PRESSURE: 104 MMHG

## 2022-07-07 DIAGNOSIS — Z11.51 SCREENING FOR HPV (HUMAN PAPILLOMAVIRUS): ICD-10-CM

## 2022-07-07 DIAGNOSIS — Z12.4 CERVICAL CANCER SCREENING: Primary | ICD-10-CM

## 2022-07-07 DIAGNOSIS — Z01.419 ENCOUNTER FOR ANNUAL ROUTINE GYNECOLOGICAL EXAMINATION: ICD-10-CM

## 2022-07-07 PROCEDURE — G0145 SCR C/V CYTO,THINLAYER,RESCR: HCPCS | Performed by: OBSTETRICS & GYNECOLOGY

## 2022-07-07 PROCEDURE — S0612 ANNUAL GYNECOLOGICAL EXAMINA: HCPCS | Performed by: OBSTETRICS & GYNECOLOGY

## 2022-07-07 PROCEDURE — G0476 HPV COMBO ASSAY CA SCREEN: HCPCS | Performed by: OBSTETRICS & GYNECOLOGY

## 2022-07-07 NOTE — PROGRESS NOTES
Assessment/Plan:    pap and HPV done today, if normal, we will space out    mammogram reviewed with her including breast density  These are ordered by Dr Erika Langston whom she sees every year  Discussed self breast exams    colon cancer screening - scheduled for 9-26, this will be her baseline     perimenopause- she will keep track of her cycles    discussed preventive care, regular exercise and a healthy diet      No problem-specific Assessment & Plan notes found for this encounter  There are no diagnoses linked to this encounter  Subjective:      Patient ID: Crystal Espinoza is a 50 y o  female  Pt here for yearly  She has a Paragard that was place in April 2017  She has a menstrual cycle about once a year  She has no gyn complaints  Normal Pap, negative HPV in December 2020 and June 2021, prior to that she had positive HPV and OLIVA with a negative endometrial biopsy  She has had abnormal Paps in the past and had a LEEP many years ago  Normal 3D mammogram in May with dense tissue, history of right breast cancer  The following portions of the patient's history were reviewed and updated as appropriate: allergies, current medications, past family history, past medical history, past social history, past surgical history and problem list     Review of Systems   Constitutional: Negative  Gastrointestinal: Negative  Genitourinary: Negative  Objective: There were no vitals taken for this visit  Physical Exam  Vitals reviewed  Constitutional:       Appearance: She is well-developed  Neck:      Thyroid: No thyromegaly  Trachea: No tracheal deviation  Cardiovascular:      Rate and Rhythm: Normal rate and regular rhythm  Pulmonary:      Effort: Pulmonary effort is normal       Breath sounds: Normal breath sounds  Chest:   Breasts: Breasts are symmetrical       Right: No inverted nipple, mass, nipple discharge, skin change or tenderness        Left: No inverted nipple, mass, nipple discharge, skin change or tenderness  Abdominal:      General: There is no distension  Palpations: Abdomen is soft  There is no mass  Tenderness: There is no abdominal tenderness  Genitourinary:     Labia:         Right: No rash, tenderness, lesion or injury  Left: No rash, tenderness, lesion or injury  Vagina: Normal       Cervix: No cervical motion tenderness, discharge or friability  Adnexa:         Right: No mass, tenderness or fullness  Left: No mass, tenderness or fullness          Rectum: Normal       Comments: IUD strings visible

## 2022-07-14 LAB
LAB AP GYN PRIMARY INTERPRETATION: NORMAL
Lab: NORMAL
PATH INTERP SPEC-IMP: NORMAL

## 2022-09-02 ENCOUNTER — PROCEDURE VISIT (OUTPATIENT)
Dept: GYNECOLOGY | Facility: CLINIC | Age: 52
End: 2022-09-02
Payer: COMMERCIAL

## 2022-09-02 VITALS
HEIGHT: 63 IN | BODY MASS INDEX: 20.87 KG/M2 | DIASTOLIC BLOOD PRESSURE: 60 MMHG | SYSTOLIC BLOOD PRESSURE: 108 MMHG | WEIGHT: 117.8 LBS

## 2022-09-02 DIAGNOSIS — B97.7 HPV (HUMAN PAPILLOMA VIRUS) INFECTION: ICD-10-CM

## 2022-09-02 DIAGNOSIS — Z01.818 PREOPERATIVE TESTING: Primary | ICD-10-CM

## 2022-09-02 PROCEDURE — 88344 IMHCHEM/IMCYTCHM EA MLT ANTB: CPT | Performed by: STUDENT IN AN ORGANIZED HEALTH CARE EDUCATION/TRAINING PROGRAM

## 2022-09-02 PROCEDURE — 57456 ENDOCERV CURETTAGE W/SCOPE: CPT | Performed by: OBSTETRICS & GYNECOLOGY

## 2022-09-02 PROCEDURE — 88305 TISSUE EXAM BY PATHOLOGIST: CPT | Performed by: STUDENT IN AN ORGANIZED HEALTH CARE EDUCATION/TRAINING PROGRAM

## 2022-09-02 PROCEDURE — 88312 SPECIAL STAINS GROUP 1: CPT | Performed by: STUDENT IN AN ORGANIZED HEALTH CARE EDUCATION/TRAINING PROGRAM

## 2022-09-02 PROCEDURE — 81025 URINE PREGNANCY TEST: CPT | Performed by: OBSTETRICS & GYNECOLOGY

## 2022-09-02 NOTE — PROGRESS NOTES
Colposcopy     Date/Time 9/2/2022 2:21 PM     Universal Protocol   Consent: Verbal consent obtained  Written consent obtained  Consent given by: patient  Patient identity confirmed: verbally with patient       Performed by  Serenity Leong DO     Authorized by Serenity Leong DO        Indication    Indications: Positive HPV, normal Pap  Procedure Details   Procedure: Colposcopy w/ endocervical curettage      [place] speculum was placed in the vagina: yes      Under colposcopic examination the transition zone was seen in entirety: yes      Endocervix was curetted using a Kevorkian curette: yes      Monsel's solution was applied: no      Specimen to pathology: yes       Post-procedure      Patient tolerance of procedure: Tolerated well, no immediate complications     Comments         Colposcopy done without difficulty  Patient had a normal Pap and positive HPV  Her last 2 paps and HPV were negative  She has a long history of abnormal Pap and positive HPV  She has had LEEP  The 1st time was many years ago and in 2013, she had EMILI 1-2 on colposcopy and was planning to have a LEEP but this was not done  Her follow-up was normal with 3 normal Paps and 3- HPV test   Since then, she has had ASCUS with HPV or normal Pap and HPV,   EMILI 1 noted on colposcopy in 2018  she has a ParaGard  She bleeds very little  The last time she had any bleeding was last September  Cervix easily visible including the transformation zone  ECC done, cervix appeared normal   We discussed retreatment with LEEP or cryo  She may not be a candidate for cryo if she has a lesion in her endocervical canal   Also, with the ParaGard I am not sure if she is a candidate for a cryo  Will await the results and look into this  We discussed a 2nd opinion from gyn Onc to see if there is any other less invasive way to follow her as she does not like having colposcopy and she has had many over the years

## 2022-09-16 ENCOUNTER — TELEPHONE (OUTPATIENT)
Dept: GASTROENTEROLOGY | Facility: MEDICAL CENTER | Age: 52
End: 2022-09-16

## 2022-10-06 ENCOUNTER — TELEMEDICINE (OUTPATIENT)
Dept: GYNECOLOGY | Facility: CLINIC | Age: 52
End: 2022-10-06
Payer: COMMERCIAL

## 2022-10-06 DIAGNOSIS — N87.1 DYSPLASIA OF CERVIX, HIGH GRADE CIN 2: ICD-10-CM

## 2022-10-06 DIAGNOSIS — D06.9 HIGH GRADE SQUAMOUS INTRAEPITHELIAL LESION (HGSIL), GRADE 3 CIN, ON BIOPSY OF CERVIX: Primary | ICD-10-CM

## 2022-10-06 PROCEDURE — 99212 OFFICE O/P EST SF 10 MIN: CPT | Performed by: OBSTETRICS & GYNECOLOGY

## 2022-10-06 NOTE — PROGRESS NOTES
Assessment/Plan:      There are no diagnoses linked to this encounter  Subjective:     Patient ID: Lex Tai is a 50 y o  female      HPI    Review of Systems      Objective:     Physical Exam

## 2022-10-07 ENCOUNTER — TELEPHONE (OUTPATIENT)
Dept: GYNECOLOGIC ONCOLOGY | Facility: CLINIC | Age: 52
End: 2022-10-07

## 2022-10-07 ENCOUNTER — PATIENT OUTREACH (OUTPATIENT)
Dept: CASE MANAGEMENT | Facility: OTHER | Age: 52
End: 2022-10-07

## 2022-10-07 DIAGNOSIS — R87.613 HSIL (HIGH GRADE SQUAMOUS INTRAEPITHELIAL LESION) ON PAP SMEAR OF CERVIX: Primary | ICD-10-CM

## 2022-10-07 NOTE — PROGRESS NOTES
[facility] received SW referral  Pt has her consult with Dr Papa Laguna on 11/1/22  OSW will place outreach TC to complete distress thermometer and psychosocial assessment

## 2022-10-08 NOTE — PROGRESS NOTES
Virtual Regular Visit    Verification of patient location:    Patient is located in the following state in which I hold an active license [place]      Assessment/Plan:    EMILI 2-3-I explained the need for retreatment  Most likely this will be a conization  I explained that typically hysterectomy is not first-line therapy for moderate to severe dysplasia although given her extensive history, she could discuss this with gyn Oncology and C with their opinion would be  She had some questions regarding her history of breast cancer but I explained that the cervical dysplasia is caused by HPV and this is not related to her breast cancer  She is agreeable with the plan and referral to gyn Oncology is placed  She is aware to call if she has any other questions  Problem List Items Addressed This Visit    None     Visit Diagnoses     High grade squamous intraepithelial lesion (HGSIL), grade 3 EMILI, on biopsy of cervix    -  Primary    Relevant Orders    Ambulatory Referral to Gynecologic Oncology    Dysplasia of cervix, high grade EMILI 2        Relevant Orders    Ambulatory Referral to Gynecologic Oncology               Reason for visit is   Chief Complaint   Patient presents with    Virtual Regular Visit        Encounter provider Saida Maloney DO    Provider located at [address] [id number] [phone]      Recent Visits  Date Type Provider Dept   10/06/22 Narda Bowles 39, [address] recent visits within past 7 days and meeting all other requirements  Future Appointments  No visits were found meeting these conditions  Showing future appointments within next 150 days and meeting all other requirements       The patient was identified by name and date of birth   Guest of a Guest was informed that this is a telemedicine visit and that the visit is being conducted through [address] [place] [address] Road [facility] and patient was informed that this is a secure, HIPAA-compliant platform  She agrees to proceed     My office door was closed  No one else was in the room  She acknowledged consent and understanding of privacy and security of the video platform  The patient has agreed to participate and understands they can discontinue the visit at any time  Patient is aware this is a billable service  Subjective  Josue Jerez is a 50 y o  female who presents for a video visit to discuss management of cervical dysplasia   Patient presents to discuss cervical dysplasia  She has had a long history of abnormal Paps  She had a LEEP several years ago and then thinks she had another procedure a few years later, approximately in 2013  She then had normal Paps for a while and then most recently began having ASCUS with positive HPV in 2017  She was diagnosed with EMILI 1 and this was followed  She then had 2 normal Paps and negative HPV test and then this year had a normal Pap with positive HPV and colposcopy showed EMILI 2-3    We have discussed retreating over the years but now that she has a more significant abnormality, I recommended that she see gyn Oncology  She has been treated twice and most likely this is in the endocervical canal because the EMILI 2-3 was noted on ECC although in squamous epithelium  She would like to have a hysterectomy although the recommendation is a conization  She is frustrated by the many colposcopy is that she has had over the years  She has a ParaGard IUD and has not been having regular menstrual cycles  She does have a history of breast cancer several years ago and she continues to follow with a breast surgeon         Past Medical History:   Diagnosis Date    Anxiety     Bacterial vaginosis     History of infection due to human papilloma virus (HPV)     last assessed - [id number]    HPV (human papilloma virus) infection     Hx of radiation therapy     BALDEMAR-2 gene mutation     Moderate dysplasia of cervix (EMILI II) last assessed - [id number]    Ovarian cyst     last assessed - [id number]    Pap smear abnormality of cervix with ASCUS favoring dysplasia     last assessed - [id number]    Secondary amenorrhea     Thrombocytosis     Use of tamoxifen (Nolvadex)     last assessed - [id number]    Varicella        Past Surgical History:   Procedure Laterality Date    BREAST LUMPECTOMY Right     last assessed - [id number]    BREAST LUMPECTOMY Right 05/11/2009    BREAST LUMPECTOMY Right 06/05/2009    BREAST RECONSTRUCTION Bilateral 10/2018    bilateral implants    BREAST RECONSTRUCTION Right 02/2019    right implant    CERVICAL BIOPSY  W/ LOOP ELECTRODE EXCISION      COLPOSCOPY W/ BIOPSY / CURETTAGE      Colposcopy Cervix with Biopsy(s) with Endocervical Currettage    MASTECTOMY, PARTIAL Right 05/11/2009    right partial mastectomy re-excision 6/26/09    SENTINEL LYMPH NODE BIOPSY Right        Current Outpatient Medications   Medication Sig Dispense Refill    aspirin (ECOTRIN LOW STRENGTH) 81 mg EC tablet Take 81 mg by mouth daily      azithromycin (ZITHROMAX) 250 mg tablet TAKE 2 TABLETS BY MOUTH TODAY, THEN TAKE 1 TABLET DAILY FOR 4 DAYS      intrauterine copper (PARAGARD) IUD 1 each by Intrauterine route once      LORazepam (ATIVAN) 0 5 mg tablet Take 1 tablet (0 5 mg total) by mouth daily as needed for anxiety 20 tablet 0    Multiple Vitamin (MULTIVITAMIN) capsule Take by mouth       No current facility-administered medications for this visit  Allergies   Allergen Reactions    Cephalexin Rash       Review of Systems   Constitutional: Negative  Gastrointestinal: Negative  Genitourinary: Negative  Video Exam    There were no vitals filed for this visit      Physical Exam     I spent 6 minutes directly with the patient during this visit

## 2022-10-10 ENCOUNTER — DOCUMENTATION (OUTPATIENT)
Dept: HEMATOLOGY ONCOLOGY | Facility: CLINIC | Age: 52
End: 2022-10-10

## 2022-10-10 NOTE — PROGRESS NOTES
Intake received  Pt has active [employer]   Pt also has active [employer]  That plan will pay after [employer] pays    Pt outreach not needed at this time

## 2022-11-01 ENCOUNTER — CONSULT (OUTPATIENT)
Dept: GYNECOLOGIC ONCOLOGY | Facility: CLINIC | Age: 52
End: 2022-11-01

## 2022-11-01 VITALS
RESPIRATION RATE: 16 BRPM | BODY MASS INDEX: 20.91 KG/M2 | OXYGEN SATURATION: 98 % | DIASTOLIC BLOOD PRESSURE: 88 MMHG | TEMPERATURE: 97.4 F | HEART RATE: 82 BPM | HEIGHT: 63 IN | WEIGHT: 118 LBS | SYSTOLIC BLOOD PRESSURE: 126 MMHG

## 2022-11-01 DIAGNOSIS — N87.1 DYSPLASIA OF CERVIX, HIGH GRADE CIN 2: ICD-10-CM

## 2022-11-01 DIAGNOSIS — D06.9 CIN III (CERVICAL INTRAEPITHELIAL NEOPLASIA III): Primary | ICD-10-CM

## 2022-11-01 DIAGNOSIS — D06.9 HIGH GRADE SQUAMOUS INTRAEPITHELIAL LESION (HGSIL), GRADE 3 CIN, ON BIOPSY OF CERVIX: ICD-10-CM

## 2022-11-01 NOTE — ASSESSMENT & PLAN NOTE
54yo with h/o stage IB breast cancer now with abnormal colposcopy presents for consultation    I have reviewed patient's history as well as her pathology results past     D/w pt her abnormal pap and colposcopy results showing high grade lesion  D/w pt HPV related dysplasia and its progressive course in becoming cancer if not cleared  D/w pt that in order to 1  Determine there is not an invasive component and 2  Remove the lesion to prevent progression, a surgical excision is necessary  Patient has a history of abnormal Pap smears that have been intermittently normal   We had an extensive discussion regarding the natural course of cervical dysplasia in the affects of HPV as well as the current guidelines that decrease the amount of samples and invasive procedures  Patient is interested in definitive management with hysterectomy  We discussed that if her Paps and colposcopy had revealed persistent low-grade lesion that this would be a reasonable option however given the high-grade dysplasia there is a small chance of an underlying carcinoma in which case a simple hysterectomy or minimally invasive procedure would not be the preferred surgical procedure  We discussed that she has a small cervix and as such repeat excision will most likely make further excisional procedures impossible  Therefore, should she have an abnormal Pap smear again in the future or abnormal colposcopy hysterectomy would most definitely be necessary  We further discussed that if she underwent hysterectomy that this would not and future Pap smears as she would need continued routine screening  I gave time for patient to ask pertinent questions and patient felt satisfied with the course of action  She agrees to proceed with cold knife cone at this time  D/w pt that pending the CKC results, subsequent treatment would be further discussed but CKC could be sufficient    Risks and benefits of the procedure were addressed including pain, bleeding, infection, injury to surrounding organs, need for repeat procedures      Consents signed

## 2022-11-01 NOTE — PROGRESS NOTES
Assessment/Plan:    Problem List Items Addressed This Visit        Genitourinary    EMILI III (cervical intraepithelial neoplasia III) - Primary     54yo with h/o stage IB breast cancer now with abnormal colposcopy presents for consultation    I have reviewed patient's history as well as her pathology results past     D/w pt her abnormal pap and colposcopy results showing high grade lesion  D/w pt HPV related dysplasia and its progressive course in becoming cancer if not cleared  D/w pt that in order to 1  Determine there is not an invasive component and 2  Remove the lesion to prevent progression, a surgical excision is necessary  Patient has a history of abnormal Pap smears that have been intermittently normal   We had an extensive discussion regarding the natural course of cervical dysplasia in the affects of HPV as well as the current guidelines that decrease the amount of samples and invasive procedures  Patient is interested in definitive management with hysterectomy  We discussed that if her Paps and colposcopy had revealed persistent low-grade lesion that this would be a reasonable option however given the high-grade dysplasia there is a small chance of an underlying carcinoma in which case a simple hysterectomy or minimally invasive procedure would not be the preferred surgical procedure  We discussed that she has a small cervix and as such repeat excision will most likely make further excisional procedures impossible  Therefore, should she have an abnormal Pap smear again in the future or abnormal colposcopy hysterectomy would most definitely be necessary  We further discussed that if she underwent hysterectomy that this would not and future Pap smears as she would need continued routine screening  I gave time for patient to ask pertinent questions and patient felt satisfied with the course of action  She agrees to proceed with cold knife cone at this time   D/w pt that pending the [facility] results, subsequent treatment would be further discussed but CKC could be sufficient  Risks and benefits of the procedure were addressed including pain, bleeding, infection, injury to surrounding organs, need for repeat procedures  Consents signed             Other Visit Diagnoses     High grade squamous intraepithelial lesion (HGSIL), grade 3 EMILI, on biopsy of cervix        Dysplasia of cervix, high grade EMILI 2        Relevant Orders    Case request operating room: BIOPSY CONE/COLD KNIFE CERVIX (Completed)    CBC and Platelet    Basic metabolic panel        I have spent 50 minutes with Patient  today in which greater than 50% of this time was spent in counseling/coordination of care regarding Diagnostic results, Prognosis, Intructions for management, Risk factor reductions and Impressions  CHIEF COMPLAINT: CIN3    Oncology Problem:  Cancer Staging  Personal history of adenocarcinoma of breast  Staging form: Breast, AJCC 7th Edition  - Pathologic: Stage IA (T1b, N0, cM0) - Signed by Larry Reilly MD on 5/21/2018      Previous therapy:  Oncology History Overview Note   9/2018 - observation with 81 mg ASA daily     Personal history of adenocarcinoma of breast   4/2009 Initial Diagnosis    Invasive ductal carcinoma of breast, female, right ([facility] [address] )     4/2009 - 5/2009 Cancer Staged     T1 B  N0, ER/MA positive, HER-2 negative, infiltrating ductal carcinoma  Stage IA, right     5/11/2009 Surgery    Right partial mastectomy, SLNB, breast implant  Dr Anna Monterroso     5/2009 Genetic Testing    BRCA negative     2009 - 2009 Radiation    WBRT  Dr Martinez July 6/2009 - 6/2014 Hormone Therapy    Tamoxifen     Essential thrombocytosis ([facility] [address] )   6/13/2018 Initial Diagnosis    In the past, Plt count had increased and return to normal   Pt was screened for ET with Jorge mutation and was negative  Then in June 2018, platelet count elevated again and mutation testing was completed        8/23/2018 Genomic Testing    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  BCR-aBL was negative via FISH  Most recent imaging:  Mammo screening bilateral w 3d & cad  Narrative: DIAGNOSIS: Breast cancer screening, high risk patient     TECHNIQUE:  Digital screening mammography was performed  Computer Aided Detection   (CAD) analyzed all applicable images  COMPARISONS: Prior breast imaging dated: 05/13/2021, 05/11/2020,   05/09/2019, 05/07/2018, 05/04/2017, 05/03/2016, 04/16/2015, and 04/10/2014    RELEVANT HISTORY:   Family Breast Cancer History: No known family history of breast cancer  Family Medical History: Family medical history includes colon cancer in   cousin and ovarian cancer in paternal grandmother  Personal History: Hormone history includes tamoxifen  Surgical history   includes lumpectomy, mastectomy, and breast explant  Medical history   includes breast cancer  The patient is scheduled in a reminder system for screening mammography  8-10% of cancers will be missed on mammography  Management of a palpable   abnormality must be based on clinical grounds  Patients will be notified   of their results via letter from our facility  Accredited by [employer] and FDA  RISK ASSESSMENT:   [facility] risk assessment reporting was suppressed due to the patient's   history and/or demographic factors  TISSUE DENSITY:   The breasts are heterogeneously dense, which may obscure small masses  INDICATION: Caryn Mcclure is a 50 y o  female presenting for screening   mammography  FINDINGS:   Bilateral  There are no suspicious masses, grouped microcalcifications or areas of   unexplained architectural distortion  The skin and nipple areolar complex   are unremarkable  There are stable postsurgical changes in the right breast   There are   bilateral retropectoral silicone implants  Impression: No mammographic evidence of malignancy      ASSESSMENT/BI-RADS CATEGORY:  Left: 2 - Benign  Right: 2 - Benign  Overall: 2 - Benign    RECOMMENDATION:       - Routine screening mammogram in 1 year for both breasts  Workstation ID: [id number]        Patient ID: Matt Avalos is a 50 y o  female  54yo with h/o stage IB breast cancer now with abnormal colposcopy presents for consultation  Pt with h/o abnormal paps LEEP many years ago, possibly 2x  She has IUD in place without complaints  No intermenstrual spotting, post coital spotting  No chnages in urination/BMs    2/2017: ASCUS/-HPV  11/2017: colpo CIN1  2/2018: NIL  6/2019: AGC/+HPV  6/2020: ASCUS, EMB/ECC neg  12/2020 NIL/-HPV  6/2021 NIL/-HPV  7/2022: NIL/+HPV  9/2022: ECC CIN2-3        Review of Systems   Constitutional: Negative for appetite change, chills, fatigue and fever  Respiratory: Negative for chest tightness and shortness of breath  Gastrointestinal: Negative for abdominal distention, abdominal pain, constipation, diarrhea and nausea  Genitourinary: Negative for difficulty urinating, flank pain, frequency, urgency, vaginal bleeding, vaginal discharge and vaginal pain  Musculoskeletal: Negative for back pain, joint swelling and myalgias  Skin: Negative for rash  Neurological: Negative for dizziness, light-headedness, numbness and headaches  Psychiatric/Behavioral: The patient is not nervous/anxious  Current Outpatient Medications   Medication Sig Dispense Refill   • aspirin (ECOTRIN LOW STRENGTH) 81 mg EC tablet Take 81 mg by mouth daily     • intrauterine copper (PARAGARD) IUD 1 each by Intrauterine route once     • LORazepam (ATIVAN) 0 5 mg tablet Take 1 tablet (0 5 mg total) by mouth daily as needed for anxiety 20 tablet 0   • azithromycin (ZITHROMAX) 250 mg tablet TAKE 2 TABLETS BY MOUTH TODAY, THEN TAKE 1 TABLET DAILY FOR 4 DAYS (Patient not taking: Reported on 11/1/2022)     • Multiple Vitamin (MULTIVITAMIN) capsule Take by mouth       No current facility-administered medications for this visit         Allergies   Allergen Reactions   • Cephalexin Rash       Past Medical History:   Diagnosis Date   • Anxiety    • Bacterial vaginosis    • History of infection due to human papilloma virus (HPV)     last assessed - [id number]   • HPV (human papilloma virus) infection    • Hx of radiation therapy    • BALDEMAR-2 gene mutation    • Moderate dysplasia of cervix (EMILI II)     last assessed - [id number]   • Ovarian cyst     last assessed - [id number]   • Pap smear abnormality of cervix with ASCUS favoring dysplasia     last assessed - [id number]   • Secondary amenorrhea    • Thrombocytosis    • Use of tamoxifen (Nolvadex)     last assessed - [id number]   • Varicella        Past Surgical History:   Procedure Laterality Date   • BREAST LUMPECTOMY Right     last assessed - [id number]   • BREAST LUMPECTOMY Right 2009   • BREAST LUMPECTOMY Right 2009   • BREAST RECONSTRUCTION Bilateral 10/2018    bilateral implants   • BREAST RECONSTRUCTION Right 2019    right implant   • CERVICAL BIOPSY  W/ LOOP ELECTRODE EXCISION     • COLPOSCOPY W/ BIOPSY / CURETTAGE      Colposcopy Cervix with Biopsy(s) with Endocervical Currettage   • MASTECTOMY, PARTIAL Right 2009    right partial mastectomy re-excision 09   • SENTINEL LYMPH NODE BIOPSY Right        OB History        0    Para   0    Term   0       0    AB   0    Living   0       SAB   0    IAB   0    Ectopic   0    Multiple   0    Live Births   0           Obstetric Comments   17years old (menarche)             Family History   Problem Relation Age of Onset   • No Known Problems Mother    • No Known Problems Father    • No Known Problems Sister    • No Known Problems Maternal Grandmother    • No Known Problems Maternal Grandfather    • Ovarian cancer Paternal Grandmother         age unknown   • No Known Problems Paternal Grandfather    • No Known Problems Maternal Aunt    • Cancer Paternal Aunt 61   • No Known Problems Paternal Aunt    • No Known Problems Paternal Aunt    • Colon polyps Cousin    • Colon cancer Cousin 48   • No Known Problems Half-Sister        The following portions of the patient's history were reviewed and updated as appropriate: allergies, current medications, past family history, past medical history, past social history, past surgical history and problem list       Objective:    Blood pressure 126/88, pulse 82, temperature (!) 97 4 °F (36 3 °C), temperature source Temporal, resp  rate 16, height 5' 3" (1 6 m), weight 53 5 kg (118 lb), SpO2 98 %, not currently breastfeeding  Body mass index is 20 9 kg/m²  Physical Exam  HENT:      Head: Normocephalic and atraumatic  Cardiovascular:      Rate and Rhythm: Normal rate and regular rhythm  Pulmonary:      Effort: Pulmonary effort is normal    Abdominal:      General: There is no distension  Palpations: Abdomen is soft  There is no mass  Genitourinary:     Comments: The external female genitalia is normal  The bartholin's, uretheral and skenes glands are normal  The urethral meatus is normal (midline with no lesions)  Anus without fissure or lesion  Speculum exam reveals vagina without lesion or discharge  Cervix is normal appearing without visible lesions and IUD strings in place  No bleeding  No significant cystocele or rectocele noted  Bimanual exam notes a uterus with normal contour, mobility  Small cervix but still cervical tissue present  No tenderness  Adnexa without masses or tenderness  Bladder is without fullness, mass or tenderness  Musculoskeletal:         General: Normal range of motion  Cervical back: Normal range of motion  Skin:     General: Skin is warm and dry  Neurological:      Mental Status: She is alert and oriented to person, place, and time             No results found for:   Lab Results   Component Value Date    WBC 6 8 03/27/2018    HGB 13 8 03/27/2018    HCT 42 6 03/27/2018    MCV 88 0 03/27/2018     (H) 03/27/2018     Lab Results   Component Value Date     02/03/2016    K 4 1 02/03/2016     02/03/2016    CO2 22 02/03/2016    ANIONGAP 3 (L) 04/06/2015    BUN 22 02/03/2016    CREATININE 0 90 02/03/2016    GLUCOSE 76 04/06/2015    CALCIUM 9 3 02/03/2016    AST 17 02/03/2016    ALT 12 02/03/2016    ALKPHOS 44 02/03/2016    PROT 6 6 02/03/2016    BILITOT 0 7 02/03/2016        Trend:  No results found for:

## 2022-11-02 ENCOUNTER — PATIENT OUTREACH (OUTPATIENT)
Dept: CASE MANAGEMENT | Facility: OTHER | Age: 52
End: 2022-11-02

## 2022-11-02 NOTE — PROGRESS NOTES
OSW performed chart review  Pt will be having a cold knife cone biopsy competed  OSW will continue to follow

## 2022-11-08 ENCOUNTER — TELEPHONE (OUTPATIENT)
Dept: GYNECOLOGIC ONCOLOGY | Facility: CLINIC | Age: 52
End: 2022-11-08

## 2022-11-08 NOTE — TELEPHONE ENCOUNTER
Patient called in to r/s her surgery date along with her post op appointment following that   Patient can be contacted back @ [phone]

## 2022-11-16 ENCOUNTER — PATIENT OUTREACH (OUTPATIENT)
Dept: CASE MANAGEMENT | Facility: OTHER | Age: 52
End: 2022-11-16

## 2022-11-16 NOTE — PROGRESS NOTES
OSW performed chart review  Pt is scheduled for her surgery on 1/20/23  OSW will continue to follow

## 2023-01-16 ENCOUNTER — ANESTHESIA EVENT (OUTPATIENT)
Dept: PERIOP | Facility: HOSPITAL | Age: 53
End: 2023-01-16

## 2023-01-16 NOTE — PRE-PROCEDURE INSTRUCTIONS
Pre-Surgery Instructions:   Medication Instructions   • aspirin (ECOTRIN LOW STRENGTH) 81 mg EC tablet Stop taking 7 days prior to surgery  • intrauterine copper (PARAGARD) IUD N/A   • LORazepam (ATIVAN) 0 5 mg tablet Uses PRN- OK to take day of surgery   Verbal pre-op instructions given via phone  Pt verbalizes understanding

## 2023-01-20 ENCOUNTER — ANESTHESIA (OUTPATIENT)
Dept: PERIOP | Facility: HOSPITAL | Age: 53
End: 2023-01-20

## 2023-01-20 ENCOUNTER — HOSPITAL ENCOUNTER (OUTPATIENT)
Facility: HOSPITAL | Age: 53
Setting detail: OUTPATIENT SURGERY
Discharge: HOME | End: 2023-01-20
Attending: OBSTETRICS & GYNECOLOGY | Admitting: OBSTETRICS & GYNECOLOGY

## 2023-01-20 VITALS
HEART RATE: 58 BPM | HEIGHT: 63 IN | BODY MASS INDEX: 20.35 KG/M2 | OXYGEN SATURATION: 100 % | WEIGHT: 114.86 LBS | TEMPERATURE: 97.6 F | SYSTOLIC BLOOD PRESSURE: 114 MMHG | RESPIRATION RATE: 16 BRPM | DIASTOLIC BLOOD PRESSURE: 78 MMHG

## 2023-01-20 DIAGNOSIS — N87.1 DYSPLASIA OF CERVIX, HIGH GRADE CIN 2: ICD-10-CM

## 2023-01-20 LAB
EXT PREGNANCY TEST URINE: NEGATIVE
EXT. CONTROL: NORMAL

## 2023-01-20 RX ORDER — ONDANSETRON 2 MG/ML
4 INJECTION INTRAMUSCULAR; INTRAVENOUS EVERY 6 HOURS PRN
Status: DISCONTINUED | OUTPATIENT
Start: 2023-01-20 | End: 2023-01-20 | Stop reason: HOSPADM

## 2023-01-20 RX ORDER — IBUPROFEN 600 MG/1
600 TABLET ORAL EVERY 6 HOURS PRN
Status: DISCONTINUED | OUTPATIENT
Start: 2023-01-20 | End: 2023-01-20 | Stop reason: HOSPADM

## 2023-01-20 RX ORDER — MIDAZOLAM HYDROCHLORIDE 2 MG/2ML
INJECTION, SOLUTION INTRAMUSCULAR; INTRAVENOUS AS NEEDED
Status: DISCONTINUED | OUTPATIENT
Start: 2023-01-20 | End: 2023-01-20

## 2023-01-20 RX ORDER — SCOLOPAMINE TRANSDERMAL SYSTEM 1 MG/1
1 PATCH, EXTENDED RELEASE TRANSDERMAL ONCE
Status: DISCONTINUED | OUTPATIENT
Start: 2023-01-20 | End: 2023-01-20 | Stop reason: HOSPADM

## 2023-01-20 RX ORDER — PROPOFOL 10 MG/ML
INJECTION, EMULSION INTRAVENOUS AS NEEDED
Status: DISCONTINUED | OUTPATIENT
Start: 2023-01-20 | End: 2023-01-20

## 2023-01-20 RX ORDER — PROPOFOL 10 MG/ML
INJECTION, EMULSION INTRAVENOUS CONTINUOUS PRN
Status: DISCONTINUED | OUTPATIENT
Start: 2023-01-20 | End: 2023-01-20

## 2023-01-20 RX ORDER — DEXAMETHASONE SODIUM PHOSPHATE 10 MG/ML
INJECTION, SOLUTION INTRAMUSCULAR; INTRAVENOUS AS NEEDED
Status: DISCONTINUED | OUTPATIENT
Start: 2023-01-20 | End: 2023-01-20

## 2023-01-20 RX ORDER — ONDANSETRON 2 MG/ML
4 INJECTION INTRAMUSCULAR; INTRAVENOUS ONCE AS NEEDED
Status: DISCONTINUED | OUTPATIENT
Start: 2023-01-20 | End: 2023-01-20 | Stop reason: HOSPADM

## 2023-01-20 RX ORDER — FENTANYL CITRATE/PF 50 MCG/ML
25 SYRINGE (ML) INJECTION
Status: COMPLETED | OUTPATIENT
Start: 2023-01-20 | End: 2023-01-20

## 2023-01-20 RX ORDER — MAGNESIUM HYDROXIDE 1200 MG/15ML
LIQUID ORAL AS NEEDED
Status: DISCONTINUED | OUTPATIENT
Start: 2023-01-20 | End: 2023-01-20 | Stop reason: HOSPADM

## 2023-01-20 RX ORDER — ACETAMINOPHEN 325 MG/1
975 TABLET ORAL EVERY 6 HOURS PRN
Status: DISCONTINUED | OUTPATIENT
Start: 2023-01-20 | End: 2023-01-20 | Stop reason: HOSPADM

## 2023-01-20 RX ORDER — LIDOCAINE HYDROCHLORIDE AND EPINEPHRINE 10; 10 MG/ML; UG/ML
INJECTION, SOLUTION INFILTRATION; PERINEURAL AS NEEDED
Status: DISCONTINUED | OUTPATIENT
Start: 2023-01-20 | End: 2023-01-20 | Stop reason: HOSPADM

## 2023-01-20 RX ORDER — FENTANYL CITRATE 50 UG/ML
INJECTION, SOLUTION INTRAMUSCULAR; INTRAVENOUS AS NEEDED
Status: DISCONTINUED | OUTPATIENT
Start: 2023-01-20 | End: 2023-01-20

## 2023-01-20 RX ORDER — ONDANSETRON 2 MG/ML
INJECTION INTRAMUSCULAR; INTRAVENOUS AS NEEDED
Status: DISCONTINUED | OUTPATIENT
Start: 2023-01-20 | End: 2023-01-20

## 2023-01-20 RX ORDER — LIDOCAINE HYDROCHLORIDE 20 MG/ML
INJECTION, SOLUTION EPIDURAL; INFILTRATION; INTRACAUDAL; PERINEURAL AS NEEDED
Status: DISCONTINUED | OUTPATIENT
Start: 2023-01-20 | End: 2023-01-20

## 2023-01-20 RX ORDER — SODIUM CHLORIDE 9 MG/ML
125 INJECTION, SOLUTION INTRAVENOUS CONTINUOUS
Status: DISCONTINUED | OUTPATIENT
Start: 2023-01-20 | End: 2023-01-20 | Stop reason: HOSPADM

## 2023-01-20 RX ORDER — ACETIC ACID 5 %
100 LIQUID (ML) MISCELLANEOUS ONCE
Status: DISCONTINUED | OUTPATIENT
Start: 2023-01-20 | End: 2023-01-20 | Stop reason: HOSPADM

## 2023-01-20 RX ADMIN — PROPOFOL 150 MG: 10 INJECTION, EMULSION INTRAVENOUS at 11:14

## 2023-01-20 RX ADMIN — ONDANSETRON 4 MG: 2 INJECTION INTRAMUSCULAR; INTRAVENOUS at 11:20

## 2023-01-20 RX ADMIN — FENTANYL CITRATE 25 MCG: 50 INJECTION, SOLUTION INTRAMUSCULAR; INTRAVENOUS at 12:31

## 2023-01-20 RX ADMIN — SCOPALAMINE 1 PATCH: 1 PATCH, EXTENDED RELEASE TRANSDERMAL at 09:24

## 2023-01-20 RX ADMIN — PROPOFOL 150 MCG/KG/MIN: 10 INJECTION, EMULSION INTRAVENOUS at 11:17

## 2023-01-20 RX ADMIN — SODIUM CHLORIDE 125 ML/HR: 0.9 INJECTION, SOLUTION INTRAVENOUS at 12:21

## 2023-01-20 RX ADMIN — DEXAMETHASONE SODIUM PHOSPHATE 10 MG: 10 INJECTION INTRAMUSCULAR; INTRAVENOUS at 11:20

## 2023-01-20 RX ADMIN — IBUPROFEN 600 MG: 600 TABLET, FILM COATED ORAL at 13:13

## 2023-01-20 RX ADMIN — LIDOCAINE HYDROCHLORIDE 80 MG: 20 INJECTION, SOLUTION EPIDURAL; INFILTRATION; INTRACAUDAL; PERINEURAL at 11:14

## 2023-01-20 RX ADMIN — FENTANYL CITRATE 50 MCG: 50 INJECTION INTRAMUSCULAR; INTRAVENOUS at 11:14

## 2023-01-20 RX ADMIN — SODIUM CHLORIDE 125 ML/HR: 0.9 INJECTION, SOLUTION INTRAVENOUS at 09:52

## 2023-01-20 RX ADMIN — FENTANYL CITRATE 25 MCG: 50 INJECTION, SOLUTION INTRAMUSCULAR; INTRAVENOUS at 12:23

## 2023-01-20 RX ADMIN — FENTANYL CITRATE 25 MCG: 50 INJECTION, SOLUTION INTRAMUSCULAR; INTRAVENOUS at 12:07

## 2023-01-20 RX ADMIN — MIDAZOLAM 2 MG: 1 INJECTION INTRAMUSCULAR; INTRAVENOUS at 11:06

## 2023-01-20 RX ADMIN — FENTANYL CITRATE 25 MCG: 50 INJECTION, SOLUTION INTRAMUSCULAR; INTRAVENOUS at 12:17

## 2023-01-20 NOTE — ANESTHESIA POSTPROCEDURE EVALUATION
Post-Op Assessment Note    CV Status:  Stable    Pain management: adequate     Mental Status:  Alert and awake   Hydration Status:  Euvolemic   PONV Controlled:  Controlled   Airway Patency:  Patent      Post Op Vitals Reviewed: Yes            No notable events documented      BP      Temp      Pulse     Resp      SpO2      /78   Pulse 58   Temp 97 6 °F (36 4 °C)   Resp 16   Ht 5' 3" (1 6 m)   Wt 52 1 kg (114 lb 13 8 oz)   LMP 09/20/2021 (Approximate)   SpO2 100%   BMI 20 35 kg/m²

## 2023-01-20 NOTE — OP NOTE
OPERATIVE REPORT  PATIENT NAME: Agus Alonzo    :  1970  MRN: [id number]  Pt Location: [facility] OR ROOM 08    SURGERY DATE: 2023    Surgeon(s) and Role:     * Wing Bhakta MD - Primary     * Tin Jade MD - Assisting    Preop Diagnosis:  Dysplasia of cervix, high grade EMILI 2 [N87 1]    Post-Op Diagnosis Codes: * Dysplasia of cervix, high grade EMILI 2 [N87 1]    Procedure(s) (LRB):  COLD KNIFE CONE, ENDOMETRIAL CURETTAGE (N/A)    Specimen(s):  ID Type Source Tests Collected by Time Destination   1 : endocervical curretting Tissue Endocervical TISSUE EXAM Wing Bhakta MD 2023 11:28 AM    2 : cervical cold cone suture marks 12 o'clock Tissue Vaginal/Cervical TISSUE EXAM Wing Bhakta MD 2023 11:28 AM        Estimated Blood Loss:   Minimal    Drains:  * No LDAs found *    Anesthesia Type:   General/LMA    Operative Indications:  Dysplasia of cervix, high grade EMILI 2 [N87 1]  51yo with hx of abnormal pap found to have CIN3 on ECC  She has a good understanding and has agreed to proceed with definitive surgical management  Operative Findings:  Small uterus/cervix  No lugols uptake appreciated  IUD strings in place with base of IUD noted at top of CKC margins given small intra-uterine cavity  Complications:   None    Procedure and Technique:  The patient was met in the preoperative area where history and physical were reviewed, consents were reviewed, and all questions were answered  The patient was then taken to the operating room by anesthesia where general anesthesia was performed without difficulty  The patient was then placed in the dorsal lithotomy position and an exam under anesthesia was performed with the above-noted findings noted  A patient safety time-out was performed with all members of the operating room team present  The patient was then prepped and draped in the usual sterile fashion   Attention was directed to the perineum where the cervix was easily visualized with Ji Nugent and [place] retractors and grasped at the anterior lip with a single-tooth tenaculum  Lugol's solution was applied to the cervix  The cervix was then injected with 1% lidocaine with 1:100,000 epinephrine for anesthesia and hemostasis  A cone biopsy was performed in the usual fashion with a scalpel  An endocervical currettage was then performed  The IUD strings were inadvertently cut with proximity to the lower uterine segment  Posterior noted to be deep and recto-vaginal exam performed with intact bowel and peritoneum  The posterior edges were reapproximated using 3-0 vicryl for support  The cone bed was then cauterized with Bovie cautery and Monsel's solution was applied  The cone bed was noted to be hemostatic  The tenaculum was removed and the tenaculum site was also noted to be hemostatic  At this point, the patient was taken out of dorsal lithotomy position  Anesthesia was reversed without difficulty  Lap, sponge, needle, and instrument counts were correct x2  The patient was taken to the recovery room in stable condition  I was present for the entire procedure  All sponge, needle and instrument counts were correct x2  Anesthesia was reversed and the patient was taken to the PACU in a stable condition       I was present for the entire procedure    Patient Disposition:  PACU     SIGNATURE: Wm Davey MD  DATE: January 20, 2023  TIME: 11:45 AM

## 2023-01-20 NOTE — ANESTHESIA PREPROCEDURE EVALUATION
Procedure:  COLD KNIFE CONE, ENDOMETRIAL CURETTAGE (Cervix)    Relevant Problems   ANESTHESIA   (+) PONV (postoperative nausea and vomiting)      NEURO/PSYCH   (+) Encounter for follow-up surveillance of breast cancer   (+) Personal history of adenocarcinoma of breast      Genitourinary   (+) EMILI III (cervical intraepithelial neoplasia III)      Hematopoietic and Hemostatic   (+) Essential thrombocytosis (HCC)      Other   (+) HPV (human papilloma virus) infection   (+) Status post right breast lumpectomy        Physical Exam    Airway    Mallampati score: I  TM Distance: >3 FB  Neck ROM: full     Dental   No notable dental hx     Cardiovascular  Rhythm: regular, Rate: normal, Cardiovascular exam normal    Pulmonary  Pulmonary exam normal Breath sounds clear to auscultation,     Other Findings        Anesthesia Plan  ASA Score- 2     Anesthesia Type- general with ASA Monitors  Additional Monitors:   Airway Plan: LMA  Comment: SCOP patch ordered  Plan Factors-    Chart reviewed  Existing labs reviewed  Patient summary reviewed  Patient is not a current smoker  Patient not instructed to abstain from smoking on day of procedure  Patient did not smoke on day of surgery  Induction- intravenous  Postoperative Plan-     Informed Consent- Anesthetic plan and risks discussed with patient Julissa Livingston (SO))

## 2023-01-22 ENCOUNTER — TELEPHONE (OUTPATIENT)
Dept: GYNECOLOGIC ONCOLOGY | Facility: CLINIC | Age: 53
End: 2023-01-22

## 2023-01-22 ENCOUNTER — APPOINTMENT (EMERGENCY)
Dept: CT IMAGING | Facility: HOSPITAL | Age: 53
End: 2023-01-22

## 2023-01-22 ENCOUNTER — HOSPITAL ENCOUNTER (EMERGENCY)
Facility: HOSPITAL | Age: 53
Discharge: HOME | End: 2023-01-22
Attending: EMERGENCY MEDICINE

## 2023-01-22 ENCOUNTER — TELEPHONE (OUTPATIENT)
Dept: OTHER | Facility: OTHER | Age: 53
End: 2023-01-22

## 2023-01-22 ENCOUNTER — APPOINTMENT (EMERGENCY)
Dept: RADIOLOGY | Facility: HOSPITAL | Age: 53
End: 2023-01-22

## 2023-01-22 VITALS
TEMPERATURE: 97.8 F | DIASTOLIC BLOOD PRESSURE: 58 MMHG | RESPIRATION RATE: 20 BRPM | BODY MASS INDEX: 21.28 KG/M2 | WEIGHT: 120.15 LBS | HEART RATE: 95 BPM | OXYGEN SATURATION: 97 % | SYSTOLIC BLOOD PRESSURE: 98 MMHG

## 2023-01-22 DIAGNOSIS — K59.00 CONSTIPATION: ICD-10-CM

## 2023-01-22 DIAGNOSIS — R10.9 ABDOMINAL PAIN: Primary | ICD-10-CM

## 2023-01-22 DIAGNOSIS — R11.0 NAUSEA: ICD-10-CM

## 2023-01-22 DIAGNOSIS — R16.1 SPLENOMEGALY: ICD-10-CM

## 2023-01-22 LAB
ALBUMIN SERPL BCP-MCNC: 4.3 G/DL (ref 3.5–5)
ALP SERPL-CCNC: 52 U/L (ref 34–104)
ALT SERPL W P-5'-P-CCNC: 12 U/L (ref 7–52)
ANION GAP SERPL CALCULATED.3IONS-SCNC: 9 MMOL/L (ref 4–13)
AST SERPL W P-5'-P-CCNC: 14 U/L (ref 13–39)
BACTERIA UR QL AUTO: ABNORMAL /HPF
BASOPHILS # BLD AUTO: 0.05 THOUSANDS/ÂΜL (ref 0–0.1)
BASOPHILS NFR BLD AUTO: 1 % (ref 0–1)
BILIRUB DIRECT SERPL-MCNC: 0.19 MG/DL (ref 0–0.2)
BILIRUB SERPL-MCNC: 0.91 MG/DL (ref 0.2–1)
BILIRUB UR QL STRIP: NEGATIVE
BUN SERPL-MCNC: 13 MG/DL (ref 5–25)
CALCIUM SERPL-MCNC: 9.6 MG/DL (ref 8.4–10.2)
CHLORIDE SERPL-SCNC: 105 MMOL/L (ref 96–108)
CLARITY UR: CLEAR
CO2 SERPL-SCNC: 26 MMOL/L (ref 21–32)
COLOR UR: YELLOW
CREAT SERPL-MCNC: 0.88 MG/DL (ref 0.6–1.3)
EOSINOPHIL # BLD AUTO: 0.12 THOUSAND/ÂΜL (ref 0–0.61)
EOSINOPHIL NFR BLD AUTO: 2 % (ref 0–6)
ERYTHROCYTE [DISTWIDTH] IN BLOOD BY AUTOMATED COUNT: 14.3 % (ref 11.6–15.1)
GFR SERPL CREATININE-BSD FRML MDRD: 76 ML/MIN/1.73SQ M
GLUCOSE SERPL-MCNC: 98 MG/DL (ref 65–140)
GLUCOSE UR STRIP-MCNC: NEGATIVE MG/DL
HCT VFR BLD AUTO: 42.4 % (ref 34.8–46.1)
HGB BLD-MCNC: 14.2 G/DL (ref 11.5–15.4)
HGB UR QL STRIP.AUTO: ABNORMAL
IMM GRANULOCYTES # BLD AUTO: 0.02 THOUSAND/UL (ref 0–0.2)
IMM GRANULOCYTES NFR BLD AUTO: 0 % (ref 0–2)
KETONES UR STRIP-MCNC: NEGATIVE MG/DL
LEUKOCYTE ESTERASE UR QL STRIP: ABNORMAL
LIPASE SERPL-CCNC: 26 U/L (ref 11–82)
LYMPHOCYTES # BLD AUTO: 1.1 THOUSANDS/ÂΜL (ref 0.6–4.47)
LYMPHOCYTES NFR BLD AUTO: 14 % (ref 14–44)
MAGNESIUM SERPL-MCNC: 1.6 MG/DL (ref 1.9–2.7)
MCH RBC QN AUTO: 29.2 PG (ref 26.8–34.3)
MCHC RBC AUTO-ENTMCNC: 33.5 G/DL (ref 31.4–37.4)
MCV RBC AUTO: 87 FL (ref 82–98)
MONOCYTES # BLD AUTO: 0.23 THOUSAND/ÂΜL (ref 0.17–1.22)
MONOCYTES NFR BLD AUTO: 3 % (ref 4–12)
NEUTROPHILS # BLD AUTO: 6.26 THOUSANDS/ÂΜL (ref 1.85–7.62)
NEUTS SEG NFR BLD AUTO: 80 % (ref 43–75)
NITRITE UR QL STRIP: NEGATIVE
NON-SQ EPI CELLS URNS QL MICRO: ABNORMAL /HPF
NRBC BLD AUTO-RTO: 0 /100 WBCS
PH UR STRIP.AUTO: 5.5 [PH] (ref 4.5–8)
PLATELET # BLD AUTO: 636 THOUSANDS/UL (ref 149–390)
PMV BLD AUTO: 9.3 FL (ref 8.9–12.7)
POTASSIUM SERPL-SCNC: 3.6 MMOL/L (ref 3.5–5.3)
PROT SERPL-MCNC: 6.8 G/DL (ref 6.4–8.4)
PROT UR STRIP-MCNC: NEGATIVE MG/DL
RBC # BLD AUTO: 4.87 MILLION/UL (ref 3.81–5.12)
RBC #/AREA URNS AUTO: ABNORMAL /HPF
SODIUM SERPL-SCNC: 140 MMOL/L (ref 135–147)
SP GR UR STRIP.AUTO: 1.01 (ref 1–1.03)
UROBILINOGEN UR QL STRIP.AUTO: 0.2 E.U./DL
WBC # BLD AUTO: 7.78 THOUSAND/UL (ref 4.31–10.16)
WBC #/AREA URNS AUTO: ABNORMAL /HPF

## 2023-01-22 RX ORDER — ONDANSETRON 2 MG/ML
4 INJECTION INTRAMUSCULAR; INTRAVENOUS ONCE
Status: COMPLETED | OUTPATIENT
Start: 2023-01-22 | End: 2023-01-22

## 2023-01-22 RX ORDER — KETOROLAC TROMETHAMINE 30 MG/ML
15 INJECTION, SOLUTION INTRAMUSCULAR; INTRAVENOUS ONCE
Status: COMPLETED | OUTPATIENT
Start: 2023-01-22 | End: 2023-01-22

## 2023-01-22 RX ORDER — HYDROMORPHONE HCL/PF 1 MG/ML
0.5 SYRINGE (ML) INJECTION ONCE
Status: COMPLETED | OUTPATIENT
Start: 2023-01-22 | End: 2023-01-22

## 2023-01-22 RX ORDER — BISACODYL 10 MG
10 SUPPOSITORY, RECTAL RECTAL DAILY
Qty: 12 SUPPOSITORY | Refills: 0 | Status: SHIPPED | OUTPATIENT
Start: 2023-01-22

## 2023-01-22 RX ORDER — ONDANSETRON 4 MG/1
4 TABLET, ORALLY DISINTEGRATING ORAL EVERY 6 HOURS PRN
Qty: 20 TABLET | Refills: 0 | Status: SHIPPED | OUTPATIENT
Start: 2023-01-22 | End: 2023-01-24

## 2023-01-22 RX ADMIN — HYDROMORPHONE HYDROCHLORIDE 0.5 MG: 1 INJECTION, SOLUTION INTRAMUSCULAR; INTRAVENOUS; SUBCUTANEOUS at 16:25

## 2023-01-22 RX ADMIN — METHYLNALTREXONE BROMIDE 12 MG: 12 INJECTION, SOLUTION SUBCUTANEOUS at 16:04

## 2023-01-22 RX ADMIN — KETOROLAC TROMETHAMINE 15 MG: 30 INJECTION, SOLUTION INTRAMUSCULAR; INTRAVENOUS at 17:12

## 2023-01-22 RX ADMIN — ONDANSETRON 4 MG: 2 INJECTION INTRAMUSCULAR; INTRAVENOUS at 16:25

## 2023-01-22 RX ADMIN — SODIUM CHLORIDE 1000 ML: 0.9 INJECTION, SOLUTION INTRAVENOUS at 15:46

## 2023-01-22 RX ADMIN — IOHEXOL 100 ML: 350 INJECTION, SOLUTION INTRAVENOUS at 17:59

## 2023-01-22 RX ADMIN — HYOSCYAMINE SULFATE 0.12 MG: 0.12 TABLET SUBLINGUAL at 16:04

## 2023-01-22 NOTE — ED NOTES
Pt reports significant pain relief from recently administered medication  Still rates pain as 9/10 but pt appears more comfortable and is able to converse with nurse  Pt concerned that pain relief will not last - communicated to ED provider  Pt provided with cup for urine sample       Michelle Soto  01/22/23 [id number]

## 2023-01-22 NOTE — TELEPHONE ENCOUNTER
Patient is requesting call back to discuss pain she is having s/p 1/20/2023 procedure  Paged to on call

## 2023-01-22 NOTE — TELEPHONE ENCOUNTER
Return call placed to patient and  after receiving TT from health call  Patient and  not severe pain and that is causing patient to be 'bent over' after drinking prune juice  She also notes requiring oxycodone (which is left over from a prior procedure) since her surgery  I explained this is atypical to require more than OTC pain medication and she needs to be evaluated in the ED

## 2023-01-22 NOTE — ED PROVIDER NOTES
History  Chief Complaint   Patient presents with   • Post-op Problem     Patient had conization on Friday  Patient reports severe lower abdominal pain beginning 1 hour ago  Denies n/v/d     49 YO female presents with abdominal pain  Patient had recent Cold Knife Cone (two days prior)  States she had been taking OTC pain medications after the procedure but supplementing this with oxycodone she had in the house  States this was managing her pain but she had not had a bowel movement since prior to the procedure  She took prune juice this morning and afterward developed a worsening pain in the mid-abdomen  States this has been a stabbing pain, constant, waxing and waning in severity  It has been severe, worse with standing, lying flat, more comfortable with bringing her need up  She denies similar in the past  She has no associated nausea, vomiting or diarrhea  Silvino called the gynecologist and was instructed to present to the ED for evaluation as her pain was not typical for this procedure  History provided by:  Patient   used: No        Prior to Admission Medications   Prescriptions Last Dose Informant Patient Reported? Taking?    LORazepam (ATIVAN) 0 5 mg tablet More than a month  No No   Sig: Take 1 tablet (0 5 mg total) by mouth daily as needed for anxiety   aspirin (ECOTRIN LOW STRENGTH) 81 mg EC tablet Not Taking  Yes No   Sig: Take 81 mg by mouth daily   Patient not taking: Reported on 2023   intrauterine copper (PARAGARD) IUD 2023  Yes Yes   Si each by Intrauterine route once      Facility-Administered Medications: None       Past Medical History:   Diagnosis Date   • Anxiety    • Bacterial vaginosis    • Cancer [facility])     right breast-    3 lumpectomies/reconstruction   • H/O bilateral breast implants    • History of infection due to human papilloma virus (HPV)     last assessed -    • HPV (human papilloma virus) infection     cold knife endometrial curettage  today   1/20/2023   • Hx of radiation therapy    • BALDEMAR-2 gene mutation    • Moderate dysplasia of cervix (EMILI II)     last assessed - [id number]   • Ovarian cyst     last assessed - [id number]   • Pap smear abnormality of cervix with ASCUS favoring dysplasia     last assessed - [id number]   • PONV (postoperative nausea and vomiting)    • Secondary amenorrhea    • Thrombocytosis    • Use of tamoxifen (Nolvadex)     last assessed - [id number]   • Varicella        Past Surgical History:   Procedure Laterality Date   • BREAST LUMPECTOMY Right     last assessed - [id number]   • BREAST LUMPECTOMY Right 05/11/2009   • BREAST LUMPECTOMY Right 06/05/2009   • BREAST RECONSTRUCTION Bilateral 10/2018    bilateral implants   • BREAST RECONSTRUCTION Right 02/2019    right implant   • CERVICAL BIOPSY  W/ LOOP ELECTRODE EXCISION     • COLPOSCOPY W/ BIOPSY / CURETTAGE      Colposcopy Cervix with Biopsy(s) with Endocervical Currettage   • MASTECTOMY, PARTIAL Right 05/11/2009    right partial mastectomy re-excision 6/26/09   • SENTINEL LYMPH NODE BIOPSY Right        Family History   Problem Relation Age of Onset   • No Known Problems Mother    • No Known Problems Father    • No Known Problems Sister    • No Known Problems Maternal Grandmother    • No Known Problems Maternal Grandfather    • Ovarian cancer Paternal Grandmother         age unknown   • No Known Problems Paternal Grandfather    • No Known Problems Maternal Aunt    • Cancer Paternal Aunt 61   • No Known Problems Paternal Aunt    • No Known Problems Paternal Aunt    • Colon polyps Cousin    • Colon cancer Cousin 48   • No Known Problems Half-Sister      I have reviewed and agree with the history as documented      E-Cigarette/Vaping   • E-Cigarette Use Never User      E-Cigarette/Vaping Substances   • Nicotine No    • THC No    • CBD No    • Flavoring No    • Other No    • Unknown No      Social History     Tobacco Use   • Smoking status: Former     Packs/day: 0 25 Years: 26 00     Pack years: 6 50     Types: Cigarettes     Quit date:      Years since quittin 0   • Smokeless tobacco: Never   • Tobacco comments:     former social smoker   Vaping Use   • Vaping Use: Never used   Substance Use Topics   • Alcohol use: Yes     Alcohol/week: 2 0 standard drinks     Types: 2 Cans of beer per week     Comment: 4  x   weekly   • Drug use: No       Review of Systems   Constitutional: Negative for chills, fatigue and fever  HENT: Negative for dental problem  Eyes: Negative for visual disturbance  Respiratory: Negative for shortness of breath  Cardiovascular: Negative for chest pain  Gastrointestinal: Positive for constipation  Negative for abdominal pain, diarrhea and vomiting  Genitourinary: Negative for dysuria and frequency  Musculoskeletal: Negative for arthralgias  Skin: Negative for rash  Neurological: Negative for dizziness, weakness and light-headedness  Psychiatric/Behavioral: Negative for agitation, behavioral problems and confusion  All other systems reviewed and are negative  Physical Exam  Physical Exam  Vitals and nursing note reviewed  Constitutional:       Appearance: Normal appearance  Comments: Uncomfortable  HENT:      Head: Normocephalic and atraumatic  Eyes:      Extraocular Movements: Extraocular movements intact  Conjunctiva/sclera: Conjunctivae normal    Cardiovascular:      Rate and Rhythm: Normal rate and regular rhythm  Pulmonary:      Effort: Pulmonary effort is normal    Abdominal:      General: There is no distension  Musculoskeletal:         General: Normal range of motion  Cervical back: Normal range of motion  Skin:     Findings: No rash  Neurological:      General: No focal deficit present  Mental Status: She is alert  Cranial Nerves: No cranial nerve deficit     Psychiatric:         Mood and Affect: Mood normal          Vital Signs  ED Triage Vitals [23 1520]   Temperature Pulse Respirations Blood Pressure SpO2   97 8 °F (36 6 °C) 95 20 136/64 100 %      Temp Source Heart Rate Source Patient Position - Orthostatic VS BP Location FiO2 (%)   Oral Monitor Sitting Right arm --      Pain Score       10 - Worst Possible Pain           Vitals:    01/22/23 1520 01/22/23 1632 01/22/23 1736 01/22/23 1835   BP: 136/64 134/71 120/65 98/58   Pulse: 95 94 94 95   Patient Position - Orthostatic VS: Sitting Lying Lying Lying         Visual Acuity      ED Medications  Medications   sodium chloride 0 9 % bolus 1,000 mL (0 mL Intravenous Stopped 1/22/23 1704)   methylnaltrexone (RELISTOR) subcutaneous injection 12 mg (12 mg Subcutaneous Given 1/22/23 1604)   hyoscyamine (LEVSIN/SL) SL tablet 0 125 mg (0 125 mg Sublingual Given 1/22/23 1604)   HYDROmorphone (DILAUDID) injection 0 5 mg (0 5 mg Intravenous Given 1/22/23 1625)   ondansetron (ZOFRAN) injection 4 mg (4 mg Intravenous Given 1/22/23 1625)   ketorolac (TORADOL) injection 15 mg (15 mg Intravenous Given 1/22/23 1712)   iohexol (OMNIPAQUE) 350 MG/ML injection (SINGLE-DOSE) 100 mL (100 mL Intravenous Given 1/22/23 1759)       Diagnostic Studies  Results Reviewed     Procedure Component Value Units Date/Time    Urine Microscopic [[id number]]  (Abnormal) Collected: 01/22/23 1745    Lab Status: Final result Specimen: Urine, Clean Catch Updated: 01/22/23 1904     RBC, UA 4-10 /hpf      WBC, UA 2-4 /hpf      Epithelial Cells Occasional /hpf      Bacteria, UA Occasional /hpf     Urine Macroscopic, POC [[id number]]  (Abnormal) Collected: 01/22/23 1745    Lab Status: Final result Specimen: Urine Updated: 01/22/23 1747     Color, UA Yellow     Clarity, UA Clear     pH, UA 5 5     Leukocytes, UA Small     Nitrite, UA Negative     Protein, UA Negative mg/dl      Glucose, UA Negative mg/dl      Ketones, UA Negative mg/dl      Urobilinogen, UA 0 2 E U /dl      Bilirubin, UA Negative     Occult Blood, UA Large     Specific [place], UA 1 015    Narrative: CLINITEK RESULT    Lipase [[id number]]  (Normal) Collected: 01/22/23 1544    Lab Status: Final result Specimen: Blood from Arm, Left Updated: 01/22/23 1617     Lipase 26 u/L     Basic metabolic panel [[id number]] Collected: 01/22/23 1544    Lab Status: Final result Specimen: Blood from Arm, Left Updated: 01/22/23 1617     Sodium 140 mmol/L      Potassium 3 6 mmol/L      Chloride 105 mmol/L      CO2 26 mmol/L      ANION GAP 9 mmol/L      BUN 13 mg/dL      Creatinine 0 88 mg/dL      Glucose 98 mg/dL      Calcium 9 6 mg/dL      eGFR 76 ml/min/1 73sq m     Narrative:      [place] guidelines for Chronic Kidney Disease (CKD):   •  Stage 1 with normal or high GFR (GFR > 90 mL/min/1 73 square meters)  •  Stage 2 Mild CKD (GFR = 60-89 mL/min/1 73 square meters)  •  Stage 3A Moderate CKD (GFR = 45-59 mL/min/1 73 square meters)  •  Stage 3B Moderate CKD (GFR = 30-44 mL/min/1 73 square meters)  •  Stage 4 Severe CKD (GFR = 15-29 mL/min/1 73 square meters)  •  Stage 5 End Stage CKD (GFR <15 mL/min/1 73 square meters)  Note: GFR calculation is accurate only with a steady state creatinine    Hepatic function panel [[id number]]  (Normal) Collected: 01/22/23 1544    Lab Status: Final result Specimen: Blood from Arm, Left Updated: 01/22/23 1617     Total Bilirubin 0 91 mg/dL      Bilirubin, Direct 0 19 mg/dL      Alkaline Phosphatase 52 U/L      AST 14 U/L      ALT 12 U/L      Total Protein 6 8 g/dL      Albumin 4 3 g/dL     Magnesium [[id number]]  (Abnormal) Collected: 01/22/23 1544    Lab Status: Final result Specimen: Blood from Arm, Left Updated: 01/22/23 1617     Magnesium 1 6 mg/dL     CBC and differential [[id number]]  (Abnormal) Collected: 01/22/23 1544    Lab Status: Final result Specimen: Blood from Arm, Left Updated: 01/22/23 1601     WBC 7 78 Thousand/uL      RBC 4 87 Million/uL      Hemoglobin 14 2 g/dL      Hematocrit 42 4 %      MCV 87 fL      MCH 29 2 pg      MCHC 33 5 g/dL      RDW 14 3 % MPV 9 3 fL      Platelets 451 Thousands/uL      nRBC 0 /100 WBCs      Neutrophils Relative 80 %      Immat GRANS % 0 %      Lymphocytes Relative 14 %      Monocytes Relative 3 %      Eosinophils Relative 2 %      Basophils Relative 1 %      Neutrophils Absolute 6 26 Thousands/µL      Immature Grans Absolute 0 02 Thousand/uL      Lymphocytes Absolute 1 10 Thousands/µL      Monocytes Absolute 0 23 Thousand/µL      Eosinophils Absolute 0 12 Thousand/µL      Basophils Absolute 0 05 Thousands/µL                  CT abdomen pelvis with contrast   Final Result by Celina Green MD (01/22 1376)   1  Suspected bladder cystitis  Correlate with urinalysis  2   Mild constipation  3   Marked splenomegaly and perisplenic varices noted  Workstation performed: [id number]         XR abdomen obstruction series   ED Interpretation by Thaddeus Stout MD (01/22 1606)   Normal                 Procedures  Procedures         ED Course  ED Course as of 01/22/23 2224   Sun Jan 22, 2023   1606 X-ray(s) personally evaluated, interpretation: Obstruction series displays a non-obstructive bowel-gas pattern  [id number] Patient continues with pain, vomiting  Will give additional analgesia, antiemetic  [id number] Patient with improved discomfort  Will discharge with Bisacodyl suppositories, antiemetics  Discussed findings of questionable cystitis  Patient has no symptoms, will wait for cultures  Did discuss finding of splenomegaly and need for follow up  SBIRT 22yo+    Flowsheet Row Most Recent Value   SBIRT (25 yo +)    In order to provide better care to our patients, we are screening all of our patients for alcohol and drug use  Would it be okay to ask you these screening questions? No Filed at: 01/22/2023 3794                    Medical Decision Making  1  Abdominal pain - Patient with pain after gynecologic procedure   Will check CBC for leukocytosis, metabolic panel for electrolyte abnormalities and dehydration, will obtain imaging, likely start with obstruction series but possibly assess with CT abdoemna dn pelvis  Will give fluids, try Levsin for bowel spasms and give dose of Relistor for possible opioid induced constipation  Abdominal pain: undiagnosed new problem with uncertain prognosis  Constipation: acute illness or injury  Nausea: acute illness or injury  Splenomegaly: undiagnosed new problem with uncertain prognosis  Amount and/or Complexity of Data Reviewed  Independent Historian: spouse  External Data Reviewed: notes  Details: Reviewed notes regarding recent gyneclogic procedure  Labs: ordered  Radiology: ordered and independent interpretation performed  Decision-making details documented in ED Course  Risk  OTC drugs  Prescription drug management  Parenteral controlled substances  Disposition  Final diagnoses:   Abdominal pain   Splenomegaly   Constipation   Nausea     Time reflects when diagnosis was documented in both MDM as applicable and the Disposition within this note     Time User Action Codes Description Comment    1/22/2023  6:55 PM Burnadette Jabs E Add [R10 9] Abdominal pain     1/22/2023  6:55 PM Madalyn Kendeo, [address] [R16 1] Splenomegaly     1/22/2023  6:55 PM Burnadette Jabs E Add [K59 00] Constipation     1/22/2023  6:57 PM Burnadette Jabs E Add [R11 0] Nausea       ED Disposition     ED Disposition   Discharge    Condition   Stable    Date/Time   Sun Jan 22, 2023  6:55 PM    Comment   [facility] discharge to home/self care                 Follow-up Information     Follow up With Specialties Details Why Contact Info    Patrisha Dandy, MD Internal Medicine   [address]  [phone]            Discharge Medication List as of 1/22/2023  7:00 PM      START taking these medications    Details   bisacodyl (DULCOLAX) 10 mg suppository Insert 1 suppository (10 mg total) into the rectum daily, Starting Sun 1/22/2023, Normal ondansetron (ZOFRAN-ODT) 4 mg disintegrating tablet Take 1 tablet (4 mg total) by mouth every 6 (six) hours as needed for nausea, Starting Sun 1/22/2023, Normal         CONTINUE these medications which have NOT CHANGED    Details   intrauterine copper (PARAGARD) IUD 1 each by Intrauterine route once, Historical Med      aspirin (ECOTRIN LOW STRENGTH) 81 mg EC tablet Take 81 mg by mouth daily, Historical Med      LORazepam (ATIVAN) 0 5 mg tablet Take 1 tablet (0 5 mg total) by mouth daily as needed for anxiety, Starting Fri 2/25/2022, Normal             No discharge procedures on file      PDMP Review       Value Time User    PDMP Reviewed  Yes 2/25/2022  1:36 PM Piter Boswell MD          ED Provider  Electronically Signed by           Chirssy Gasca MD  01/22/23 2659

## 2023-01-23 NOTE — DISCHARGE INSTRUCTIONS
Take the suppositories to help with your constipation, Metamucil may also help  Take the zofran as needed for nausea, this can be placed under the tongue to dissolve if you are vomiting  Call your gynecologist to let them know about your visit, they can look through the records to determine if any further evaluation needs to be done  You should discuss the finding of enlarged spleen with your family doctor and your hematologist, there is likely further evaluation necessary

## 2023-01-24 ENCOUNTER — APPOINTMENT (EMERGENCY)
Dept: CT IMAGING | Facility: HOSPITAL | Age: 53
End: 2023-01-24

## 2023-01-24 ENCOUNTER — APPOINTMENT (EMERGENCY)
Dept: RADIOLOGY | Facility: HOSPITAL | Age: 53
End: 2023-01-24

## 2023-01-24 ENCOUNTER — HOSPITAL ENCOUNTER (INPATIENT)
Facility: HOSPITAL | Age: 53
LOS: 6 days | Discharge: HOME | End: 2023-01-30
Attending: EMERGENCY MEDICINE | Admitting: OBSTETRICS & GYNECOLOGY

## 2023-01-24 ENCOUNTER — OFFICE VISIT (OUTPATIENT)
Dept: FAMILY MEDICINE CLINIC | Facility: CLINIC | Age: 53
End: 2023-01-24

## 2023-01-24 ENCOUNTER — TELEPHONE (OUTPATIENT)
Dept: GYNECOLOGIC ONCOLOGY | Facility: CLINIC | Age: 53
End: 2023-01-24

## 2023-01-24 VITALS
OXYGEN SATURATION: 99 % | HEIGHT: 63 IN | HEART RATE: 93 BPM | WEIGHT: 116 LBS | RESPIRATION RATE: 16 BRPM | BODY MASS INDEX: 20.55 KG/M2 | TEMPERATURE: 97.3 F | DIASTOLIC BLOOD PRESSURE: 72 MMHG | SYSTOLIC BLOOD PRESSURE: 100 MMHG

## 2023-01-24 DIAGNOSIS — N73.9 PELVIC ABSCESS IN FEMALE: ICD-10-CM

## 2023-01-24 DIAGNOSIS — J90 PLEURAL EFFUSION: ICD-10-CM

## 2023-01-24 DIAGNOSIS — R79.89 ELEVATED PROCALCITONIN: ICD-10-CM

## 2023-01-24 DIAGNOSIS — R18.8 ASCITES: ICD-10-CM

## 2023-01-24 DIAGNOSIS — R93.89 ABNORMAL CT SCAN: ICD-10-CM

## 2023-01-24 DIAGNOSIS — L03.319 CELLULITIS OF TRUNK, UNSPECIFIED SITE OF TRUNK: ICD-10-CM

## 2023-01-24 DIAGNOSIS — R11.2 INTRACTABLE VOMITING WITH NAUSEA: ICD-10-CM

## 2023-01-24 DIAGNOSIS — R18.8 PELVIC FLUID COLLECTION: ICD-10-CM

## 2023-01-24 DIAGNOSIS — R10.9 ABDOMINAL PAIN: Primary | ICD-10-CM

## 2023-01-24 DIAGNOSIS — K56.609 SBO (SMALL BOWEL OBSTRUCTION) (HCC): ICD-10-CM

## 2023-01-24 DIAGNOSIS — K56.7 ILEUS (HCC): ICD-10-CM

## 2023-01-24 DIAGNOSIS — R10.0 ACUTE ABDOMEN: Primary | ICD-10-CM

## 2023-01-24 PROBLEM — K91.89 POSTOPERATIVE ILEUS (HCC): Status: ACTIVE | Noted: 2023-01-24

## 2023-01-24 PROBLEM — Z98.890 S/P CONE BIOPSY OF CERVIX: Status: ACTIVE | Noted: 2023-01-24

## 2023-01-24 LAB
ABO GROUP BLD: NORMAL
ALBUMIN SERPL BCP-MCNC: 3.9 G/DL (ref 3.5–5)
ALP SERPL-CCNC: 65 U/L (ref 34–104)
ALT SERPL W P-5'-P-CCNC: 9 U/L (ref 7–52)
ANION GAP SERPL CALCULATED.3IONS-SCNC: 12 MMOL/L (ref 4–13)
APTT PPP: 34 SECONDS (ref 23–37)
AST SERPL W P-5'-P-CCNC: 20 U/L (ref 13–39)
BASOPHILS # BLD AUTO: 0.05 THOUSANDS/ÂΜL (ref 0–0.1)
BASOPHILS NFR BLD AUTO: 0 % (ref 0–1)
BILIRUB SERPL-MCNC: 1.87 MG/DL (ref 0.2–1)
BLD GP AB SCN SERPL QL: NEGATIVE
BUN SERPL-MCNC: 10 MG/DL (ref 5–25)
CALCIUM SERPL-MCNC: 9.2 MG/DL (ref 8.4–10.2)
CHLORIDE SERPL-SCNC: 100 MMOL/L (ref 96–108)
CO2 SERPL-SCNC: 22 MMOL/L (ref 21–32)
CREAT SERPL-MCNC: 0.75 MG/DL (ref 0.6–1.3)
EOSINOPHIL # BLD AUTO: 0.06 THOUSAND/ÂΜL (ref 0–0.61)
EOSINOPHIL NFR BLD AUTO: 1 % (ref 0–6)
ERYTHROCYTE [DISTWIDTH] IN BLOOD BY AUTOMATED COUNT: 14.1 % (ref 11.6–15.1)
GFR SERPL CREATININE-BSD FRML MDRD: 93 ML/MIN/1.73SQ M
GLUCOSE SERPL-MCNC: 102 MG/DL (ref 65–140)
HCT VFR BLD AUTO: 36.9 % (ref 34.8–46.1)
HGB BLD-MCNC: 12.2 G/DL (ref 11.5–15.4)
IMM GRANULOCYTES # BLD AUTO: 0.1 THOUSAND/UL (ref 0–0.2)
IMM GRANULOCYTES NFR BLD AUTO: 1 % (ref 0–2)
INR PPP: 1.44 (ref 0.84–1.19)
LACTATE SERPL-SCNC: 1 MMOL/L (ref 0.5–2)
LYMPHOCYTES # BLD AUTO: 0.93 THOUSANDS/ÂΜL (ref 0.6–4.47)
LYMPHOCYTES NFR BLD AUTO: 7 % (ref 14–44)
MAGNESIUM SERPL-MCNC: 2.1 MG/DL (ref 1.9–2.7)
MCH RBC QN AUTO: 28.6 PG (ref 26.8–34.3)
MCHC RBC AUTO-ENTMCNC: 33.1 G/DL (ref 31.4–37.4)
MCV RBC AUTO: 87 FL (ref 82–98)
MONOCYTES # BLD AUTO: 0.55 THOUSAND/ÂΜL (ref 0.17–1.22)
MONOCYTES NFR BLD AUTO: 4 % (ref 4–12)
NEUTROPHILS # BLD AUTO: 11.01 THOUSANDS/ÂΜL (ref 1.85–7.62)
NEUTS SEG NFR BLD AUTO: 87 % (ref 43–75)
NRBC BLD AUTO-RTO: 0 /100 WBCS
PLATELET # BLD AUTO: 611 THOUSANDS/UL (ref 149–390)
PMV BLD AUTO: 9.9 FL (ref 8.9–12.7)
POTASSIUM SERPL-SCNC: 4.1 MMOL/L (ref 3.5–5.3)
PROCALCITONIN SERPL-MCNC: 5.51 NG/ML
PROT SERPL-MCNC: 7 G/DL (ref 6.4–8.4)
PROTHROMBIN TIME: 17.5 SECONDS (ref 11.6–14.5)
RBC # BLD AUTO: 4.26 MILLION/UL (ref 3.81–5.12)
RH BLD: POSITIVE
SODIUM SERPL-SCNC: 134 MMOL/L (ref 135–147)
SPECIMEN EXPIRATION DATE: NORMAL
WBC # BLD AUTO: 12.7 THOUSAND/UL (ref 4.31–10.16)

## 2023-01-24 RX ORDER — LIDOCAINE HYDROCHLORIDE 20 MG/ML
15 SOLUTION OROPHARYNGEAL 4 TIMES DAILY PRN
Status: DISCONTINUED | OUTPATIENT
Start: 2023-01-24 | End: 2023-01-25

## 2023-01-24 RX ORDER — ONDANSETRON 2 MG/ML
4 INJECTION INTRAMUSCULAR; INTRAVENOUS EVERY 6 HOURS PRN
Status: COMPLETED | OUTPATIENT
Start: 2023-01-24 | End: 2023-01-25

## 2023-01-24 RX ORDER — HYDROMORPHONE HCL/PF 1 MG/ML
0.5 SYRINGE (ML) INJECTION
Status: DISCONTINUED | OUTPATIENT
Start: 2023-01-24 | End: 2023-01-24

## 2023-01-24 RX ORDER — ACETAMINOPHEN 325 MG/1
975 TABLET ORAL EVERY 6 HOURS PRN
Status: DISCONTINUED | OUTPATIENT
Start: 2023-01-24 | End: 2023-01-25

## 2023-01-24 RX ORDER — DEXTROSE AND SODIUM CHLORIDE 5; .45 G/100ML; G/100ML
125 INJECTION, SOLUTION INTRAVENOUS CONTINUOUS
Status: DISCONTINUED | OUTPATIENT
Start: 2023-01-24 | End: 2023-01-25

## 2023-01-24 RX ORDER — OXYCODONE HYDROCHLORIDE 5 MG/1
5 TABLET ORAL EVERY 4 HOURS PRN
Status: DISCONTINUED | OUTPATIENT
Start: 2023-01-24 | End: 2023-01-24

## 2023-01-24 RX ORDER — LEVOFLOXACIN 5 MG/ML
750 INJECTION, SOLUTION INTRAVENOUS EVERY 24 HOURS
Status: DISCONTINUED | OUTPATIENT
Start: 2023-01-24 | End: 2023-01-28

## 2023-01-24 RX ORDER — IBUPROFEN 600 MG/1
600 TABLET ORAL EVERY 6 HOURS PRN
Status: DISCONTINUED | OUTPATIENT
Start: 2023-01-24 | End: 2023-01-25

## 2023-01-24 RX ORDER — ONDANSETRON 2 MG/ML
4 INJECTION INTRAMUSCULAR; INTRAVENOUS ONCE
Status: COMPLETED | OUTPATIENT
Start: 2023-01-24 | End: 2023-01-24

## 2023-01-24 RX ORDER — OXYCODONE HYDROCHLORIDE 5 MG/1
5 TABLET ORAL EVERY 4 HOURS PRN
Status: DISCONTINUED | OUTPATIENT
Start: 2023-01-24 | End: 2023-01-25

## 2023-01-24 RX ORDER — HYDROMORPHONE HCL/PF 1 MG/ML
0.5 SYRINGE (ML) INJECTION ONCE
Status: COMPLETED | OUTPATIENT
Start: 2023-01-24 | End: 2023-01-24

## 2023-01-24 RX ORDER — HYDROMORPHONE HCL/PF 1 MG/ML
0.5 SYRINGE (ML) INJECTION
Status: COMPLETED | OUTPATIENT
Start: 2023-01-24 | End: 2023-01-25

## 2023-01-24 RX ORDER — HYDROMORPHONE HCL/PF 1 MG/ML
1 SYRINGE (ML) INJECTION ONCE
Status: COMPLETED | OUTPATIENT
Start: 2023-01-24 | End: 2023-01-24

## 2023-01-24 RX ORDER — METRONIDAZOLE 500 MG/100ML
500 INJECTION, SOLUTION INTRAVENOUS EVERY 8 HOURS
Status: DISCONTINUED | OUTPATIENT
Start: 2023-01-24 | End: 2023-01-28

## 2023-01-24 RX ADMIN — IOHEXOL 100 ML: 350 INJECTION, SOLUTION INTRAVENOUS at 18:19

## 2023-01-24 RX ADMIN — ONDANSETRON 4 MG: 2 INJECTION INTRAMUSCULAR; INTRAVENOUS at 22:54

## 2023-01-24 RX ADMIN — LIDOCAINE HYDROCHLORIDE 15 ML: 20 SOLUTION ORAL; TOPICAL at 22:05

## 2023-01-24 RX ADMIN — METRONIDAZOLE 500 MG: 500 INJECTION, SOLUTION INTRAVENOUS at 23:50

## 2023-01-24 RX ADMIN — HYDROMORPHONE HYDROCHLORIDE 0.5 MG: 1 INJECTION, SOLUTION INTRAMUSCULAR; INTRAVENOUS; SUBCUTANEOUS at 17:51

## 2023-01-24 RX ADMIN — ONDANSETRON 4 MG: 2 INJECTION INTRAMUSCULAR; INTRAVENOUS at 17:50

## 2023-01-24 RX ADMIN — HYDROMORPHONE HYDROCHLORIDE 1 MG: 1 INJECTION, SOLUTION INTRAMUSCULAR; INTRAVENOUS; SUBCUTANEOUS at 19:05

## 2023-01-24 RX ADMIN — HYDROMORPHONE HYDROCHLORIDE 0.5 MG: 1 INJECTION, SOLUTION INTRAMUSCULAR; INTRAVENOUS; SUBCUTANEOUS at 22:05

## 2023-01-24 RX ADMIN — SODIUM CHLORIDE 1000 ML: 0.9 INJECTION, SOLUTION INTRAVENOUS at 20:09

## 2023-01-24 RX ADMIN — LEVOFLOXACIN 750 MG: 5 INJECTION, SOLUTION INTRAVENOUS at 22:16

## 2023-01-24 RX ADMIN — SODIUM CHLORIDE 1000 ML: 0.9 INJECTION, SOLUTION INTRAVENOUS at 17:50

## 2023-01-24 RX ADMIN — DEXTROSE AND SODIUM CHLORIDE 125 ML/HR: 5; .45 INJECTION, SOLUTION INTRAVENOUS at 23:15

## 2023-01-24 NOTE — PROGRESS NOTES
Assessment/Plan:           Problem List Items Addressed This Visit    None  Visit Diagnoses     Acute abdomen    -  Primary    Relevant Orders    Transfer to other facility    [facility])        Relevant Orders    Transfer to other facility    Intractable vomiting with nausea            I spoke with ED Jose upon patient's status  Clinical suspicion of ileus rule out obstruction  Subjective:      Patient ID: Mary Wyatt is a 50 y o  female  HPI  Is here for an acute visit complaining of acute abdominal pain going on for the last 4 days prompting her to go to the ED 2 days ago  Patient recently had a cervical cone procedure about 4 days ago  He has been taking oxycodone to help with the discomfort  The course was reviewed  CBC count showed increased platelet count patient does have history of essential thrombocytosis  CT scan of the abdomen and pelvis showed marked splenomegaly and mild constipation was noted  Patient had not passed flatus  She did use a suppository and had some liquid stool  She had been vomiting last 4 days not been able to keep liquids down  She did have some crackers this morning  Her liver feels to be distended on exam today  Tender to percuss  The following portions of the patient's history were reviewed and updated as appropriate: allergies, current medications, past medical history, past social history, past surgical history and problem list     Review of Systems      Objective:      /72 (BP Location: Left arm, Patient Position: Sitting, Cuff Size: Standard)   Pulse 93   Temp (!) 97 3 °F (36 3 °C) (Tympanic)   Resp 16   Ht 5' 3" (1 6 m)   Wt 52 6 kg (116 lb)   SpO2 99%   BMI 20 55 kg/m²          Physical Exam  Constitutional:       General: She is in acute distress (Very uncomfortable and is unable to sit still)  Appearance: She is ill-appearing  Abdominal:      General: There is distension  Tenderness: There is abdominal tenderness  There is guarding  Comments: Hyperactive   Neurological:      Mental Status: She is alert

## 2023-01-24 NOTE — ED PROVIDER NOTES
History  Chief Complaint   Patient presents with   • Abdominal Pain     Pt reports abd pain, vomiting and constipation  Reports recent surgery  Patient is a 53-year-old female with a history of right-sided breast cancer in 2008 status postlumpectomy and reconstruction, thrombocytosis, as well as HPV recently with cold knife endomedial curettage  She states on Friday, January 20 has of this year she had aCold knife cone done here at [address]  He states on Friday and Saturday he was doing well but then started having worsening pain  She was taking oxycodone that she had in her house  They said everything was good until Sunday  She took prune juice had worsening pain  She describes it as a diffuse constant stabbing cramping severe pressure pain  Came to the ER and was discharged home  She did try an enema  States that since that time even on Sunday she had eat a banana or crackers and an hour later vomited up  She has not passed gas or had a bowel movement since Sunday  She was seen in the office and sent in for further evaluation      In chart review patient did have a diagnosed of high-grade squamous intraepithelial lesion (HGSIL), grade 3 EMILI,       History provided by:  Patient   used: No    Abdominal Pain  Pain location:  Generalized  Pain quality: aching, cramping, fullness, gnawing, pressure, sharp and stabbing    Pain radiates to:  Does not radiate  Pain severity:  Moderate  Onset quality:  Gradual  Timing:  Constant  Progression:  Worsening  Chronicity:  Recurrent  Context: previous surgery    Context: not alcohol use, not awakening from sleep, not diet changes, not eating, not laxative use, not medication withdrawal, not recent illness, not recent sexual activity, not recent travel, not retching, not sick contacts, not suspicious food intake and not trauma    Relieved by:  Nothing  Worsened by:  Nothing  Associated symptoms: anorexia, nausea and vomiting Associated symptoms: no belching, no chest pain, no chills, no constipation, no cough, no diarrhea, no dysuria, no fatigue, no fever, no flatus, no hematemesis, no hematochezia, no hematuria, no melena, no shortness of breath, no sore throat, no vaginal bleeding and no vaginal discharge    Risk factors: no alcohol abuse, no aspirin use, not elderly, has not had multiple surgeries, no NSAID use, not obese and no recent hospitalization        Prior to Admission Medications   Prescriptions Last Dose Informant Patient Reported? Taking?    LORazepam (ATIVAN) 0 5 mg tablet   No No   Sig: Take 1 tablet (0 5 mg total) by mouth daily as needed for anxiety   aspirin (ECOTRIN LOW STRENGTH) 81 mg EC tablet   Yes No   Sig: Take 81 mg by mouth daily   Patient not taking: Reported on 2023   bisacodyl (DULCOLAX) 10 mg suppository   No No   Sig: Insert 1 suppository (10 mg total) into the rectum daily   intrauterine copper (PARAGARD) IUD   Yes No   Si each by Intrauterine route once   ondansetron (ZOFRAN-ODT) 4 mg disintegrating tablet   No No   Sig: Take 1 tablet (4 mg total) by mouth every 6 (six) hours as needed for nausea   Patient not taking: Reported on 2023      Facility-Administered Medications: None       Past Medical History:   Diagnosis Date   • Anxiety    • Bacterial vaginosis    • Cancer [facility])     right breast-    3 lumpectomies/reconstruction   • H/O bilateral breast implants    • History of infection due to human papilloma virus (HPV)     last assessed - [id number]   • HPV (human papilloma virus) infection     cold knife endometrial curettage  today   2023   • Hx of radiation therapy    • BALDEMAR-2 gene mutation    • Moderate dysplasia of cervix (EMILI II)     last assessed - [id number]   • Ovarian cyst     last assessed - [id number]   • Pap smear abnormality of cervix with ASCUS favoring dysplasia     last assessed - [id number]   • PONV (postoperative nausea and vomiting)    • Secondary amenorrhea    • Thrombocytosis    • Use of tamoxifen (Nolvadex)     last assessed - [id number]   • Varicella        Past Surgical History:   Procedure Laterality Date   • BREAST LUMPECTOMY Right     last assessed - [id number]   • BREAST LUMPECTOMY Right 2009   • BREAST LUMPECTOMY Right 2009   • BREAST RECONSTRUCTION Bilateral 10/2018    bilateral implants   • BREAST RECONSTRUCTION Right 2019    right implant   • CERVICAL BIOPSY N/A 2023    Procedure: COLD KNIFE CONE, ENDOMETRIAL CURETTAGE;  Surgeon: Alejandro Hope MD;  Location: [facility];  Service: Gynecology Oncology   • CERVICAL BIOPSY  W/ LOOP ELECTRODE EXCISION     • COLPOSCOPY W/ BIOPSY / CURETTAGE      Colposcopy Cervix with Biopsy(s) with Endocervical Currettage   • MASTECTOMY, PARTIAL Right 2009    right partial mastectomy re-excision 09   • SENTINEL LYMPH NODE BIOPSY Right        Family History   Problem Relation Age of Onset   • No Known Problems Mother    • No Known Problems Father    • No Known Problems Sister    • No Known Problems Maternal Grandmother    • No Known Problems Maternal Grandfather    • Ovarian cancer Paternal Grandmother         age unknown   • No Known Problems Paternal Grandfather    • No Known Problems Maternal Aunt    • Cancer Paternal Aunt 61   • No Known Problems Paternal Aunt    • No Known Problems Paternal Aunt    • Colon polyps Cousin    • Colon cancer Cousin 48   • No Known Problems Half-Sister      I have reviewed and agree with the history as documented      E-Cigarette/Vaping   • E-Cigarette Use Never User      E-Cigarette/Vaping Substances   • Nicotine No    • THC No    • CBD No    • Flavoring No    • Other No    • Unknown No      Social History     Tobacco Use   • Smoking status: Former     Packs/day: 0 25     Years: 26 00     Pack years: 6 50     Types: Cigarettes     Quit date:      Years since quittin 0   • Smokeless tobacco: Never   • Tobacco comments:     former social smoker   Vaping Use   • Vaping Use: Never used   Substance Use Topics   • Alcohol use: Yes     Alcohol/week: 2 0 standard drinks     Types: 2 Cans of beer per week     Comment: 4  x   weekly   • Drug use: No       Review of Systems   Constitutional: Negative  Negative for chills, fatigue and fever  HENT: Negative for ear pain and sore throat  Eyes: Negative for pain and visual disturbance  Respiratory: Negative  Negative for cough and shortness of breath  Cardiovascular: Negative for chest pain and palpitations  Gastrointestinal: Positive for abdominal pain, anorexia, nausea and vomiting  Negative for constipation, diarrhea, flatus, hematemesis, hematochezia and melena  Genitourinary: Negative  Negative for dysuria, hematuria, vaginal bleeding and vaginal discharge  Musculoskeletal: Negative  Negative for arthralgias and back pain  Skin: Negative  Negative for color change and rash  Neurological: Negative  Negative for seizures and syncope  Hematological: Negative  Psychiatric/Behavioral: Negative  All other systems reviewed and are negative  Physical Exam  Physical Exam  Vitals and nursing note reviewed  Constitutional:       General: She is not in acute distress  Appearance: She is well-developed  Comments: Patient appears in pain   HENT:      Head: Normocephalic and atraumatic  Comments: Patient maintaining airway and secretions  No stridor   No brawniness under tongue  Mouth/Throat:      Mouth: Mucous membranes are moist    Eyes:      Extraocular Movements: Extraocular movements intact  Conjunctiva/sclera: Conjunctivae normal       Pupils: Pupils are equal, round, and reactive to light  Cardiovascular:      Rate and Rhythm: Regular rhythm  Tachycardia present  Pulses:           Radial pulses are 2+ on the right side and 2+ on the left side  Dorsalis pedis pulses are 2+ on the left side        Heart sounds: Normal heart sounds, S1 normal and S2 normal  No murmur heard  Pulmonary:      Effort: Pulmonary effort is normal  No respiratory distress  Breath sounds: Normal breath sounds  Abdominal:      General: Bowel sounds are increased  There is distension  Palpations: Abdomen is soft  Tenderness: There is generalized abdominal tenderness  There is guarding  Musculoskeletal:         General: No swelling  Cervical back: Neck supple  Right lower leg: No edema  Left lower leg: No edema  Skin:     General: Skin is warm and dry  Capillary Refill: Capillary refill takes less than 2 seconds  Neurological:      General: No focal deficit present  Mental Status: She is alert and oriented to person, place, and time  GCS: GCS eye subscore is 4  GCS verbal subscore is 5  GCS motor subscore is 6  Cranial Nerves: Cranial nerves 2-12 are intact  Sensory: Sensation is intact  Motor: Motor function is intact  Coordination: Coordination is intact  Psychiatric:         Mood and Affect: Mood normal          Behavior: Behavior normal          Vital Signs  ED Triage Vitals [01/24/23 1547]   Temperature Pulse Respirations Blood Pressure SpO2   98 1 °F (36 7 °C) 101 18 130/72 99 %      Temp Source Heart Rate Source Patient Position - Orthostatic VS BP Location FiO2 (%)   Oral Monitor -- Right arm --      Pain Score       10 - Worst Possible Pain           Vitals:    01/24/23 1547   BP: 130/72   Pulse: 101         Visual Acuity      ED Medications  Medications - No data to display    Diagnostic Studies  Results Reviewed     None                 No orders to display              Procedures  Procedures         ED Course  ED Course as of 01/24/23 2201 Tue Jan 24, 2023   1724 For evaluation I did reach out to Dr Vinicio Licona  Discussed case at length  After evaluation I did discuss with patient as well  We will obtain labs, IV, IV fluids Zofran pain medication    Cussed with her NG tube to help with decompression which she is agreeable for as well as CT  On exam she is fusilli tender and guarding with abdominal distention  Disclosure: Voice to text software was used in the preparation of this document and could have resulted in translational errors       Occasional wrong word or "sound a like" substitutions may have occurred due to the inherent limitations of voice recognition software   Read the chart carefully and recognize, using context, where substitutions have occurred         1811 Patient with mild leukocytosis  Platelet count does remain elevated  No anemia     1846 Patient updated on labs  She does have elevated Pro-Velasquez as well as INR  G-tube is in place and she states it is not comfortable  No Quenemo removed from NG tube  Will give dose of Dilaudid while waiting CT scan   1938 CT with significant findings  We will reach out to general surgery and GYN for further discussion   1947 Discussed case with Gen Surgery resident and OB/Gyn resident   GYN will admit   5 Gen Surgery resident at bedside Long discussion with patient and family regarding work-up and CT scan findings  Patient is resting in bed and appears more comfortable  She still has diffuse abdominal tenderness mild guarding  He can lay flat at this time  NG tube in place  Answered questions at bedside   2121 Patient admitted  Discussed with Dr Aubrie Prince -there was concern of abnormal fluid collection posterior to the uterus                                               Medical Decision Making      Differential diagnosis includes but not limited to:  Appendicitis, viral syndrome, constipation, AMI, NSTEMI, pneumonia, pneuothorax, gerd, gastritis,  mesenteric ischemia, mesenteric adenitis, pancreatitis, cholecystitis, choledocholithiasis, hepatitis, bowel obstruction, ileus, gastroenteritis, colitis, malignancy, AAA, perforation, toxicologic poisoning, renal infarct, acute kidney injury, splenic infarct, splenic injury, nephrolithiasis, UTI, muscular strain, intra-abdominal hematoma, hernia, ovarian cyst, ovarian torsion, ectal prolapse, pain no rectal fistula, a no rectal fissures, hemorrhoids, perirectal abscess,      Amount and/or Complexity of Data Reviewed  Labs: ordered  Radiology: ordered  Risk  Prescription drug management  Disposition  Final diagnoses:   None     ED Disposition     None      Follow-up Information    None         Patient's Medications   Discharge Prescriptions    No medications on file       No discharge procedures on file      PDMP Review       Value Time User    PDMP Reviewed  Yes 2/25/2022  1:36 PM Hoang Yañez MD          ED Provider  Electronically Signed by           Soren Bush DO  01/29/23 1541

## 2023-01-24 NOTE — ED NOTES
Patient transported to [address] [place] [address] [place], [facility]  01/24/23 0007

## 2023-01-24 NOTE — TELEPHONE ENCOUNTER
Patient called into the office after her visit with the [facility] ED on 1/22  Patient states that she is not able to keep any type of food or really anything down  Patient stopped all medications and is still feeling sick this morning   Patient is unsure of what to do and would like a phone call back from the office @ [phone]    Noted* Patient had surgery with AG on 1/20

## 2023-01-25 ENCOUNTER — ANESTHESIA (INPATIENT)
Dept: PERIOP | Facility: HOSPITAL | Age: 53
End: 2023-01-25

## 2023-01-25 ENCOUNTER — ANESTHESIA EVENT (INPATIENT)
Dept: PERIOP | Facility: HOSPITAL | Age: 53
End: 2023-01-25

## 2023-01-25 LAB
ABO GROUP BLD: NORMAL
ABO GROUP BLD: NORMAL
ANION GAP SERPL CALCULATED.3IONS-SCNC: 7 MMOL/L (ref 4–13)
BACTERIA UR QL AUTO: ABNORMAL /HPF
BILIRUB UR QL STRIP: NEGATIVE
BLD GP AB SCN SERPL QL: NEGATIVE
BUN SERPL-MCNC: 9 MG/DL (ref 5–25)
CALCIUM SERPL-MCNC: 8.2 MG/DL (ref 8.4–10.2)
CHLORIDE SERPL-SCNC: 106 MMOL/L (ref 96–108)
CLARITY UR: ABNORMAL
CO2 SERPL-SCNC: 22 MMOL/L (ref 21–32)
COLOR UR: ABNORMAL
CREAT SERPL-MCNC: 0.56 MG/DL (ref 0.6–1.3)
ERYTHROCYTE [DISTWIDTH] IN BLOOD BY AUTOMATED COUNT: 14.3 % (ref 11.6–15.1)
FINE GRAN CASTS URNS QL MICRO: ABNORMAL /LPF
FLUAV RNA RESP QL NAA+PROBE: NEGATIVE
FLUBV RNA RESP QL NAA+PROBE: NEGATIVE
GFR SERPL CREATININE-BSD FRML MDRD: 109 ML/MIN/1.73SQ M
GLUCOSE SERPL-MCNC: 108 MG/DL (ref 65–140)
GLUCOSE UR STRIP-MCNC: NEGATIVE MG/DL
HCT VFR BLD AUTO: 32.2 % (ref 34.8–46.1)
HGB BLD-MCNC: 10.5 G/DL (ref 11.5–15.4)
HGB UR QL STRIP.AUTO: ABNORMAL
KETONES UR STRIP-MCNC: ABNORMAL MG/DL
LEUKOCYTE ESTERASE UR QL STRIP: NEGATIVE
MCH RBC QN AUTO: 28.5 PG (ref 26.8–34.3)
MCHC RBC AUTO-ENTMCNC: 32.6 G/DL (ref 31.4–37.4)
MCV RBC AUTO: 87 FL (ref 82–98)
NITRITE UR QL STRIP: NEGATIVE
NON-SQ EPI CELLS URNS QL MICRO: ABNORMAL /HPF
PH UR STRIP.AUTO: 5.5 [PH]
PLATELET # BLD AUTO: 479 THOUSANDS/UL (ref 149–390)
PMV BLD AUTO: 10 FL (ref 8.9–12.7)
POTASSIUM SERPL-SCNC: 3.4 MMOL/L (ref 3.5–5.3)
PROT UR STRIP-MCNC: NEGATIVE MG/DL
RBC # BLD AUTO: 3.69 MILLION/UL (ref 3.81–5.12)
RBC #/AREA URNS AUTO: ABNORMAL /HPF
RH BLD: POSITIVE
RH BLD: POSITIVE
RSV RNA RESP QL NAA+PROBE: NEGATIVE
SARS-COV-2 RNA RESP QL NAA+PROBE: NEGATIVE
SODIUM SERPL-SCNC: 135 MMOL/L (ref 135–147)
SP GR UR STRIP.AUTO: 1.02 (ref 1–1.03)
SPECIMEN EXPIRATION DATE: NORMAL
URATE CRY URNS QL MICRO: ABNORMAL /HPF
UROBILINOGEN UR QL STRIP.AUTO: 0.2 E.U./DL
WBC # BLD AUTO: 8.73 THOUSAND/UL (ref 4.31–10.16)
WBC #/AREA URNS AUTO: ABNORMAL /HPF

## 2023-01-25 PROCEDURE — 0W9J4ZZ DRAINAGE OF PELVIC CAVITY, PERCUTANEOUS ENDOSCOPIC APPROACH: ICD-10-PCS | Performed by: OBSTETRICS & GYNECOLOGY

## 2023-01-25 PROCEDURE — 0TJB8ZZ INSPECTION OF BLADDER, VIA NATURAL OR ARTIFICIAL OPENING ENDOSCOPIC: ICD-10-PCS | Performed by: OBSTETRICS & GYNECOLOGY

## 2023-01-25 PROCEDURE — 0WJJ4ZZ INSPECTION OF PELVIC CAVITY, PERCUTANEOUS ENDOSCOPIC APPROACH: ICD-10-PCS | Performed by: OBSTETRICS & GYNECOLOGY

## 2023-01-25 RX ORDER — DEXAMETHASONE SODIUM PHOSPHATE 10 MG/ML
INJECTION, SOLUTION INTRAMUSCULAR; INTRAVENOUS AS NEEDED
Status: DISCONTINUED | OUTPATIENT
Start: 2023-01-25 | End: 2023-01-25

## 2023-01-25 RX ORDER — SCOLOPAMINE TRANSDERMAL SYSTEM 1 MG/1
1 PATCH, EXTENDED RELEASE TRANSDERMAL ONCE AS NEEDED
Status: DISCONTINUED | OUTPATIENT
Start: 2023-01-25 | End: 2023-01-25

## 2023-01-25 RX ORDER — NEOSTIGMINE METHYLSULFATE 1 MG/ML
INJECTION INTRAVENOUS AS NEEDED
Status: DISCONTINUED | OUTPATIENT
Start: 2023-01-25 | End: 2023-01-25

## 2023-01-25 RX ORDER — GLYCOPYRROLATE 0.2 MG/ML
INJECTION INTRAMUSCULAR; INTRAVENOUS AS NEEDED
Status: DISCONTINUED | OUTPATIENT
Start: 2023-01-25 | End: 2023-01-25

## 2023-01-25 RX ORDER — ONDANSETRON 2 MG/ML
INJECTION INTRAMUSCULAR; INTRAVENOUS AS NEEDED
Status: DISCONTINUED | OUTPATIENT
Start: 2023-01-25 | End: 2023-01-25

## 2023-01-25 RX ORDER — LANOLIN ALCOHOL/MO/W.PET/CERES
3 CREAM (GRAM) TOPICAL
Status: DISCONTINUED | OUTPATIENT
Start: 2023-01-25 | End: 2023-01-25

## 2023-01-25 RX ORDER — POTASSIUM CHLORIDE 14.9 MG/ML
20 INJECTION INTRAVENOUS ONCE
Status: COMPLETED | OUTPATIENT
Start: 2023-01-25 | End: 2023-01-25

## 2023-01-25 RX ORDER — SUCCINYLCHOLINE/SOD CL,ISO/PF 100 MG/5ML
SYRINGE (ML) INTRAVENOUS AS NEEDED
Status: DISCONTINUED | OUTPATIENT
Start: 2023-01-25 | End: 2023-01-25

## 2023-01-25 RX ORDER — KETOROLAC TROMETHAMINE 30 MG/ML
15 INJECTION, SOLUTION INTRAMUSCULAR; INTRAVENOUS EVERY 6 HOURS PRN
Status: DISPENSED | OUTPATIENT
Start: 2023-01-25 | End: 2023-01-26

## 2023-01-25 RX ORDER — SODIUM CHLORIDE, SODIUM LACTATE, POTASSIUM CHLORIDE, CALCIUM CHLORIDE 600; 310; 30; 20 MG/100ML; MG/100ML; MG/100ML; MG/100ML
INJECTION, SOLUTION INTRAVENOUS CONTINUOUS PRN
Status: DISCONTINUED | OUTPATIENT
Start: 2023-01-25 | End: 2023-01-25

## 2023-01-25 RX ORDER — FENTANYL CITRATE/PF 50 MCG/ML
50 SYRINGE (ML) INJECTION
Status: DISCONTINUED | OUTPATIENT
Start: 2023-01-25 | End: 2023-01-25 | Stop reason: HOSPADM

## 2023-01-25 RX ORDER — ONDANSETRON 2 MG/ML
4 INJECTION INTRAMUSCULAR; INTRAVENOUS EVERY 6 HOURS PRN
Status: DISCONTINUED | OUTPATIENT
Start: 2023-01-25 | End: 2023-01-30 | Stop reason: HOSPADM

## 2023-01-25 RX ORDER — HYDROMORPHONE HCL/PF 1 MG/ML
0.5 SYRINGE (ML) INJECTION
Status: DISCONTINUED | OUTPATIENT
Start: 2023-01-25 | End: 2023-01-25 | Stop reason: HOSPADM

## 2023-01-25 RX ORDER — ONDANSETRON 2 MG/ML
4 INJECTION INTRAMUSCULAR; INTRAVENOUS EVERY 4 HOURS PRN
Status: DISCONTINUED | OUTPATIENT
Start: 2023-01-25 | End: 2023-01-25

## 2023-01-25 RX ORDER — HYDROMORPHONE HCL/PF 1 MG/ML
0.5 SYRINGE (ML) INJECTION
Status: DISCONTINUED | OUTPATIENT
Start: 2023-01-25 | End: 2023-01-27

## 2023-01-25 RX ORDER — MAGNESIUM HYDROXIDE 1200 MG/15ML
LIQUID ORAL AS NEEDED
Status: DISCONTINUED | OUTPATIENT
Start: 2023-01-25 | End: 2023-01-25 | Stop reason: HOSPADM

## 2023-01-25 RX ORDER — PROMETHAZINE HYDROCHLORIDE 25 MG/ML
12.5 INJECTION, SOLUTION INTRAMUSCULAR; INTRAVENOUS ONCE AS NEEDED
Status: DISCONTINUED | OUTPATIENT
Start: 2023-01-25 | End: 2023-01-25 | Stop reason: HOSPADM

## 2023-01-25 RX ORDER — PROPOFOL 10 MG/ML
INJECTION, EMULSION INTRAVENOUS CONTINUOUS PRN
Status: DISCONTINUED | OUTPATIENT
Start: 2023-01-25 | End: 2023-01-25

## 2023-01-25 RX ORDER — ROCURONIUM BROMIDE 10 MG/ML
INJECTION, SOLUTION INTRAVENOUS AS NEEDED
Status: DISCONTINUED | OUTPATIENT
Start: 2023-01-25 | End: 2023-01-25

## 2023-01-25 RX ORDER — FENTANYL CITRATE 50 UG/ML
INJECTION, SOLUTION INTRAMUSCULAR; INTRAVENOUS AS NEEDED
Status: DISCONTINUED | OUTPATIENT
Start: 2023-01-25 | End: 2023-01-25

## 2023-01-25 RX ORDER — KETOROLAC TROMETHAMINE 30 MG/ML
INJECTION, SOLUTION INTRAMUSCULAR; INTRAVENOUS AS NEEDED
Status: DISCONTINUED | OUTPATIENT
Start: 2023-01-25 | End: 2023-01-25

## 2023-01-25 RX ORDER — DEXTROSE, SODIUM CHLORIDE, AND POTASSIUM CHLORIDE 5; .9; .15 G/100ML; G/100ML; G/100ML
125 INJECTION INTRAVENOUS CONTINUOUS
Status: DISCONTINUED | OUTPATIENT
Start: 2023-01-25 | End: 2023-01-27

## 2023-01-25 RX ORDER — ENOXAPARIN SODIUM 100 MG/ML
40 INJECTION SUBCUTANEOUS
Status: DISCONTINUED | OUTPATIENT
Start: 2023-01-25 | End: 2023-01-30 | Stop reason: HOSPADM

## 2023-01-25 RX ORDER — ONDANSETRON 2 MG/ML
4 INJECTION INTRAMUSCULAR; INTRAVENOUS ONCE AS NEEDED
Status: DISCONTINUED | OUTPATIENT
Start: 2023-01-25 | End: 2023-01-25 | Stop reason: HOSPADM

## 2023-01-25 RX ORDER — MIDAZOLAM HYDROCHLORIDE 2 MG/2ML
INJECTION, SOLUTION INTRAMUSCULAR; INTRAVENOUS AS NEEDED
Status: DISCONTINUED | OUTPATIENT
Start: 2023-01-25 | End: 2023-01-25

## 2023-01-25 RX ORDER — PROPOFOL 10 MG/ML
INJECTION, EMULSION INTRAVENOUS AS NEEDED
Status: DISCONTINUED | OUTPATIENT
Start: 2023-01-25 | End: 2023-01-25

## 2023-01-25 RX ADMIN — PROPOFOL 200 MG: 10 INJECTION, EMULSION INTRAVENOUS at 16:00

## 2023-01-25 RX ADMIN — KETOROLAC TROMETHAMINE 15 MG: 30 INJECTION, SOLUTION INTRAMUSCULAR; INTRAVENOUS at 21:56

## 2023-01-25 RX ADMIN — ROCURONIUM BROMIDE 10 MG: 10 INJECTION, SOLUTION INTRAVENOUS at 16:44

## 2023-01-25 RX ADMIN — MIDAZOLAM 2 MG: 1 INJECTION INTRAMUSCULAR; INTRAVENOUS at 15:50

## 2023-01-25 RX ADMIN — ROCURONIUM BROMIDE 35 MG: 10 INJECTION, SOLUTION INTRAVENOUS at 16:10

## 2023-01-25 RX ADMIN — PROPOFOL 140 MCG/KG/MIN: 10 INJECTION, EMULSION INTRAVENOUS at 16:01

## 2023-01-25 RX ADMIN — LIDOCAINE HYDROCHLORIDE 80 MG: 20 INJECTION INTRAVENOUS at 16:00

## 2023-01-25 RX ADMIN — METRONIDAZOLE 500 MG: 500 INJECTION, SOLUTION INTRAVENOUS at 14:45

## 2023-01-25 RX ADMIN — ONDANSETRON 4 MG: 2 INJECTION INTRAMUSCULAR; INTRAVENOUS at 16:24

## 2023-01-25 RX ADMIN — METRONIDAZOLE 500 MG: 500 INJECTION, SOLUTION INTRAVENOUS at 05:43

## 2023-01-25 RX ADMIN — Medication 1 SPRAY: at 04:32

## 2023-01-25 RX ADMIN — Medication 3 MG: at 04:44

## 2023-01-25 RX ADMIN — HYDROMORPHONE HYDROCHLORIDE 0.5 MG: 1 INJECTION, SOLUTION INTRAMUSCULAR; INTRAVENOUS; SUBCUTANEOUS at 12:26

## 2023-01-25 RX ADMIN — GLYCOPYRROLATE 0.4 MCG: 0.2 INJECTION INTRAMUSCULAR; INTRAVENOUS at 17:13

## 2023-01-25 RX ADMIN — DEXTROSE, SODIUM CHLORIDE, AND POTASSIUM CHLORIDE 125 ML/HR: 5; .9; .15 INJECTION INTRAVENOUS at 20:57

## 2023-01-25 RX ADMIN — FENTANYL CITRATE 50 MCG: 50 INJECTION, SOLUTION INTRAMUSCULAR; INTRAVENOUS at 17:47

## 2023-01-25 RX ADMIN — ROCURONIUM BROMIDE 5 MG: 10 INJECTION, SOLUTION INTRAVENOUS at 16:00

## 2023-01-25 RX ADMIN — FENTANYL CITRATE 50 MCG: 50 INJECTION INTRAMUSCULAR; INTRAVENOUS at 16:22

## 2023-01-25 RX ADMIN — HYDROMORPHONE HYDROCHLORIDE 0.5 MG: 1 INJECTION, SOLUTION INTRAMUSCULAR; INTRAVENOUS; SUBCUTANEOUS at 04:23

## 2023-01-25 RX ADMIN — HYDROMORPHONE HYDROCHLORIDE 0.5 MG: 1 INJECTION, SOLUTION INTRAMUSCULAR; INTRAVENOUS; SUBCUTANEOUS at 18:22

## 2023-01-25 RX ADMIN — POTASSIUM CHLORIDE 20 MEQ: 14.9 INJECTION, SOLUTION INTRAVENOUS at 08:05

## 2023-01-25 RX ADMIN — FENTANYL CITRATE 50 MCG: 50 INJECTION, SOLUTION INTRAMUSCULAR; INTRAVENOUS at 17:41

## 2023-01-25 RX ADMIN — ONDANSETRON 4 MG: 2 INJECTION INTRAMUSCULAR; INTRAVENOUS at 04:23

## 2023-01-25 RX ADMIN — KETOROLAC TROMETHAMINE 30 MG: 30 INJECTION, SOLUTION INTRAMUSCULAR; INTRAVENOUS at 17:11

## 2023-01-25 RX ADMIN — DEXAMETHASONE SODIUM PHOSPHATE 4 MG: 10 INJECTION INTRAMUSCULAR; INTRAVENOUS at 16:24

## 2023-01-25 RX ADMIN — SCOPALAMINE 1 PATCH: 1 PATCH, EXTENDED RELEASE TRANSDERMAL at 15:52

## 2023-01-25 RX ADMIN — HYDROMORPHONE HYDROCHLORIDE 0.5 MG: 1 INJECTION, SOLUTION INTRAMUSCULAR; INTRAVENOUS; SUBCUTANEOUS at 17:12

## 2023-01-25 RX ADMIN — METRONIDAZOLE 500 MG: 500 INJECTION, SOLUTION INTRAVENOUS at 23:43

## 2023-01-25 RX ADMIN — LEVOFLOXACIN 750 MG: 5 INJECTION, SOLUTION INTRAVENOUS at 21:47

## 2023-01-25 RX ADMIN — NEOSTIGMINE METHYLSULFATE 3 MG: 1 INJECTION INTRAVENOUS at 17:13

## 2023-01-25 RX ADMIN — SODIUM CHLORIDE, SODIUM LACTATE, POTASSIUM CHLORIDE, CALCIUM CHLORIDE: 600; 310; 30; 20 INJECTION, SOLUTION INTRAVENOUS at 16:01

## 2023-01-25 RX ADMIN — Medication 80 MG: at 16:00

## 2023-01-25 RX ADMIN — FENTANYL CITRATE 50 MCG: 50 INJECTION INTRAMUSCULAR; INTRAVENOUS at 16:00

## 2023-01-25 RX ADMIN — SODIUM CHLORIDE, SODIUM LACTATE, POTASSIUM CHLORIDE, AND CALCIUM CHLORIDE: .6; .31; .03; .02 INJECTION, SOLUTION INTRAVENOUS at 15:47

## 2023-01-25 NOTE — PROGRESS NOTES
For questions/concerns on this patient, please reach out to the followin AM - 5 PM [facility]N-GynOnc- Resident  5 PM - 6 AM: [facility] G[facility]- Senior/Consult Resident  Gyn Oncology Progress note   Barber Horan 50 y o  female MRN: [id number]  Unit/Bed#: E5 -01 Encounter: [id number]    Assessment/Plan:    Barber Horan is a 50 y o  POD#5 s/p CKC who presents with intractable nausea and vomiting and abdominal distention with inability to tolerate solids and liquids since Saturday, 2023    Pelvic fluid collection  Assessment & Plan  4 8 x 2 4cm fluid collection posterior to cervix concerning for pelvic abscess versus inflammatory changes postoperatively  Speculum exam with thin black discharge s/p Monsel's solution, foul smelling  Vitals WNL, WBC 12 7, Procalcitonin 5 51, lactic acid WNL  Blood cultures x2 collected  IV Levaquin 750mg qd and Flagyl 500mg q8h  IR consulted for possible drainage    * Postoperative ileus ([address] )  Assessment & Plan  CT abd/pelvis c/w postoperative ileus with multiple dilated loops of small bowel  - Additional findings of mild pleural effusions, ascites, splenomegaly  Intractable nausea/vomiting, not tolerating solids or liquids  NPO  NGT to low intermittent suction  FEN: Dextrose 5% in 0 45% normal saline, replete electrolytes PRN, NPO  Monitor for return of bowel motility             Subjective:    Barber Horan has no current complaints  Pain is well controlled with oral analgesics  Patient is currently voiding  She is not ambulating  Patient is not currently passing flatus and has had no bowel movement  She is not tolerating PO, and denies nausea or vomitting  Patient denies fever, chills, chest pain, shortness of breath, or calf tenderness  Objective:  /80   Pulse 82   Temp 98 2 °F (36 8 °C)   Resp 19   Ht 5' 3" (1 6 m)   Wt 52 4 kg (115 lb 8 3 oz)   SpO2 100%   BMI 20 46 kg/m²     No intake/output data recorded    I/O this shift:  In:  [IV [id number]:3989]  Out: -     Lab Results   Component Value Date    WBC 12 70 (H) 01/24/2023    HGB 12 2 01/24/2023    HCT 36 9 01/24/2023    MCV 87 01/24/2023     (H) 01/24/2023       Lab Results   Component Value Date    GLUCOSE 76 04/06/2015    CALCIUM 9 2 01/24/2023     02/03/2016    K 4 1 01/24/2023    CO2 22 01/24/2023     01/24/2023    BUN 10 01/24/2023    CREATININE 0 75 01/24/2023           Physical Exam  Vitals reviewed  Exam conducted with a chaperone present  Constitutional:       General: She is not in acute distress  HENT:      Head: Normocephalic and atraumatic  Mouth/Throat:      Comments: NGT in place  Eyes:      Extraocular Movements: Extraocular movements intact  Cardiovascular:      Rate and Rhythm: Normal rate and regular rhythm  Pulses: Normal pulses  Pulmonary:      Effort: Pulmonary effort is normal  No respiratory distress  Abdominal:      General: There is distension  Tenderness: There is abdominal tenderness  There is guarding  Genitourinary:     Comments: Deferred per patient request  See H&P for previous findings  Musculoskeletal:         General: Normal range of motion  Cervical back: Normal range of motion  Right lower leg: No edema  Left lower leg: No edema  Skin:     General: Skin is warm and dry  Neurological:      General: No focal deficit present  Mental Status: She is alert  Mental status is at baseline     Psychiatric:         Mood and Affect: Mood normal          Behavior: Behavior normal            Catrachita Velasquez MD  1/25/2023  6:03 AM

## 2023-01-25 NOTE — PLAN OF CARE
Problem: Potential for Falls  Goal: Patient will remain free of falls  Description: INTERVENTIONS:  - Educate patient/family on patient safety including physical limitations  - Instruct patient to call for assistance with activity   - Consult OT/PT to assist with strengthening/mobility   - Keep Call bell within reach  - Keep bed low and locked with side rails adjusted as appropriate  - Keep care items and personal belongings within reach  - Initiate and maintain comfort rounds  - Make Fall Risk Sign visible to staff  - Apply yellow socks and bracelet for high fall risk patients  - Consider moving patient to room near nurses station  Outcome: Progressing     Problem: PAIN - ADULT  Goal: Verbalizes/displays adequate comfort level or baseline comfort level  Description: Interventions:  - Encourage patient to monitor pain and request assistance  - Assess pain using appropriate pain scale  - Administer analgesics based on type and severity of pain and evaluate response  - Implement non-pharmacological measures as appropriate and evaluate response  - Consider cultural and social influences on pain and pain management  - Notify physician/advanced practitioner if interventions unsuccessful or patient reports new pain  Outcome: Progressing     Problem: DISCHARGE PLANNING  Goal: Discharge to home or other facility with appropriate resources  Description: INTERVENTIONS:  - Identify barriers to discharge w/patient and caregiver  - Arrange for needed discharge resources and transportation as appropriate  - Identify discharge learning needs (meds, wound care, etc )  - Arrange for interpretive services to assist at discharge as needed  - Refer to Case Management Department for coordinating discharge planning if the patient needs post-hospital services based on physician/advanced practitioner order or complex needs related to functional status, cognitive ability, or social support system  Outcome: Progressing     Problem: GASTROINTESTINAL - ADULT  Goal: Minimal or absence of nausea and/or vomiting  Description: INTERVENTIONS:  - Administer IV fluids if ordered to ensure adequate hydration  - Maintain NPO status until nausea and vomiting are resolved  - Nasogastric tube if ordered  - Administer ordered antiemetic medications as needed  - Provide nonpharmacologic comfort measures as appropriate  - Advance diet as tolerated, if ordered  - Consider nutrition services referral to assist patient with adequate nutrition and appropriate food choices  Outcome: Progressing  Goal: Maintains adequate nutritional intake  Description: INTERVENTIONS:  - Monitor percentage of each meal consumed  - Identify factors contributing to decreased intake, treat as appropriate  - Assist with meals as needed  - Monitor I&O, weight, and lab values if indicated  - Obtain nutrition services referral as needed  Outcome: Progressing     Problem: METABOLIC, FLUID AND ELECTROLYTES - ADULT  Goal: Fluid balance maintained  Description: INTERVENTIONS:  - Monitor labs   - Monitor I/O and WT  - Instruct patient on fluid and nutrition as appropriate  - Assess for signs & symptoms of volume excess or deficit  Outcome: Progressing

## 2023-01-25 NOTE — ANESTHESIA PREPROCEDURE EVALUATION
Procedure:  LAPAROSCOPY DIAGNOSTIC (Uterus)  EXAM UNDER ANESTHESIA (EUA) (Vagina )  EVACUATION/ DRAINAGE pelvic collection (Pelvis)    Relevant Problems   ANESTHESIA   (+) PONV (postoperative nausea and vomiting)      GI/HEPATIC   (+) Postoperative ileus (HCC)      GYN   (+) S/P cone biopsy of cervix      NEURO/PSYCH   (+) Encounter for follow-up surveillance of breast cancer   (+) Personal history of adenocarcinoma of breast      Other   (+) Pelvic fluid collection        Physical Exam    Airway  Comment: NGT in place  Mallampati score: I  TM Distance: >3 FB  Neck ROM: full     Dental   No notable dental hx     Cardiovascular  Rhythm: regular, Rate: normal, Cardiovascular exam normal    Pulmonary  Comment: Decreases sounds right base , Breath sounds clear to auscultation, Decreased breath sounds,     Other Findings        Anesthesia Plan  ASA Score- 3 Emergent    Anesthesia Type- general with ASA Monitors  Additional Monitors:   Airway Plan: ETT  Comment: Patient consented for TAP block if requested by surgeon/open procedure          Plan Factors-    Chart reviewed  Imaging results reviewed  Existing labs reviewed  Patient summary reviewed  Induction- rapid sequence induction and intravenous  Postoperative Plan- Plan for postoperative opioid use  Planned trial extubation    Informed Consent- Anesthetic plan and risks discussed with patient

## 2023-01-25 NOTE — UTILIZATION REVIEW
Initial Clinical Review    Admission: Date/Time/Statement:   Admission Orders (From admission, onward)     Ordered        01/24/23 2021  Inpatient Admission  Once                      Orders Placed This Encounter   Procedures   • Inpatient Admission     Standing Status:   Standing     Number of Occurrences:   1     Order Specific Question:   Level of Care     Answer:   Med Surg [16]     Order Specific Question:   Estimated length of stay     Answer:   More than 2 Midnights     Order Specific Question:   Certification     Answer:   I certify that inpatient services are medically necessary for this patient for a duration of greater than two midnights  See H&P and MD Progress Notes for additional information about the patient's course of treatment  ED Arrival Information     Expected   1/24/2023     Arrival   1/24/2023 15:40    Acuity   Urgent            Means of arrival   Walk-In    Escorted by   [address] [place] [address] Oncology    Admission type   Emergency            Arrival complaint   Acute abdomen           Chief Complaint   Patient presents with   • Abdominal Pain     Pt reports abd pain, vomiting and constipation  Reports recent surgery  Initial Presentation: 50 y o  female presented to ED from Dr Office with worsening abdominal pain, intractable N/V, abdominal distention, inability to tolerate solids/liquids since Saturday 1/21, no flatus or BM since Sunday    Recent cold knife cone biopsy completed on January 20  Seen in ED 1/22 with abd pain; CT showed dilated loops of bowel without immediate concern for postoperative ileus, pain regiment was addressed, and patient was discharged home  ED Findings: tachycardic, abd tenderness, guarding  Na 134, T bili 1 87, wbc's 12 70, plats 611, Procalcitonin 5 51   CT a/p shows persistently dilated loops of small bowel consistent with new diagnosis of postoperative ileus and pelvic fluid collection posterior to the cervix with differential including postoperative inflammatory changes versus pelvic abscess (See report below)  Speculum exam with thin black discharge s/p Monsel's solution, foul smelling  NGT placed in ED  Admitted to [facility] with Pelvic fluid collection, Post Op Ileus  Plan IV flagyl & levaquin, f/u on bc's, IR consulted for possible drainage, Surgery consulted  NPO, NGT to LIS, IVF, replete lytes prn, monitor for return of bowel motility  Per Surgery: lower abd pain, distension, nausea vomiting, and CT concerning for ileus  Plan as above    Date: 1/25 Day 2: Denies further N/V since NGT placed  No flatus or BM  Abd soft, mildly distended  Cont NPO, NGT, IVF, monitor & replete lytes, await return of bowel fx    Per IR:  After review of images, the collection is not amenable to drain placement or aspiration due to no safe window to approach    Plan for OR this afternoon for Diagnostic Laparoscopy    ED Triage Vitals   Temperature Pulse Respirations Blood Pressure SpO2   01/24/23 1547 01/24/23 1547 01/24/23 1547 01/24/23 1547 01/24/23 1547   98 1 °F (36 7 °C) 101 18 130/72 99 %      Temp Source Heart Rate Source Patient Position - Orthostatic VS BP Location FiO2 (%)   01/24/23 1547 01/24/23 1547 01/24/23 1757 01/24/23 1547 --   Oral Monitor Lying Right arm       Pain Score       01/24/23 1547       10 - Worst Possible Pain          Wt Readings from Last 1 Encounters:   01/25/23 52 4 kg (115 lb 8 3 oz)     Additional Vital Signs:   01/25/23 08:19:40 98 3 °F (36 8 °C) 76 -- 115/73 87 95 % -- --   01/25/23 04:10:04 98 2 °F (36 8 °C) -- 19 119/80 93 -- -- --   01/25/23 0351 -- 82 18 120/62 -- 100 % None (Room air) Sitting   01/25/23 0111 -- 77 18 115/76 -- 98 % None (Room air) Sitting   01/24/23 2209 -- 82 18 131/70 -- 99 % None (Room air) Sitting   01/24/23 2009 -- 79 18 121/75 -- 98 % None (Room air) Sitting   01/24/23 1757 -- 82 18 134/83 -- 98 % None (Room air) Lying       Pertinent Labs/Diagnostic Test Results:   CT abdomen pelvis with contrast   Final Result by Alex Merritt MD (01/24 1929)         1  Inflammatory changes centered at the level of the cervix with wall thickening involving small bowel loops especially at the level of the lower pelvis which could be related to inflammation or infection as well as a fluid collections/pockets of    ascites in the cul-de-sac with associated enhancement  Dilatation of the small bowel loops could reflect a developing ileus or partial obstruction  2   Development of bilateral small pleural effusions, pericardial effusion and ascites  3   No change in splenomegaly with splenic varices and enlargement of the portal vein  4   High density material in the gallbladder which could be related to vicarious excretion of contrast versus development of biliary sludge  Right upper quadrant ultrasound can be obtained if there is clinical concern for acute cholecystitis  5   Mild wall thickening of the urinary bladder which could reflect cystitis or can be secondary to the inflammatory process in the pelvis  The study was marked in [facility] for immediate notification  Workstation performed: [id number]         XR abdomen 1 view kub    (Results Pending)     1/24 KUB Abd: Feeding tube in the stomach      Results from last 7 days   Lab Units 01/25/23  0107   SARS-COV-2  Negative     Results from last 7 days   Lab Units 01/25/23  0454 01/24/23  1744 01/22/23  1544   WBC Thousand/uL 8 73 12 70* 7 78   HEMOGLOBIN g/dL 10 5* 12 2 14 2   HEMATOCRIT % 32 2* 36 9 42 4   PLATELETS Thousands/uL 479* 611* 636*   NEUTROS ABS Thousands/µL  --  11 01* 6 26     Results from last 7 days   Lab Units 01/25/23  0454 01/24/23  1744 01/22/23  1544   SODIUM mmol/L 135 134* 140   POTASSIUM mmol/L 3 4* 4 1 3 6   CHLORIDE mmol/L 106 100 105   CO2 mmol/L 22 22 26   ANION GAP mmol/L 7 12 9   BUN mg/dL 9 10 13   CREATININE mg/dL 0 56* 0 75 0 88   EGFR ml/min/1 73sq m 109 93 76   CALCIUM mg/dL 8 2* 9 2 9 6   MAGNESIUM mg/dL --  2 1 1 6*     Results from last 7 days   Lab Units 01/24/23  1744 01/22/23  1544   AST U/L 20 14   ALT U/L 9 12   ALK PHOS U/L 65 52   TOTAL PROTEIN g/dL 7 0 6 8   ALBUMIN g/dL 3 9 4 3   TOTAL BILIRUBIN mg/dL 1 87* 0 91   BILIRUBIN DIRECT mg/dL  --  0 19     Results from last 7 days   Lab Units 01/25/23  0454 01/24/23  1744 01/22/23  1544   GLUCOSE RANDOM mg/dL 108 102 98     Results from last 7 days   Lab Units 01/24/23  1744   PROTIME seconds 17 5*   INR  1 44*   PTT seconds 34     Results from last 7 days   Lab Units 01/24/23  1744   PROCALCITONIN ng/ml 5 51*     Results from last 7 days   Lab Units 01/24/23  1744   LACTIC ACID mmol/L 1 0     Results from last 7 days   Lab Units 01/22/23  1544   LIPASE u/L 26     Results from last 7 days   Lab Units 01/22/23  1745   CLARITY UA  Clear   COLOR UA  Yellow   SPEC GRAV UA  1 015   PH UA  5 5   GLUCOSE UA mg/dl Negative   KETONES UA mg/dl Negative   BLOOD UA  Large*   PROTEIN UA mg/dl Negative   NITRITE UA  Negative   BILIRUBIN UA  Negative   UROBILINOGEN UA E U /dl 0 2   LEUKOCYTES UA  Small*   WBC UA /hpf 2-4   RBC UA /hpf 4-10*   BACTERIA UA /hpf Occasional   EPITHELIAL CELLS WET PREP /hpf Occasional     Results from last 7 days   Lab Units 01/25/23  0107   INFLUENZA A PCR  Negative   INFLUENZA B PCR  Negative   RSV PCR  Negative     Results from last 7 days   Lab Units 01/24/23  1754 01/24/23  1744   BLOOD CULTURE  Received in Microbiology Lab  Culture in Progress  Received in Microbiology Lab  Culture in Progress       ED Treatment:   Medication Administration from 01/24/2023 1519 to 01/25/2023 0402       Date/Time Order Dose Route Action     01/24/2023 1750 EST sodium chloride 0 9 % bolus 1,000 mL 1,000 mL Intravenous New Bag     01/24/2023 1750 EST ondansetron (ZOFRAN) injection 4 mg 4 mg Intravenous Given     01/24/2023 1751 EST HYDROmorphone (DILAUDID) injection 0 5 mg 0 5 mg Intravenous Given     01/24/2023 1905 EST HYDROmorphone (DILAUDID) injection 1 mg 1 mg Intravenous Given     01/24/2023 2009 EST sodium chloride 0 9 % bolus 1,000 mL 1,000 mL Intravenous New Bag     01/24/2023 2205 EST HYDROmorphone (DILAUDID) injection 0 5 mg 0 5 mg Intravenous Given     01/24/2023 2254 EST ondansetron (ZOFRAN) injection 4 mg 4 mg Intravenous Given     01/24/2023 2205 EST Lidocaine Viscous HCl (XYLOCAINE) 2 % mucosal solution 15 mL 15 mL Swish & Spit Given     01/24/2023 2216 EST levofloxacin (LEVAQUIN) IVPB (premix in dextrose) 750 mg 150 mL 750 mg Intravenous New Bag     01/24/2023 2350 EST metroNIDAZOLE (FLAGYL) IVPB (premix) 500 mg 100 mL 500 mg Intravenous New Bag     01/24/2023 2315 EST dextrose 5 % and sodium chloride 0 45 % infusion 125 mL/hr Intravenous New Bag        Past Medical History:   Diagnosis Date   • Anxiety    • Bacterial vaginosis    • Cancer ([facility] [address] )     right breast-  2008  3 lumpectomies/reconstruction   • H/O bilateral breast implants    • History of infection due to human papilloma virus (HPV)     last assessed - [id number]   • HPV (human papilloma virus) infection     cold knife endometrial curettage  today   1/20/2023   • Hx of radiation therapy    • BALDEMAR-2 gene mutation    • Moderate dysplasia of cervix (EMILI II)     last assessed - [id number]   • Ovarian cyst     last assessed - [id number]   • Pap smear abnormality of cervix with ASCUS favoring dysplasia     last assessed - [id number]   • PONV (postoperative nausea and vomiting)    • Secondary amenorrhea    • Thrombocytosis    • Use of tamoxifen (Nolvadex)     last assessed - [id number]   • Varicella      Admitting Diagnosis: SBO (small bowel obstruction) (HCC) [K56 609]  Abdominal pain [R10 9]  Pleural effusion [J90]  Abnormal CT scan [R93 89]  Pelvic fluid collection [R18 8]  Ascites [R18 8]  Elevated procalcitonin [R79 89]  Age/Sex: 50 y o  female     Admission Orders:  Scheduled Medications:  levofloxacin, 750 mg, Intravenous, Q24H  melatonin, 3 mg, Oral, HS  metroNIDAZOLE, 500 mg, Intravenous, Q8H  potassium chloride, 20 mEq, Intravenous, Once 1/25    Continuous IV Infusions:  dextrose 5 % and sodium chloride 0 45 %, 125 mL/hr, Intravenous, Continuous    PRN Meds:  acetaminophen, 975 mg, Oral, Q6H PRN  Dilaudid, 0 5 mg,Intravenous, Q3H  X 1 1/25  ibuprofen, 600 mg, Oral, Q6H PRN  Zofran, 4 mg, Intravenous, Q6H  X 1 1/25  oxyCODONE, 5 mg, Oral, Q4H PRN  phenol, 1 spray, Mouth/Throat, Q2H PRN x 1 1/25    NPO  NGT to LIS  IP CONSULT TO ACUTE CARE SURGERY  INPATIENT CONSULT TO IR    Network Utilization Review Department  ATTENTION: Please call with any questions or concerns to [phone] and carefully listen to the prompts so that you are directed to the right person  All voicemails are confidential   Alcario Broad all requests for admission clinical reviews, approved or denied determinations and any other requests to dedicated fax number below belonging to the campus where the patient is receiving treatment   List of dedicated fax numbers for the Facilities:  [address] DENIALS (Administrative/Medical Necessity) [phone]   [address] (Maternity/NICU/Pediatrics) [phone]   91 Cara Mendez [phone]   [facility] [place]  [phone]   [address] [address] Kourtney Estrada 28 [phone]   [address] [facility] [place] [address] [place] [address] [facility] [phone]

## 2023-01-25 NOTE — PROGRESS NOTES
Progress Note - Leyda Jason 50 y o  female MRN: [id number]    Unit/Bed#: ED 29 Encounter: [id number]      Assessment:  49 y/o F w recent cervical cone bx c/b ileus  abd soft, mild distended, mild tender throughout lower abdomen  Plan:  Npo/ ngt  Continue ivf  Ambulate  Monitor and replete lytes  dvt ppx  Wait return of bowel fcn    Subjective:   Feels a little better after the ngt placed yesterday  Denied any further nausea or vomiting  She has not passed any flatus  Denied chest pain sob, denied f/c      Objective:     Vitals: Blood pressure 121/75, pulse 79, temperature 98 1 °F (36 7 °C), temperature source Oral, resp  rate 18, weight 52 4 kg (115 lb 8 3 oz), SpO2 98 %, not currently breastfeeding  ,Body mass index is 20 46 kg/m²  Intake/Output Summary (Last 24 hours) at 1/24/2023 2036  Last data filed at 1/24/2023 1905  Gross per 24 hour   Intake 1000 ml   Output --   Net 1000 ml       Physical Exam  General: NAD  HEENT: NC/AT  MMM  Cv: RRR     Lungs: normal effort  Ab: Soft, NT/ND  Ex: no CCE  Neuro: AAOx3    Scheduled Meds:  Current Facility-Administered Medications   Medication Dose Route Frequency Provider Last Rate   • acetaminophen  975 mg Oral Q6H PRN Sam Castellon MD     • dextrose 5 % and sodium chloride 0 45 %  125 mL/hr Intravenous Continuous Sam Castellon  mL/hr (01/24/23 2315)   • ibuprofen  600 mg Oral Q6H PRN Sam Castellon MD     • levofloxacin  750 mg Intravenous Q24H Sam Castellon MD Stopped (01/24/23 2348)   • melatonin  3 mg Oral HS Ashli Yeboah MD     • metroNIDAZOLE  500 mg Intravenous Q8H Sam Castellon MD Stopped (01/25/23 0025)   • oxyCODONE  5 mg Oral Q4H PRN Sam Castellon MD     • phenol  1 spray Mouth/Throat Q2H PRN Sam Castellon MD       Continuous Infusions:dextrose 5 % and sodium chloride 0 45 %, 125 mL/hr, Last Rate: 125 mL/hr (01/24/23 2315)      PRN Meds: •  acetaminophen  •  ibuprofen  •  oxyCODONE  •  phenol      Invasive Devices     Peripheral Intravenous Line Duration           Peripheral IV 01/24/23 Left Antecubital <1 day          Drain  Duration           NG/OG/Enteral Tube Nasogastric 16 Fr Left nare <1 day                Lab, Imaging and other studies: I have personally reviewed pertinent reports      VTE Pharmacologic Prophylaxis: Sequential compression device (Venodyne)   VTE Mechanical Prophylaxis: sequential compression device

## 2023-01-25 NOTE — PLAN OF CARE
Problem: Potential for Falls  Goal: Patient will remain free of falls  Description: INTERVENTIONS:  - Educate patient/family on patient safety including physical limitations  - Instruct patient to call for assistance with activity   - Consult OT/PT to assist with strengthening/mobility   - Keep Call bell within reach  - Keep bed low and locked with side rails adjusted as appropriate  - Keep care items and personal belongings within reach  - Initiate and maintain comfort rounds  - Make Fall Risk Sign visible to staff  - Offer Toileting every  Hours, in advance of need  - Initiate/Maintain alarm  - Obtain necessary fall risk management equipment:   - Apply yellow socks and bracelet for high fall risk patients  - Consider moving patient to room near nurses station  Outcome: Progressing     Problem: PAIN - ADULT  Goal: Verbalizes/displays adequate comfort level or baseline comfort level  Description: Interventions:  - Encourage patient to monitor pain and request assistance  - Assess pain using appropriate pain scale  - Administer analgesics based on type and severity of pain and evaluate response  - Implement non-pharmacological measures as appropriate and evaluate response  - Consider cultural and social influences on pain and pain management  - Notify physician/advanced practitioner if interventions unsuccessful or patient reports new pain  Outcome: Progressing     Problem: DISCHARGE PLANNING  Goal: Discharge to home or other facility with appropriate resources  Description: INTERVENTIONS:  - Identify barriers to discharge w/patient and caregiver  - Arrange for needed discharge resources and transportation as appropriate  - Identify discharge learning needs (meds, wound care, etc )  - Arrange for interpretive services to assist at discharge as needed  - Refer to Case Management Department for coordinating discharge planning if the patient needs post-hospital services based on physician/advanced practitioner order or complex needs related to functional status, cognitive ability, or social support system  Outcome: Progressing     Problem: GASTROINTESTINAL - ADULT  Goal: Minimal or absence of nausea and/or vomiting  Description: INTERVENTIONS:  - Administer IV fluids if ordered to ensure adequate hydration  - Maintain NPO status until nausea and vomiting are resolved  - Nasogastric tube if ordered  - Administer ordered antiemetic medications as needed  - Provide nonpharmacologic comfort measures as appropriate  - Advance diet as tolerated, if ordered  - Consider nutrition services referral to assist patient with adequate nutrition and appropriate food choices  Outcome: Progressing  Goal: Maintains adequate nutritional intake  Description: INTERVENTIONS:  - Monitor percentage of each meal consumed  - Identify factors contributing to decreased intake, treat as appropriate  - Assist with meals as needed  - Monitor I&O, weight, and lab values if indicated  - Obtain nutrition services referral as needed  Outcome: Progressing     Problem: METABOLIC, FLUID AND ELECTROLYTES - ADULT  Goal: Fluid balance maintained  Description: INTERVENTIONS:  - Monitor labs   - Monitor I/O and WT  - Instruct patient on fluid and nutrition as appropriate  - Assess for signs & symptoms of volume excess or deficit  Outcome: Progressing

## 2023-01-25 NOTE — ANESTHESIA POSTPROCEDURE EVALUATION
Post-Op Assessment Note    CV Status:  Stable    Pain management: adequate     Mental Status:  Alert and awake   Hydration Status:  Euvolemic   PONV Controlled:  Controlled   Airway Patency:  Patent      Post Op Vitals Reviewed: Yes      Staff: Anesthesiologist         No notable events documented      BP 97/59 (01/25/23 1828)    Temp 98 °F (36 7 °C) (01/25/23 1828)    Pulse 66 (01/25/23 1828)   Resp (!) 10 (01/25/23 1828)    SpO2 97 % (01/25/23 1828)

## 2023-01-25 NOTE — CONSULTS
Consultation - Acute Care Surgery   Kelsea Paniagua 50 y o  female MRN: [id number]  Unit/Bed#: ED 29 Encounter: [id number]      Assessment/Plan      Assessment:  51 y/o F w recent cervical cone bx on 1/20 who presents w lower abd pain, distension, nausea vomiting, and CT concerning for ileus  Vss  Afebrile  abd mildly distended, tender throughout lower abdomen  NGT placed in ED w minimal output  Plan:  NPO/ NGT  IVF resuscitation  Ambulate  Check and replete lytes daily  Wait return of bowel function    History of Present Illness   Physician Requesting Consult: Aaron Lynn DO  Reason for Consult / Principal Problem: ileus  History, ROS and PFSH unobtainable from any source due to none  HPI: Kelsea Paniagua is a 50y o  year old female w cervical dysplasia s/p cervical cone bx on 1/20/23, who presents with lower abd pain and not having a bowel mvt since surgery  She reports abd distension associated w nausea and non bloody vomiting today  Denied any chest pain or shortness of breath  Denied fever or chills  Has not passed flatus or had a bowel mvt since surgery on 1/20  Inpatient consult to Acute Care Surgery  Consult performed by: Carey Jack MD  Consult ordered by: Aaron Lynn DO          Review of Systems   Constitutional: Negative for chills and fever  HENT: Negative  Eyes: Negative  Respiratory: Negative  Cardiovascular: Negative  Gastrointestinal: Positive for abdominal distention, abdominal pain, nausea and vomiting  Endocrine: Negative  Genitourinary: Negative  Musculoskeletal: Negative  Skin: Negative  Allergic/Immunologic: Negative  Neurological: Negative  Hematological: Negative  Psychiatric/Behavioral: Negative          Historical Information   Past Medical History:   Diagnosis Date   • Anxiety    • Bacterial vaginosis    • Cancer [facility])     right breast-  2008  3 lumpectomies/reconstruction   • H/O bilateral breast implants    • History of infection due to human papilloma virus (HPV)     last assessed - [id number]   • HPV (human papilloma virus) infection     cold knife endometrial curettage  today   1/20/2023   • Hx of radiation therapy    • BALDEMAR-2 gene mutation    • Moderate dysplasia of cervix (EMILI II)     last assessed - [id number]   • Ovarian cyst     last assessed - [id number]   • Pap smear abnormality of cervix with ASCUS favoring dysplasia     last assessed - [id number]   • PONV (postoperative nausea and vomiting)    • Secondary amenorrhea    • Thrombocytosis    • Use of tamoxifen (Nolvadex)     last assessed - [id number]   • Varicella      Past Surgical History:   Procedure Laterality Date   • BREAST LUMPECTOMY Right     last assessed - [id number]   • BREAST LUMPECTOMY Right 05/11/2009   • BREAST LUMPECTOMY Right 06/05/2009   • BREAST RECONSTRUCTION Bilateral 10/2018    bilateral implants   • BREAST RECONSTRUCTION Right 02/2019    right implant   • CERVICAL BIOPSY N/A 1/20/2023    Procedure: COLD KNIFE CONE, ENDOMETRIAL CURETTAGE;  Surgeon: Mello Escobedo MD;  Location: [facility];  Service: Gynecology Oncology   • CERVICAL BIOPSY  W/ LOOP ELECTRODE EXCISION     • COLPOSCOPY W/ BIOPSY / CURETTAGE      Colposcopy Cervix with Biopsy(s) with Endocervical Currettage   • MASTECTOMY, PARTIAL Right 05/11/2009    right partial mastectomy re-excision 6/26/09   • SENTINEL LYMPH NODE BIOPSY Right      Social History   Social History     Substance and Sexual Activity   Alcohol Use Yes   • Alcohol/week: 2 0 standard drinks   • Types: 2 Cans of beer per week    Comment: 4  x   weekly     Social History     Substance and Sexual Activity   Drug Use No     E-Cigarette/Vaping   • E-Cigarette Use Never User      E-Cigarette/Vaping Substances   • Nicotine No    • THC No    • CBD No    • Flavoring No    • Other No    • Unknown No      Social History     Tobacco Use   Smoking Status Former   • Packs/day: 0 25   • Years: 26 00   • Pack years: 6 50   • Types: Cigarettes   • Quit date:    • Years since quittin 0   Smokeless Tobacco Never   Tobacco Comments    former social smoker     Family History: non-contributory}    Meds/Allergies   all current active meds have been reviewed  Allergies   Allergen Reactions   • Cephalexin Rash       Objective   Vitals: Blood pressure 121/75, pulse 79, temperature 98 1 °F (36 7 °C), temperature source Oral, resp  rate 18, weight 52 4 kg (115 lb 8 3 oz), SpO2 98 %, not currently breastfeeding  ,Body mass index is 20 46 kg/m²  Intake/Output Summary (Last 24 hours) at 2023 2019  Last data filed at 2023 1905  Gross per 24 hour   Intake 1000 ml   Output --   Net 1000 ml     Invasive Devices     Peripheral Intravenous Line  Duration           Peripheral IV 23 Left Antecubital <1 day          Drain  Duration           NG/OG/Enteral Tube Nasogastric 16 Fr Left nare <1 day                Physical Exam  Vitals reviewed  HENT:      Head: Normocephalic  Mouth/Throat:      Mouth: Mucous membranes are moist    Eyes:      Pupils: Pupils are equal, round, and reactive to light  Cardiovascular:      Pulses: Normal pulses  Pulmonary:      Effort: Pulmonary effort is normal    Abdominal:      General: There is distension  Palpations: Abdomen is soft  Tenderness: There is abdominal tenderness  There is no guarding  Genitourinary:     Comments: deferred  Musculoskeletal:      Cervical back: Normal range of motion and neck supple  Skin:     General: Skin is warm  Capillary Refill: Capillary refill takes 2 to 3 seconds  Neurological:      General: No focal deficit present  Mental Status: She is alert and oriented to person, place, and time  Psychiatric:         Mood and Affect: Mood normal          Lab Results:   I have personally reviewed pertinent reports    , Coags:   Lab Results   Component Value Date    PTT 34 2023    INR 1 44 (H) 2023   , Creatinine:   Lab Results   Component Value Date CREATININE 0 75 01/24/2023   , Lipid Panel: No results found for: CHOL, CBC with diff:   Lab Results   Component Value Date    WBC 12 70 (H) 01/24/2023    HGB 12 2 01/24/2023    HCT 36 9 01/24/2023    MCV 87 01/24/2023     (H) 01/24/2023    MCH 28 6 01/24/2023    MCHC 33 1 01/24/2023    RDW 14 1 01/24/2023    MPV 9 9 01/24/2023    NRBC 0 01/24/2023   , BMP/CMP:   Lab Results   Component Value Date    SODIUM 134 (L) 01/24/2023    K 4 1 01/24/2023     01/24/2023    CO2 22 01/24/2023    BUN 10 01/24/2023    CREATININE 0 75 01/24/2023    CALCIUM 9 2 01/24/2023    AST 20 01/24/2023    ALT 9 01/24/2023    ALKPHOS 65 01/24/2023    EGFR 93 01/24/2023   , Coags:   Lab Results   Component Value Date    PTT 34 01/24/2023    INR 1 44 (H) 01/24/2023   , CRP: No results found for: CRP  Imaging Studies: I have personally reviewed pertinent reports  EKG, Pathology, and Other Studies: I have personally reviewed pertinent reports  VTE Prophylaxis: Sequential compression device Germania Aquino)      Code Status: No Order  Advance Directive and Living Will:      Power of :    POLST:      Counseling / Coordination of Care  Counseling/Coordination of Care: Total floor / unit time spent today 30 minutes  Greater than 50% of total time was spent with the patient and / or family counseling and / or coordination of care   A description of the counseling / coordination of care: 30

## 2023-01-25 NOTE — H&P
H&P Exam - Gynecology Oncology  Ynes Harris 50 y o  female MRN: [id number]  Unit/Bed#: ED 29 Encounter: [id number]        Assessment/Plan   Pelvic fluid collection  Assessment & Plan  4 8 x 2 4cm fluid collection posterior to cervix concerning for pelvic abscess versus inflammatory changes postoperatively  Speculum exam with thin black discharge s/p Monsel's solution, foul smelling  Vitals WNL, WBC 12 7, Procalcitonin 5 51, lactic acid WNL  Blood cultures x2 collected  IV Levaquin 750mg qd and Flagyl 500mg q8h  IR consulted for possible drainage    * Postoperative ileus (HCC)  Assessment & Plan  CT abd/pelvis c/w postoperative ileus with multiple dilated loops of small bowel  - Additional findings of mild pleural effusions, ascites, splenomegaly  Intractable nausea/vomiting, not tolerating solids or liquids  NPO  NGT to low intermittent suction  FEN: Dextrose 5% in 0 45% normal saline, replete electrolytes PRN, NPO  Monitor for return of bowel motility         History of Present Illness     HPI:  Ynes Harris is a 50 y o  female who presents with intractable nausea and vomiting and abdominal distention with inability to tolerate solids and liquids since Saturday, 1/21/2023  History significant for recent cold knife cone biopsy completed on January 20  Patient presented to the ED on January 22 with concern of intolerable abdominal pain  CT scan showed dilated loops of bowel without immediate concern for postoperative ileus, pain regiment was addressed, and patient was discharged home  Patient presented tonight January 24 with concern of intractable nausea and vomiting and lack of bone bowel motility since the 21st   Patient has not been able to tolerate solids or liquids and reports significant abdominal discomfort and distention    Initial assessment in the ED revealed persistently dilated loops of small bowel consistent with new diagnosis of postoperative ileus and pelvic fluid collection posterior to the cervix with differential including postoperative inflammatory changes versus pelvic abscess  Patient denies fevers, chills, chest pain, shortness of breath, vaginal pain, vaginal foreign body, and denies flatus production  Exam consistent with tender and distended abdomen on palpation with hypoactive bowel sounds  Speculum exam was performed with black discharge appreciated status post Monsel solution applied Intra-Op and no apparent anterior or posterior perforations or presence of loops of bowel  We discussed patient's current presentation and recommendation for treatment with IV antibiotics, n p o  status, and IR consultation  Patient was amenable to treatment and plan and all questions were answered at bedside  Oncology History Overview Note   9/2018 - observation with 81 mg ASA daily     Personal history of adenocarcinoma of breast   4/2009 Initial Diagnosis    Invasive ductal carcinoma of breast, female, right ([facility] [address] )     4/2009 - 5/2009 Cancer Staged     T1 B  N0, ER/FL positive, HER-2 negative, infiltrating ductal carcinoma  Stage IA, right     5/11/2009 Surgery    Right partial mastectomy, SLNB, breast implant  Dr Erin Carvajal     5/2009 Genetic Testing    BRCA negative     2009 - 2009 Radiation    WBRT  Dr Laurence Weiner     6/2009 - 6/2014 Hormone Therapy    Tamoxifen     Essential thrombocytosis ([facility] [address] )   6/13/2018 Initial Diagnosis    In the past, Plt count had increased and return to normal   Pt was screened for ET with Jorge mutation and was negative  Then in June 2018, platelet count elevated again and mutation testing was completed  8/23/2018 Genomic Testing    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  BCR-aBL was negative via FISH  Review of Systems   Constitutional: Negative for chills and fever  HENT: Negative for congestion, sinus pressure and sinus pain  Eyes: Negative for visual disturbance  Respiratory: Negative for cough, shortness of breath and wheezing  Cardiovascular: Negative for chest pain, palpitations and leg swelling  Gastrointestinal: Positive for abdominal distention, abdominal pain, nausea and vomiting  Negative for constipation and diarrhea  Genitourinary: Positive for vaginal discharge  Negative for dysuria, pelvic pain, vaginal bleeding and vaginal pain  Skin: Negative for color change, pallor and rash  Neurological: Negative for weakness and light-headedness  Psychiatric/Behavioral: Negative for agitation and behavioral problems         Historical Information   Past Medical History:   Diagnosis Date   • Anxiety    • Bacterial vaginosis    • Cancer [facility])     right breast-  2008  3 lumpectomies/reconstruction   • H/O bilateral breast implants    • History of infection due to human papilloma virus (HPV)     last assessed - [id number]   • HPV (human papilloma virus) infection     cold knife endometrial curettage  today   1/20/2023   • Hx of radiation therapy    • BALDEMAR-2 gene mutation    • Moderate dysplasia of cervix (EMILI II)     last assessed - [id number]   • Ovarian cyst     last assessed - [id number]   • Pap smear abnormality of cervix with ASCUS favoring dysplasia     last assessed - [id number]   • PONV (postoperative nausea and vomiting)    • Secondary amenorrhea    • Thrombocytosis    • Use of tamoxifen (Nolvadex)     last assessed - [id number]   • Varicella      Past Surgical History:   Procedure Laterality Date   • BREAST LUMPECTOMY Right     last assessed - [id number]   • BREAST LUMPECTOMY Right 05/11/2009   • BREAST LUMPECTOMY Right 06/05/2009   • BREAST RECONSTRUCTION Bilateral 10/2018    bilateral implants   • BREAST RECONSTRUCTION Right 02/2019    right implant   • CERVICAL BIOPSY N/A 1/20/2023    Procedure: COLD KNIFE CONE, ENDOMETRIAL CURETTAGE;  Surgeon: Que Lebron MD;  Location: [facility] OR;  Service: Gynecology Oncology   • CERVICAL BIOPSY  W/ LOOP ELECTRODE EXCISION     • COLPOSCOPY W/ BIOPSY / CURETTAGE      Colposcopy Cervix with Biopsy(s) with Endocervical Currettage   • MASTECTOMY, PARTIAL Right 2009    right partial mastectomy re-excision 09   • SENTINEL LYMPH NODE BIOPSY Right      OB History    Para Term  AB Living   0 0 0 0 0 0   SAB IAB Ectopic Multiple Live Births   0 0 0 0 0   Obstetric Comments   17years old (menarche)     Family History   Problem Relation Age of Onset   • No Known Problems Mother    • No Known Problems Father    • No Known Problems Sister    • No Known Problems Maternal Grandmother    • No Known Problems Maternal Grandfather    • Ovarian cancer Paternal Grandmother         age unknown   • No Known Problems Paternal Grandfather    • No Known Problems Maternal Aunt    • Cancer Paternal Aunt 61   • No Known Problems Paternal Aunt    • No Known Problems Paternal Aunt    • Colon polyps Cousin    • Colon cancer Cousin 48   • No Known Problems Half-Sister      Social History   Social History     Substance and Sexual Activity   Alcohol Use Yes   • Alcohol/week: 2 0 standard drinks   • Types: 2 Cans of beer per week    Comment: 4  x   weekly     Social History     Substance and Sexual Activity   Drug Use No     Social History     Tobacco Use   Smoking Status Former   • Packs/day: 0 25   • Years: 26 00   • Pack years: 6 50   • Types: Cigarettes   • Quit date:    • Years since quittin 0   Smokeless Tobacco Never   Tobacco Comments    former social smoker       Meds/Allergies   (Not in a hospital admission)    Allergies   Allergen Reactions   • Cephalexin Rash       Objective     /75 (BP Location: Left arm)   Pulse 79   Temp 98 1 °F (36 7 °C) (Oral)   Resp 18   Wt 52 4 kg (115 lb 8 3 oz)   SpO2 98%   BMI 20 46 kg/m²     No intake/output data recorded    I/O this shift:  In: 1000 [IV Piggyback:1000]  Out: -     Lab Results   Component Value Date    WBC 12 70 (H) 2023    HGB 12 2 2023    HCT 36 9 2023    MCV 87 2023     (H) 2023       Lab Results Component Value Date    GLUCOSE 76 04/06/2015    CALCIUM 9 2 01/24/2023     02/03/2016    K 4 1 01/24/2023    CO2 22 01/24/2023     01/24/2023    BUN 10 01/24/2023    CREATININE 0 75 01/24/2023       Physical Exam  Vitals and nursing note reviewed  Exam conducted with a chaperone present  Constitutional:       General: She is not in acute distress  HENT:      Head: Normocephalic  Right Ear: External ear normal       Left Ear: External ear normal    Eyes:      General: No scleral icterus  Right eye: No discharge  Left eye: No discharge  Conjunctiva/sclera: Conjunctivae normal    Cardiovascular:      Rate and Rhythm: Normal rate and regular rhythm  Pulses: Normal pulses  Heart sounds: Normal heart sounds  Pulmonary:      Effort: Pulmonary effort is normal  No respiratory distress  Breath sounds: Normal breath sounds  Abdominal:      General: There is distension  Palpations: Abdomen is soft  There is no mass  Tenderness: There is abdominal tenderness  There is guarding  There is no rebound  Comments: Hypoactive bowel sounds  Moderate distention  Tender abdomen on palpation with guarding   Genitourinary:     General: Normal vulva  Vagina: Vaginal discharge present  Comments: Speculum exam with black discharge c/w intraop Monsel's solution  No apparent vaginal wall lacerations or perforations  Foul smelling vaginal discharge  Musculoskeletal:         General: No swelling or tenderness  Normal range of motion  Cervical back: Normal range of motion  Right lower leg: No edema  Left lower leg: No edema  Skin:     General: Skin is warm and dry  Capillary Refill: Capillary refill takes less than 2 seconds  Neurological:      Mental Status: She is alert and oriented to person, place, and time  Mental status is at baseline     Psychiatric:         Mood and Affect: Mood normal          Behavior: Behavior normal  Imaging: I have personally reviewed pertinent reports  EKG, Pathology, and Other Studies: I have personally reviewed pertinent reports          Code Status: Level 1 - Full Code    Sam Castellon MD  1/24/2023  8:19 PM

## 2023-01-25 NOTE — ASSESSMENT & PLAN NOTE
· 4 8 x 2 4cm fluid collection posterior to cervix concerning for pelvic abscess versus inflammatory changes postoperatively  · 1/25: diagnostic lap, pelvic washout, and drain placement - pelvic abscess, small peritoneal defect  · Blood Cx #1 NGTD, Blood Cx#2 coag neg staph, Tissue Cx: gram + cocci in pairs (strep)  Current abx:  -> per ID, narrowed to Unasyn 3g q6, now transitioned to PO augmentin    Lab Results   Component Value Date/Time    WBC 8 27 01/29/2023 04:19 AM    WBC 9 40 01/28/2023 08:29 AM    WBC 5 90 01/27/2023 04:50 AM     (H) 01/29/2023 04:19 AM     (H) 01/28/2023 08:29 AM     (H) 01/27/2023 04:50 AM     RLQ drain with 10cc serous output last 24 hours, can consider removal  ID consulted, appreciate recs   Repeat CTAP with PO and IV contrast on 1/28 with minimal change, ileus with drain tip in pelvis

## 2023-01-25 NOTE — OP NOTE
OPERATIVE REPORT  PATIENT NAME: Quinton Mcdermott    :  1970  MRN: [id number]  Pt Location: [facility] OR ROOM 07    SURGERY DATE: 2023    Surgeon(s) and Role:     * Yusra Neal MD - Primary     * Donell Proctor MD - Assisting    Preop Diagnosis:  Pelvic fluid collection [R18 8]    Post-Op Diagnosis Codes:     * Pelvic fluid collection [R18 8]    Procedure(s):  LAPAROSCOPY DIAGNOSTIC  PELVIC WASHOUT AND PELVIC DRAIN PLACEMENT  CYSTOSCOPY    Specimen(s):  ID Type Source Tests Collected by Time Destination   A : Pelvic Debri for culture Tissue Soft Tissue, Other CULTURE, TISSUE AND GRAM STAIN, AFB CULTURE WITH STAIN Yusra Neal MD 2023 1642    B : pelvic fluid for culture  Body Fluid Pelvic Washing ANAEROBIC CULTURE AND GRAM STAIN Yusra Neal MD 2023 1649    C : pelvic fluid for culture  Body Fluid Pelvic Washing AFB CULTURE WITH STAIN Yusra Neal MD 2023 1650        Estimated Blood Loss:   10 mL    Drains:  Closed/Suction Drain RLQ Bulb 19 Fr  (Active)   Number of days: 0       NG/OG/Enteral Tube Nasogastric 16 Fr Left nare (Active)   Placement Reverification X-ray 23 1831   Number of days: 1       Urethral Catheter 16 Fr  (Active)   Number of days: 0       [REMOVED] Closed/Suction Drain Medial;Left LLQ Bulb 19 Fr  (Removed)   Number of days: 0       Anesthesia Type:   Choice    Operative Indications:  Patient presented approximately 96 hours after cold knife cone procedure with ileus, imaging suggestive of pelvic abscess, leukocytosis and elevated pro-calcitonin  Patient was found to be septic but not in septic shock or with severe sepsis  Antibiotics were started and she was taken to the operating room for surgical exploration for source control  Operative Findings:  #1  Pelvic abscess likely related to  cone biopsy  There was evidence of a small defect on the posterior cul-de-sac in relation to a very attenuated left uterosacral ligament    Noted significant debris in this area  There was evidence of contiguous abscess formation mostly involving the posterior cul-de-sac and left pelvic sidewall and left ovarian fossa  The rectum was insufflated under water with no evidence of bubble leaks  Inflamed loops of bowel were thoroughly explored with no evidence of perforation  #2   Cystoscopy demonstrated normal bladder mucosa, bilateral potent ureteral urine jets and no evidence of injuries or other abnormalities  #3   Splenomegaly noted  Free edge of the spleen covered by omentum but certainly enlarged as noted on CT scan  No evidence of carcinomatosis or other upper abdominal abnormalities  Complications:   None    Procedure and Technique:  The patient was taken to the operating room where general trach anesthesia was induced without complications  The patient was placed in the dorsolithotomy position in [address] [place] [address] and her abdomen, perineum and vagina were prepped and draped in the usual sterile fashion  The patient is on systemic broad-spectrum antibiotics and no specific antibiotics were indicated/administered for this diagnostic procedure  A 16 Hebrew Panda was placed in the vagina  Sequential compression devices were applied to her lower extremities and activated prior to induction of anesthesia and we ordered Lovenox to be administered perioperatively  A 5 mm skin incision was made at the base of the navel after infiltration with local anesthetic   0 5% bupivacaine was infiltrated in this and all other incisions at the beginning and completion of the procedure  Using a 5 mm optic trocar, the 0 degree laparoscope was introduced under direct visualization without difficulty  When interpreting location was confirmed  Pneumoperitoneum was allowed to maximal pressure of 15 mmHg  2 additional 5 mm trochars were inserted in the right and left lower quadrants under direct visualization  No emergent findings were noted    The upper abdomen was thoroughly explored and the above-mentioned findings were noted  Then, the bowel was run from the terminal ileum to the mid jejunum and inflamed loops of bowel densely adherent to posterior cul-de-sac were mobilized without difficulty  The appendix was also visualized  Inflamed loops of small bowel were tested by increasing intraluminal pressure by extrinsic compression with no evidence of leaks or injuries  Then, pelvic cultures were obtained and sent  The pelvis was copiously irrigated with approximately 3 L of normal saline  Above-mentioned findings were noted  Trendelenburg was then reversed and the pelvis was filled with saline  The sigmoid was occluded proximally and the rectum distended using a rigid sigmoidoscope  There was no evidence of bubble leaks  Then, a sponge stick was placed in the vagina and mobilized cephalad which allowed visualization of the posterior cul-de-sac in more detail  A pinpoint defect with inflammatory debris consistent with possible point of entry was identified  At this point a 23 Demetrius drain was placed and exteriorized through the following meter right lower quadrant trocar site  The drain was placed along the pelvis with the tip projecting onto the left ovarian fossa  The drain was secured to the skin with a stitch of 2-0 nylon  Pneumoperitoneum was completely evacuated using Valsalva maneuvers and all trochars were removed  The skin at all port sites was closed using a subcuticular stitch of 4-0 Monocryl  Attention was turned to the perineum  The Panda catheter was removed and the bladder was retrograde filled using the suction  with approximately 200 mL of normal saline  Using a 30 degree laparoscope the bladder was examined and the above-mentioned findings were noted  The 12 Korean Panda catheter was reinserted  Vaginal exam then revealed the cone bed with no evidence of active infection, bleeding or other abnormalities      The patient tolerated procedure well  Sponge, needle instrument counts reports correct x2  The patient was successfully extubated in the operating room and transferred to the postanesthetic care unit in stable condition       I was present for the entire procedure    Patient Disposition:  PACU     SIGNATURE: James Jerry MD [facility]  DATE: January 25, 2023  TIME: 5:28 PM

## 2023-01-25 NOTE — ASSESSMENT & PLAN NOTE
· CT abd/pelvis on admit c/w postoperative ileus with multiple dilated loops of small bowel, s/p NG tube  · Pain: venus tylenol, PRN motrin  · FEN: Cindy diet, replete electrolytes PRN

## 2023-01-25 NOTE — CONSULTS
e-Consult (IPC)  - Interventional Radiology  Sara Benavides 50 y o  female MRN: [id number]  Unit/Bed#: E5 -01 Encounter: [id number]          Interventional Radiology has been consulted to evaluate Sara Benavides    We were consulted by OBGYN concerning this patient with pelvic collection  Inpatient Consult to IR  Consult performed by: Modesto Lo PA-C  Consult ordered by: Jero Bui MD        01/25/23    Assessment/Recommendation:   Patient is a 51 y/o female with PMH right breast cancer s/p lumpectomy and reconstruction, recent HPV, POD#5 cold knife cone by OBGYN who presented with worsening abdominal pain, N/V/C  Patient found to have fluid collection on CT on 1/24 posterior to cervix 4 8 x 2 4cm  We were consulted to evaluate the fluid collection     -Case discussed with OBGYN and Dr Daiana Alonso  -After review of images, the collection is not amenable to drain placement or aspiration due to no safe window to approach   -Please reach out if patient's status changes and needs reassessment by IR  -Remainder of care per primary team  -Please reach out to IR with any questions concerns    5-10 minutes, >50% of the total time devoted to medical consultative verbal/EMR discussion between providers  Written report will be generated in the EMR  Thank you for allowing Interventional Radiology to participate in the care of Sara Benavides  Please don't hesitate to call or TigerText us with any questions       Modesto Lo PA-C

## 2023-01-26 LAB
ALBUMIN SERPL BCP-MCNC: 3 G/DL (ref 3.5–5)
ALP SERPL-CCNC: 39 U/L (ref 34–104)
ALT SERPL W P-5'-P-CCNC: 5 U/L (ref 7–52)
ANION GAP SERPL CALCULATED.3IONS-SCNC: 7 MMOL/L (ref 4–13)
AST SERPL W P-5'-P-CCNC: 7 U/L (ref 13–39)
BILIRUB SERPL-MCNC: 0.6 MG/DL (ref 0.2–1)
BUN SERPL-MCNC: 5 MG/DL (ref 5–25)
CALCIUM ALBUM COR SERPL-MCNC: 8.9 MG/DL (ref 8.3–10.1)
CALCIUM SERPL-MCNC: 8.1 MG/DL (ref 8.4–10.2)
CHLORIDE SERPL-SCNC: 108 MMOL/L (ref 96–108)
CO2 SERPL-SCNC: 22 MMOL/L (ref 21–32)
CREAT SERPL-MCNC: 0.63 MG/DL (ref 0.6–1.3)
ERYTHROCYTE [DISTWIDTH] IN BLOOD BY AUTOMATED COUNT: 14.1 % (ref 11.6–15.1)
GFR SERPL CREATININE-BSD FRML MDRD: 104 ML/MIN/1.73SQ M
GLUCOSE SERPL-MCNC: 166 MG/DL (ref 65–140)
HCT VFR BLD AUTO: 32.6 % (ref 34.8–46.1)
HGB BLD-MCNC: 10.8 G/DL (ref 11.5–15.4)
MCH RBC QN AUTO: 28.6 PG (ref 26.8–34.3)
MCHC RBC AUTO-ENTMCNC: 33.1 G/DL (ref 31.4–37.4)
MCV RBC AUTO: 86 FL (ref 82–98)
PLATELET # BLD AUTO: 443 THOUSANDS/UL (ref 149–390)
PMV BLD AUTO: 10.1 FL (ref 8.9–12.7)
POTASSIUM SERPL-SCNC: 4 MMOL/L (ref 3.5–5.3)
PROT SERPL-MCNC: 5.4 G/DL (ref 6.4–8.4)
RBC # BLD AUTO: 3.78 MILLION/UL (ref 3.81–5.12)
RHODAMINE-AURAMINE STN SPEC: NORMAL
RHODAMINE-AURAMINE STN SPEC: NORMAL
S AUREUS+CONS DNA BLD POS NAA+NON-PROBE: DETECTED
SODIUM SERPL-SCNC: 137 MMOL/L (ref 135–147)
WBC # BLD AUTO: 8.2 THOUSAND/UL (ref 4.31–10.16)

## 2023-01-26 RX ADMIN — LEVOFLOXACIN 750 MG: 5 INJECTION, SOLUTION INTRAVENOUS at 22:01

## 2023-01-26 RX ADMIN — HYDROMORPHONE HYDROCHLORIDE 0.5 MG: 1 INJECTION, SOLUTION INTRAMUSCULAR; INTRAVENOUS; SUBCUTANEOUS at 00:31

## 2023-01-26 RX ADMIN — METRONIDAZOLE 500 MG: 500 INJECTION, SOLUTION INTRAVENOUS at 05:54

## 2023-01-26 RX ADMIN — HYDROMORPHONE HYDROCHLORIDE 0.5 MG: 1 INJECTION, SOLUTION INTRAMUSCULAR; INTRAVENOUS; SUBCUTANEOUS at 06:06

## 2023-01-26 RX ADMIN — DEXTROSE, SODIUM CHLORIDE, AND POTASSIUM CHLORIDE 125 ML/HR: 5; .9; .15 INJECTION INTRAVENOUS at 05:45

## 2023-01-26 RX ADMIN — ENOXAPARIN SODIUM 40 MG: 40 INJECTION SUBCUTANEOUS at 08:44

## 2023-01-26 RX ADMIN — HYDROMORPHONE HYDROCHLORIDE 0.5 MG: 1 INJECTION, SOLUTION INTRAMUSCULAR; INTRAVENOUS; SUBCUTANEOUS at 20:40

## 2023-01-26 RX ADMIN — METRONIDAZOLE 500 MG: 500 INJECTION, SOLUTION INTRAVENOUS at 14:00

## 2023-01-26 RX ADMIN — DEXTROSE, SODIUM CHLORIDE, AND POTASSIUM CHLORIDE 125 ML/HR: 5; .9; .15 INJECTION INTRAVENOUS at 14:06

## 2023-01-26 RX ADMIN — DEXTROSE, SODIUM CHLORIDE, AND POTASSIUM CHLORIDE 125 ML/HR: 5; .9; .15 INJECTION INTRAVENOUS at 20:49

## 2023-01-26 RX ADMIN — HYDROMORPHONE HYDROCHLORIDE 0.5 MG: 1 INJECTION, SOLUTION INTRAMUSCULAR; INTRAVENOUS; SUBCUTANEOUS at 17:07

## 2023-01-26 RX ADMIN — HYDROMORPHONE HYDROCHLORIDE 0.5 MG: 1 INJECTION, SOLUTION INTRAMUSCULAR; INTRAVENOUS; SUBCUTANEOUS at 13:08

## 2023-01-26 RX ADMIN — METRONIDAZOLE 500 MG: 500 INJECTION, SOLUTION INTRAVENOUS at 22:01

## 2023-01-26 RX ADMIN — ONDANSETRON HYDROCHLORIDE 4 MG: 2 SOLUTION INTRAMUSCULAR; INTRAVENOUS at 17:07

## 2023-01-26 RX ADMIN — HYDROMORPHONE HYDROCHLORIDE 0.5 MG: 1 INJECTION, SOLUTION INTRAMUSCULAR; INTRAVENOUS; SUBCUTANEOUS at 09:28

## 2023-01-26 NOTE — DISCHARGE SUMMARY
Discharge Summary   Bryn Marion MRN: [id number]  Unit/Bed#: E5 -01 Encounter: [id number]      Admission Date: 1/24/2023     Discharge Date: 1/30/2023    Attending: Fani Hsu MD    Principal Diagnosis: [facility] [K56 609]  Abdominal pain [R10 9]  Pleural effusion [J90]  Abnormal CT scan [R93 89]  Pelvic fluid collection [R18 8]  Ascites [R18 8]  Elevated procalcitonin [R79 89]    Procedures:   1/25: diagnostic laparoscopy, pelvic washout, pelvic drain placement, cystoscopy    Hospital course: Bryn Marion is a 56yo admitted with intractable nausea and vomiting with inability to tolerate solids or liquids after a cone biopsy on 1/20/2023  CT scan showed dilated loops of bowel with additional findings of pleural effusions, ascites, and splenomegaly  At presentation, she was found to have leukocytosis and an elevated procalcitonin  An NGT was inserted, and she was started on IV antibiotics and pain medication  A repeat CT showed a fluid collection with enhancing borders posterior to the cervix  She was taken to the OR on 1/25 for a diagnostic laparoscopy and pelvic washout  Findings included a pelvic abscess and a small defect on the posterior cul-de-sac with significant debris  A JOYCE drain was placed at the end of the procedure  The NGT was removed on 1/27  A third CT scan was obtained and did not show any evidence of a walled off abscess  Her diet was advanced as tolerated, and she was on a regular house diet on day of discharge  She was transitioned to PO antibiotics  The JOYCE drain insertion site had increased discharge and an additional suture was placed to better secure the opening  On day of discharge she was reasonably completing all ADLS  She will follow up for removal of the JOYCE drain in 5-7 days      Lab Results:   Lab Results   Component Value Date    WBC 8 20 01/26/2023    HGB 10 8 (L) 01/26/2023    HCT 32 6 (L) 01/26/2023    MCV 86 01/26/2023     (H) 01/26/2023     Lab Results   Component Value Date    GLUCOSE 76 04/06/2015    CALCIUM 8 1 (L) 01/26/2023     02/03/2016    K 4 0 01/26/2023    CO2 22 01/26/2023     01/26/2023    BUN 5 01/26/2023    CREATININE 0 63 01/26/2023     No results found for: POCGLU  Lab Results   Component Value Date    PTT 34 01/24/2023     Lab Results   Component Value Date    INR 1 44 (H) 01/24/2023    PROTIME 17 5 (H) 92/57/6127       Complications: none apparent    Condition at discharge: stable     Discharge instructions/Information to patient and family:   See After Visit Summary for information provided to patient and family  Provisions for Follow-Up Care:  See After Visit Summary for information related to follow-up care and any pertinent home health orders  Disposition: See After Visit Summary for discharge disposition information  Planned Readmission: Yes, pending clinical status    Discharge Medications: For a complete list of the patient's medications, please refer to her med rec      Davida Hou MD  1/26/2023  8:19 AM

## 2023-01-26 NOTE — UTILIZATION REVIEW
SURGERY DATE: 1/25/2023    Procedure(s):  LAPAROSCOPY DIAGNOSTIC  PELVIC WASHOUT AND PELVIC DRAIN PLACEMENT  CYSTOSCOPY    Operative Findings:  #1  Pelvic abscess likely related to 1/20 cone biopsy  There was evidence of a small defect on the posterior cul-de-sac in relation to a very attenuated left uterosacral ligament  Noted significant debris in this area  There was evidence of contiguous abscess formation mostly involving the posterior cul-de-sac and left pelvic sidewall and left ovarian fossa  The rectum was insufflated under water with no evidence of bubble leaks  Inflamed loops of bowel were thoroughly explored with no evidence of perforation  #2   Cystoscopy demonstrated normal bladder mucosa, bilateral potent ureteral urine jets and no evidence of injuries or other abnormalities  #3   Splenomegaly noted  Free edge of the spleen covered by omentum but certainly enlarged as noted on CT scan  No evidence of carcinomatosis or other upper abdominal abnormalities

## 2023-01-26 NOTE — PLAN OF CARE
Problem: Potential for Falls  Goal: Patient will remain free of falls  Description: INTERVENTIONS:  - Educate patient/family on patient safety including physical limitations  - Instruct patient to call for assistance with activity   - Consult OT/PT to assist with strengthening/mobility   - Keep Call bell within reach  - Keep bed low and locked with side rails adjusted as appropriate  - Keep care items and personal belongings within reach  - Initiate and maintain comfort rounds  - Make Fall Risk Sign visible to staff  - Apply yellow socks and bracelet for high fall risk patients  - Consider moving patient to room near nurses station  Outcome: Progressing     Problem: PAIN - ADULT  Goal: Verbalizes/displays adequate comfort level or baseline comfort level  Description: Interventions:  - Encourage patient to monitor pain and request assistance  - Assess pain using appropriate pain scale  - Administer analgesics based on type and severity of pain and evaluate response  - Implement non-pharmacological measures as appropriate and evaluate response  - Consider cultural and social influences on pain and pain management  - Notify physician/advanced practitioner if interventions unsuccessful or patient reports new pain  Outcome: Progressing     Problem: DISCHARGE PLANNING  Goal: Discharge to home or other facility with appropriate resources  Description: INTERVENTIONS:  - Identify barriers to discharge w/patient and caregiver  - Arrange for needed discharge resources and transportation as appropriate  - Identify discharge learning needs (meds, wound care, etc )  - Arrange for interpretive services to assist at discharge as needed  - Refer to Case Management Department for coordinating discharge planning if the patient needs post-hospital services based on physician/advanced practitioner order or complex needs related to functional status, cognitive ability, or social support system  Outcome: Progressing     Problem: GASTROINTESTINAL - ADULT  Goal: Minimal or absence of nausea and/or vomiting  Description: INTERVENTIONS:  - Administer IV fluids if ordered to ensure adequate hydration  - Maintain NPO status until nausea and vomiting are resolved  - Nasogastric tube if ordered  - Administer ordered antiemetic medications as needed  - Provide nonpharmacologic comfort measures as appropriate  - Advance diet as tolerated, if ordered  - Consider nutrition services referral to assist patient with adequate nutrition and appropriate food choices  Outcome: Progressing  Goal: Maintains adequate nutritional intake  Description: INTERVENTIONS:  - Monitor percentage of each meal consumed  - Identify factors contributing to decreased intake, treat as appropriate  - Assist with meals as needed  - Monitor I&O, weight, and lab values if indicated  - Obtain nutrition services referral as needed  Outcome: Progressing     Problem: METABOLIC, FLUID AND ELECTROLYTES - ADULT  Goal: Fluid balance maintained  Description: INTERVENTIONS:  - Monitor labs   - Monitor I/O and WT  - Instruct patient on fluid and nutrition as appropriate  - Assess for signs & symptoms of volume excess or deficit  Outcome: Progressing     Problem: MOBILITY - ADULT  Goal: Maintain or return to baseline ADL function  Description: INTERVENTIONS:  -  Assess patient's ability to carry out ADLs; assess patient's baseline for ADL function and identify physical deficits which impact ability to perform ADLs (bathing, care of mouth/teeth, toileting, grooming, dressing, etc )  - Assess/evaluate cause of self-care deficits   - Assess range of motion  - Assess patient's mobility; develop plan if impaired  - Assess patient's need for assistive devices and provide as appropriate  - Encourage maximum independence but intervene and supervise when necessary  - Involve family in performance of ADLs  - Assess for home care needs following discharge   - Consider OT consult to assist with ADL evaluation and planning for discharge  - Provide patient education as appropriate  Outcome: Progressing

## 2023-01-26 NOTE — PROGRESS NOTES
For questions/concerns on this patient, please reach out to the followin AM - 5 PM JAZMÍN-GynOnc- Resident  5 PM - 6 AM: [facility] OB GYN- Senior/Consult Resident  Gyn Oncology Progress note   Jonah Rodriguez 50 y o  female MRN: [id number]  Unit/Bed#: E5 -01 Encounter: [id number]    Assessment/Plan:    [employer] a 50 y o  POD#1 s/p diagnostic lap and pelvic washout who presents with intractable nausea and vomiting and abdominal distention with inability to tolerate solids and liquids after a CKC on   Pelvic fluid collection  Assessment & Plan  4 8 x 2 4cm fluid collection posterior to cervix concerning for pelvic abscess versus inflammatory changes postoperatively    Vitals WNL, Procalcitonin 5 51, lactic acid WNL  WBC: 12 7 > 8 7 > 8 2  Blood cultures x2 collected - NGTD  IV Levaquin 750mg qd and Flagyl 500mg q8h    : diagnostic lap, pelvic washout, and drain placement - pelvic abscess, small peritoneal defect    * Postoperative ileus (HCC)  Assessment & Plan  CT abd/pelvis c/w postoperative ileus with multiple dilated loops of small bowel  - Additional findings of mild pleural effusions, ascites, splenomegaly  Intractable nausea/vomiting, not tolerating solids or liquids  NGT to low intermittent suction  FEN: Dextrose 5% in NS with KCl, replete electrolytes PRN, NPO  Monitor for return of bowel motility, possible clamp trial today           Subjective:    Jonah Rodriguez has no current complaints  Pain is well controlled with IV analgesics  Patient is not currently voiding, she had good output overnight and wei was removed at 0600  She is ambulating  Patient is not currently passing flatus and has had no bowel movement  She is NPO, and denies nausea or vomitting  Patient denies fever, chills, chest pain, shortness of breath, or calf tenderness       Objective:  /73   Pulse 74   Temp 98 1 °F (36 7 °C)   Resp 18   Ht 5' 3" (1 6 m)   Wt 52 4 kg (115 lb 8 3 oz)   SpO2 98%   BMI 20 46 kg/m²     I/O last 3 completed shifts: In: 3100 [I V :1100; IV Piggyback:2000]  Out: 450 [Urine:250; Drains:190; Blood:10]  I/O this shift:  In: -   Out: 845 [Urine:800; Drains:45]    Lab Results   Component Value Date    WBC 8 20 01/26/2023    HGB 10 8 (L) 01/26/2023    HCT 32 6 (L) 01/26/2023    MCV 86 01/26/2023     (H) 01/26/2023       Lab Results   Component Value Date    GLUCOSE 76 04/06/2015    CALCIUM 8 2 (L) 01/25/2023     02/03/2016    K 3 4 (L) 01/25/2023    CO2 22 01/25/2023     01/25/2023    BUN 9 01/25/2023    CREATININE 0 56 (L) 01/25/2023           Physical Exam  Vitals reviewed  Constitutional:       General: She is not in acute distress  Appearance: Normal appearance  She is not toxic-appearing  HENT:      Head: Normocephalic and atraumatic  Mouth/Throat:      Comments: NGT in place  Eyes:      Extraocular Movements: Extraocular movements intact  Cardiovascular:      Rate and Rhythm: Normal rate and regular rhythm  Pulses: Normal pulses  Pulmonary:      Effort: Pulmonary effort is normal  No respiratory distress  Abdominal:      General: There is no distension  Palpations: Abdomen is soft  Tenderness: There is no abdominal tenderness  There is no guarding  Comments: JOYCE drain RLQ, port sites C/DI   Musculoskeletal:         General: Normal range of motion  Cervical back: Normal range of motion  Skin:     General: Skin is warm and dry  Neurological:      General: No focal deficit present  Mental Status: She is alert  Mental status is at baseline     Psychiatric:         Mood and Affect: Mood normal          Behavior: Behavior normal            Olya Sutherland MD  1/26/2023  6:35 AM

## 2023-01-27 PROBLEM — D75.1 ERYTHROCYTOSIS: Status: ACTIVE | Noted: 2018-01-05

## 2023-01-27 PROBLEM — R87.810 ASCUS WITH POSITIVE HIGH RISK HPV CERVICAL: Status: ACTIVE | Noted: 2017-11-30

## 2023-01-27 PROBLEM — E55.9 VITAMIN D DEFICIENCY: Status: ACTIVE | Noted: 2017-11-06

## 2023-01-27 PROBLEM — N65.0 BREAST RECONSTRUCTION DEFORMITY: Status: ACTIVE | Noted: 2018-08-20

## 2023-01-27 PROBLEM — R87.610 ASCUS WITH POSITIVE HIGH RISK HPV CERVICAL: Status: ACTIVE | Noted: 2017-11-30

## 2023-01-27 PROBLEM — Z98.890 S/P BREAST RECONSTRUCTION: Status: ACTIVE | Noted: 2019-01-29

## 2023-01-27 LAB
ALBUMIN SERPL BCP-MCNC: 2.7 G/DL (ref 3.5–5)
ALP SERPL-CCNC: 33 U/L (ref 34–104)
ALT SERPL W P-5'-P-CCNC: 4 U/L (ref 7–52)
ANION GAP SERPL CALCULATED.3IONS-SCNC: 4 MMOL/L (ref 4–13)
AST SERPL W P-5'-P-CCNC: 7 U/L (ref 13–39)
BACTERIA TISS AEROBE CULT: ABNORMAL
BILIRUB SERPL-MCNC: 0.37 MG/DL (ref 0.2–1)
BUN SERPL-MCNC: 6 MG/DL (ref 5–25)
CALCIUM ALBUM COR SERPL-MCNC: 8.7 MG/DL (ref 8.3–10.1)
CALCIUM SERPL-MCNC: 7.7 MG/DL (ref 8.4–10.2)
CHLORIDE SERPL-SCNC: 114 MMOL/L (ref 96–108)
CO2 SERPL-SCNC: 23 MMOL/L (ref 21–32)
CREAT SERPL-MCNC: 0.64 MG/DL (ref 0.6–1.3)
ERYTHROCYTE [DISTWIDTH] IN BLOOD BY AUTOMATED COUNT: 14.2 % (ref 11.6–15.1)
GFR SERPL CREATININE-BSD FRML MDRD: 104 ML/MIN/1.73SQ M
GLUCOSE SERPL-MCNC: 122 MG/DL (ref 65–140)
GRAM STN SPEC: ABNORMAL
HCT VFR BLD AUTO: 31 % (ref 34.8–46.1)
HGB BLD-MCNC: 10.1 G/DL (ref 11.5–15.4)
MAGNESIUM SERPL-MCNC: 1.8 MG/DL (ref 1.9–2.7)
MCH RBC QN AUTO: 28.5 PG (ref 26.8–34.3)
MCHC RBC AUTO-ENTMCNC: 32.6 G/DL (ref 31.4–37.4)
MCV RBC AUTO: 87 FL (ref 82–98)
PLATELET # BLD AUTO: 458 THOUSANDS/UL (ref 149–390)
PMV BLD AUTO: 10.5 FL (ref 8.9–12.7)
POTASSIUM SERPL-SCNC: 3.6 MMOL/L (ref 3.5–5.3)
PROT SERPL-MCNC: 4.7 G/DL (ref 6.4–8.4)
RBC # BLD AUTO: 3.55 MILLION/UL (ref 3.81–5.12)
SODIUM SERPL-SCNC: 141 MMOL/L (ref 135–147)
WBC # BLD AUTO: 5.9 THOUSAND/UL (ref 4.31–10.16)

## 2023-01-27 RX ORDER — HYDROMORPHONE HCL/PF 1 MG/ML
0.5 SYRINGE (ML) INJECTION
Status: DISCONTINUED | OUTPATIENT
Start: 2023-01-27 | End: 2023-01-29

## 2023-01-27 RX ORDER — ACETAMINOPHEN 325 MG/1
650 TABLET ORAL EVERY 6 HOURS SCHEDULED
Status: DISCONTINUED | OUTPATIENT
Start: 2023-01-27 | End: 2023-01-30 | Stop reason: HOSPADM

## 2023-01-27 RX ORDER — KETOROLAC TROMETHAMINE 30 MG/ML
15 INJECTION, SOLUTION INTRAMUSCULAR; INTRAVENOUS EVERY 6 HOURS PRN
Status: ACTIVE | OUTPATIENT
Start: 2023-01-27 | End: 2023-01-28

## 2023-01-27 RX ORDER — DEXTROSE, SODIUM CHLORIDE, AND POTASSIUM CHLORIDE 5; .9; .15 G/100ML; G/100ML; G/100ML
100 INJECTION INTRAVENOUS CONTINUOUS
Status: DISCONTINUED | OUTPATIENT
Start: 2023-01-27 | End: 2023-01-28

## 2023-01-27 RX ORDER — HYDROXYZINE HCL 10 MG/5 ML
25 SOLUTION, ORAL ORAL EVERY 6 HOURS PRN
Status: DISCONTINUED | OUTPATIENT
Start: 2023-01-27 | End: 2023-01-30 | Stop reason: HOSPADM

## 2023-01-27 RX ADMIN — ACETAMINOPHEN 650 MG: 325 TABLET ORAL at 18:31

## 2023-01-27 RX ADMIN — ONDANSETRON HYDROCHLORIDE 4 MG: 2 SOLUTION INTRAMUSCULAR; INTRAVENOUS at 00:14

## 2023-01-27 RX ADMIN — HYDROMORPHONE HYDROCHLORIDE 0.5 MG: 1 INJECTION, SOLUTION INTRAMUSCULAR; INTRAVENOUS; SUBCUTANEOUS at 03:23

## 2023-01-27 RX ADMIN — DEXTROSE, SODIUM CHLORIDE, AND POTASSIUM CHLORIDE 100 ML/HR: 5; .9; .15 INJECTION INTRAVENOUS at 17:07

## 2023-01-27 RX ADMIN — DOXYLAMINE SUCCINATE 25 MG: 25 TABLET ORAL at 20:10

## 2023-01-27 RX ADMIN — ONDANSETRON HYDROCHLORIDE 4 MG: 2 SOLUTION INTRAMUSCULAR; INTRAVENOUS at 06:29

## 2023-01-27 RX ADMIN — METRONIDAZOLE 500 MG: 500 INJECTION, SOLUTION INTRAVENOUS at 06:30

## 2023-01-27 RX ADMIN — DEXTROSE, SODIUM CHLORIDE, AND POTASSIUM CHLORIDE 125 ML/HR: 5; .9; .15 INJECTION INTRAVENOUS at 14:47

## 2023-01-27 RX ADMIN — HYDROXYZINE HYDROCHLORIDE 25 MG: 10 SOLUTION ORAL at 09:27

## 2023-01-27 RX ADMIN — HYDROMORPHONE HYDROCHLORIDE 0.5 MG: 1 INJECTION, SOLUTION INTRAMUSCULAR; INTRAVENOUS; SUBCUTANEOUS at 14:47

## 2023-01-27 RX ADMIN — METRONIDAZOLE 500 MG: 500 INJECTION, SOLUTION INTRAVENOUS at 14:47

## 2023-01-27 RX ADMIN — ENOXAPARIN SODIUM 40 MG: 40 INJECTION SUBCUTANEOUS at 09:24

## 2023-01-27 RX ADMIN — HYDROMORPHONE HYDROCHLORIDE 0.5 MG: 1 INJECTION, SOLUTION INTRAMUSCULAR; INTRAVENOUS; SUBCUTANEOUS at 06:29

## 2023-01-27 RX ADMIN — ONDANSETRON HYDROCHLORIDE 4 MG: 2 SOLUTION INTRAMUSCULAR; INTRAVENOUS at 18:32

## 2023-01-27 RX ADMIN — LEVOFLOXACIN 750 MG: 5 INJECTION, SOLUTION INTRAVENOUS at 22:22

## 2023-01-27 RX ADMIN — HYDROMORPHONE HYDROCHLORIDE 0.5 MG: 1 INJECTION, SOLUTION INTRAMUSCULAR; INTRAVENOUS; SUBCUTANEOUS at 10:50

## 2023-01-27 RX ADMIN — HYDROMORPHONE HYDROCHLORIDE 0.5 MG: 1 INJECTION, SOLUTION INTRAMUSCULAR; INTRAVENOUS; SUBCUTANEOUS at 00:14

## 2023-01-27 RX ADMIN — DEXTROSE, SODIUM CHLORIDE, AND POTASSIUM CHLORIDE 125 ML/HR: 5; .9; .15 INJECTION INTRAVENOUS at 06:30

## 2023-01-27 NOTE — PLAN OF CARE
Problem: Potential for Falls  Goal: Patient will remain free of falls  Description: INTERVENTIONS:  - Educate patient/family on patient safety including physical limitations  - Instruct patient to call for assistance with activity   - Consult OT/PT to assist with strengthening/mobility   - Keep Call bell within reach  - Keep bed low and locked with side rails adjusted as appropriate  - Keep care items and personal belongings within reach  - Initiate and maintain comfort rounds  - Make Fall Risk Sign visible to staff  - Offer Toileting every 2 Hours, in advance of need  - Apply yellow socks and bracelet for high fall risk patients  - Consider moving patient to room near nurses station  Outcome: Progressing     Problem: PAIN - ADULT  Goal: Verbalizes/displays adequate comfort level or baseline comfort level  Description: Interventions:  - Encourage patient to monitor pain and request assistance  - Assess pain using appropriate pain scale  - Administer analgesics based on type and severity of pain and evaluate response  - Implement non-pharmacological measures as appropriate and evaluate response  - Consider cultural and social influences on pain and pain management  - Notify physician/advanced practitioner if interventions unsuccessful or patient reports new pain  Outcome: Progressing     Problem: DISCHARGE PLANNING  Goal: Discharge to home or other facility with appropriate resources  Description: INTERVENTIONS:  - Identify barriers to discharge w/patient and caregiver  - Arrange for needed discharge resources and transportation as appropriate  - Identify discharge learning needs (meds, wound care, etc )  - Arrange for interpretive services to assist at discharge as needed  - Refer to Case Management Department for coordinating discharge planning if the patient needs post-hospital services based on physician/advanced practitioner order or complex needs related to functional status, cognitive ability, or social support system  Outcome: Progressing     Problem: GASTROINTESTINAL - ADULT  Goal: Minimal or absence of nausea and/or vomiting  Description: INTERVENTIONS:  - Administer IV fluids if ordered to ensure adequate hydration  - Maintain NPO status until nausea and vomiting are resolved  - Nasogastric tube if ordered  - Administer ordered antiemetic medications as needed  - Provide nonpharmacologic comfort measures as appropriate  - Advance diet as tolerated, if ordered  - Consider nutrition services referral to assist patient with adequate nutrition and appropriate food choices  Outcome: Progressing  Goal: Maintains adequate nutritional intake  Description: INTERVENTIONS:  - Monitor percentage of each meal consumed  - Identify factors contributing to decreased intake, treat as appropriate  - Assist with meals as needed  - Monitor I&O, weight, and lab values if indicated  - Obtain nutrition services referral as needed  Outcome: Progressing     Problem: METABOLIC, FLUID AND ELECTROLYTES - ADULT  Goal: Fluid balance maintained  Description: INTERVENTIONS:  - Monitor labs   - Monitor I/O and WT  - Instruct patient on fluid and nutrition as appropriate  - Assess for signs & symptoms of volume excess or deficit  Outcome: Progressing     Problem: MOBILITY - ADULT  Goal: Maintain or return to baseline ADL function  Description: INTERVENTIONS:  -  Assess patient's ability to carry out ADLs; assess patient's baseline for ADL function and identify physical deficits which impact ability to perform ADLs (bathing, care of mouth/teeth, toileting, grooming, dressing, etc )  - Assess/evaluate cause of self-care deficits   - Assess range of motion  - Assess patient's mobility; develop plan if impaired  - Assess patient's need for assistive devices and provide as appropriate  - Encourage maximum independence but intervene and supervise when necessary  - Involve family in performance of ADLs  - Assess for home care needs following discharge - Consider OT consult to assist with ADL evaluation and planning for discharge  - Provide patient education as appropriate  Outcome: Progressing  Goal: Maintains/Returns to pre admission functional level  Description: INTERVENTIONS:  - Perform BMAT or MOVE assessment daily    - Set and communicate daily mobility goal to care team and patient/family/caregiver  - Collaborate with rehabilitation services on mobility goals if consulted  - Perform Range of Motion 3 times a day  - Reposition patient every 2 hours    - Dangle patient 3 times a day  - Stand patient 3 times a day  - Ambulate patient 3 times a day  - Out of bed to chair 3 times a day   - Out of bed for meals 3 times a day  - Out of bed for toileting  - Record patient progress and toleration of activity level   Outcome: Progressing

## 2023-01-27 NOTE — ASSESSMENT & PLAN NOTE
Done for EMILI 2-3, complicated by peritoneal defect discovered post op  Lab Results   Component Value Date/Time    HGB 11 1 (L) 01/30/2023 04:51 AM    HGB 10 8 (L) 01/29/2023 04:19 AM    HGB 11 4 (L) 01/28/2023 08:29 AM

## 2023-01-27 NOTE — PROGRESS NOTES
For questions/concerns on this patient, please reach out to the followin AM - 5 PM [facility]-GynOnc- Resident  5 PM - 6 AM: [facility]- Senior/Consult Resident  Gyn Oncology Progress note   Delroy Schwartz 50 y o  female MRN: [id number]  Unit/Bed#: E5 -01 Encounter: [id number]    Assessment/Plan:    Lady Darnell a 50 y o  POD#2 s/p diagnostic lap and pelvic washout who presents with intractable nausea and vomiting and abdominal distention with inability to tolerate solids and liquids after a CKC on   Pelvic fluid collection  Assessment & Plan  4 8 x 2 4cm fluid collection posterior to cervix concerning for pelvic abscess versus inflammatory changes postoperatively    : diagnostic lap, pelvic washout, and drain placement - pelvic abscess, small peritoneal defect    Vitals WNL, Procalcitonin 5 51, lactic acid WNL  WBC: 12 7 > 8 7 > 8 2 -> 5 9  Blood Cx #1 NGTD, Blood Cx#2 Staph, Tissue Cx: Polys  IV Levaquin 750mg qd and Flagyl 500mg q8h    S/P cone biopsy of cervix  Assessment & Plan  EMILI 2-3  Hgb 12 2 -> 10 8 -> 10 1    * Postoperative ileus (HCC)  Assessment & Plan  · CT abd/pelvis c/w postoperative ileus with multiple dilated loops of small bowel  · Intractable nausea/vomiting, not tolerating solids or liquids  · FEN: Dextrose 5% in NS with KCl, replete electrolytes PRN, NPO  · NGT to low intermittent suction, output overnight 125cc, will attempt clamp trial today         Subjective:    Delroy Schwartz is currently having worsening soreness which does improve with IV analgesics  Patient is currently voiding  She is ambulating  Patient is not currently passing flatus and has had no bowel movement  She is NPO, and denies nausea or vomitting  Patient denies fever, chills, chest pain, shortness of breath, or calf tenderness       Objective:  /75   Pulse 84   Temp 97 9 °F (36 6 °C)   Resp 18   Ht 5' 3" (1 6 m)   Wt 52 4 kg (115 lb 8 3 oz)   SpO2 96%   BMI 20 46 kg/m²     I/O last 3 completed shifts: In: 990 [I V :990]  Out: 1570 [Urine:1225; Emesis/NG output:200; Drains:145]  No intake/output data recorded  Lab Results   Component Value Date    WBC 5 90 01/27/2023    HGB 10 1 (L) 01/27/2023    HCT 31 0 (L) 01/27/2023    MCV 87 01/27/2023     (H) 01/27/2023       Lab Results   Component Value Date    GLUCOSE 76 04/06/2015    CALCIUM 7 7 (L) 01/27/2023     02/03/2016    K 3 6 01/27/2023    CO2 23 01/27/2023     (H) 01/27/2023    BUN 6 01/27/2023    CREATININE 0 64 01/27/2023         Physical Exam  Vitals reviewed  Constitutional:       General: She is not in acute distress  Appearance: Normal appearance  She is not toxic-appearing  HENT:      Head: Normocephalic and atraumatic  Mouth/Throat:      Comments: NGT in place  Eyes:      Extraocular Movements: Extraocular movements intact  Cardiovascular:      Rate and Rhythm: Normal rate and regular rhythm  Pulses: Normal pulses  Pulmonary:      Effort: Pulmonary effort is normal  No respiratory distress  Abdominal:      General: There is distension  Tenderness: There is no abdominal tenderness  There is no guarding  Comments: JOYCE drain RLQ, port sites C/DI   Musculoskeletal:         General: Normal range of motion  Cervical back: Normal range of motion  Skin:     General: Skin is warm and dry  Neurological:      General: No focal deficit present  Mental Status: She is alert  Mental status is at baseline     Psychiatric:         Mood and Affect: Mood normal          Behavior: Behavior normal            Garrett Elmore MD  1/27/2023  7:12 AM

## 2023-01-27 NOTE — NURSING NOTE
RN received report from off-going RN  I agree with previous RN assessment  Pt has had NGT removed by physician, she is much more comfortable  Both IV sites found to have infiltrated, IVs removed and will be replaced  No further questions or concerns from pt, she is resting comfortably in her chair with call bell in reach

## 2023-01-28 ENCOUNTER — APPOINTMENT (INPATIENT)
Dept: CT IMAGING | Facility: HOSPITAL | Age: 53
End: 2023-01-28

## 2023-01-28 PROBLEM — L03.90 CELLULITIS: Status: ACTIVE | Noted: 2023-01-28

## 2023-01-28 LAB
ALBUMIN SERPL BCP-MCNC: 2.9 G/DL (ref 3.5–5)
ALP SERPL-CCNC: 41 U/L (ref 34–104)
ALT SERPL W P-5'-P-CCNC: 5 U/L (ref 7–52)
ANION GAP SERPL CALCULATED.3IONS-SCNC: 6 MMOL/L (ref 4–13)
ANION GAP SERPL CALCULATED.3IONS-SCNC: 8 MMOL/L (ref 4–13)
AST SERPL W P-5'-P-CCNC: 11 U/L (ref 13–39)
BACTERIA BLD CULT: ABNORMAL
BILIRUB SERPL-MCNC: 0.54 MG/DL (ref 0.2–1)
BUN SERPL-MCNC: 4 MG/DL (ref 5–25)
BUN SERPL-MCNC: 4 MG/DL (ref 5–25)
CALCIUM ALBUM COR SERPL-MCNC: 9.2 MG/DL (ref 8.3–10.1)
CALCIUM SERPL-MCNC: 8.1 MG/DL (ref 8.4–10.2)
CALCIUM SERPL-MCNC: 8.3 MG/DL (ref 8.4–10.2)
CHLORIDE SERPL-SCNC: 107 MMOL/L (ref 96–108)
CHLORIDE SERPL-SCNC: 107 MMOL/L (ref 96–108)
CO2 SERPL-SCNC: 23 MMOL/L (ref 21–32)
CO2 SERPL-SCNC: 25 MMOL/L (ref 21–32)
CREAT SERPL-MCNC: 0.61 MG/DL (ref 0.6–1.3)
CREAT SERPL-MCNC: 0.61 MG/DL (ref 0.6–1.3)
ERYTHROCYTE [DISTWIDTH] IN BLOOD BY AUTOMATED COUNT: 14.2 % (ref 11.6–15.1)
GFR SERPL CREATININE-BSD FRML MDRD: 106 ML/MIN/1.73SQ M
GFR SERPL CREATININE-BSD FRML MDRD: 106 ML/MIN/1.73SQ M
GLUCOSE SERPL-MCNC: 116 MG/DL (ref 65–140)
GLUCOSE SERPL-MCNC: 120 MG/DL (ref 65–140)
GRAM STN SPEC: ABNORMAL
HCT VFR BLD AUTO: 34.3 % (ref 34.8–46.1)
HGB BLD-MCNC: 11.4 G/DL (ref 11.5–15.4)
MAGNESIUM SERPL-MCNC: 1.6 MG/DL (ref 1.9–2.7)
MCH RBC QN AUTO: 28.7 PG (ref 26.8–34.3)
MCHC RBC AUTO-ENTMCNC: 33.2 G/DL (ref 31.4–37.4)
MCV RBC AUTO: 86 FL (ref 82–98)
PLATELET # BLD AUTO: 607 THOUSANDS/UL (ref 149–390)
PMV BLD AUTO: 10.1 FL (ref 8.9–12.7)
POTASSIUM SERPL-SCNC: 3.6 MMOL/L (ref 3.5–5.3)
POTASSIUM SERPL-SCNC: 3.6 MMOL/L (ref 3.5–5.3)
PROT SERPL-MCNC: 5.2 G/DL (ref 6.4–8.4)
RBC # BLD AUTO: 3.97 MILLION/UL (ref 3.81–5.12)
SODIUM SERPL-SCNC: 138 MMOL/L (ref 135–147)
SODIUM SERPL-SCNC: 138 MMOL/L (ref 135–147)
WBC # BLD AUTO: 9.4 THOUSAND/UL (ref 4.31–10.16)

## 2023-01-28 RX ORDER — MAGNESIUM SULFATE HEPTAHYDRATE 40 MG/ML
4 INJECTION, SOLUTION INTRAVENOUS ONCE
Status: COMPLETED | OUTPATIENT
Start: 2023-01-28 | End: 2023-01-28

## 2023-01-28 RX ORDER — DEXTROSE, SODIUM CHLORIDE, AND POTASSIUM CHLORIDE 5; .9; .15 G/100ML; G/100ML; G/100ML
100 INJECTION INTRAVENOUS CONTINUOUS
Status: DISCONTINUED | OUTPATIENT
Start: 2023-01-28 | End: 2023-01-29

## 2023-01-28 RX ADMIN — SODIUM CHLORIDE 3 G: 9 INJECTION, SOLUTION INTRAVENOUS at 21:04

## 2023-01-28 RX ADMIN — SODIUM CHLORIDE 3 G: 9 INJECTION, SOLUTION INTRAVENOUS at 14:32

## 2023-01-28 RX ADMIN — ENOXAPARIN SODIUM 40 MG: 40 INJECTION SUBCUTANEOUS at 09:14

## 2023-01-28 RX ADMIN — DEXTROSE, SODIUM CHLORIDE, AND POTASSIUM CHLORIDE 100 ML/HR: 5; .9; .15 INJECTION INTRAVENOUS at 12:34

## 2023-01-28 RX ADMIN — ACETAMINOPHEN 650 MG: 325 TABLET ORAL at 00:26

## 2023-01-28 RX ADMIN — IOHEXOL 35 ML: 300 INJECTION, SOLUTION INTRAVENOUS at 11:39

## 2023-01-28 RX ADMIN — METRONIDAZOLE 500 MG: 500 INJECTION, SOLUTION INTRAVENOUS at 00:21

## 2023-01-28 RX ADMIN — METRONIDAZOLE 500 MG: 500 INJECTION, SOLUTION INTRAVENOUS at 06:04

## 2023-01-28 RX ADMIN — ACETAMINOPHEN 650 MG: 325 TABLET ORAL at 18:08

## 2023-01-28 RX ADMIN — DEXTROSE, SODIUM CHLORIDE, AND POTASSIUM CHLORIDE 100 ML/HR: 5; .9; .15 INJECTION INTRAVENOUS at 04:32

## 2023-01-28 RX ADMIN — MAGNESIUM SULFATE HEPTAHYDRATE 4 G: 40 INJECTION, SOLUTION INTRAVENOUS at 11:03

## 2023-01-28 RX ADMIN — ACETAMINOPHEN 650 MG: 325 TABLET ORAL at 06:02

## 2023-01-28 RX ADMIN — IOHEXOL 100 ML: 350 INJECTION, SOLUTION INTRAVENOUS at 11:40

## 2023-01-28 RX ADMIN — ONDANSETRON HYDROCHLORIDE 4 MG: 2 SOLUTION INTRAMUSCULAR; INTRAVENOUS at 09:09

## 2023-01-28 NOTE — PLAN OF CARE
Problem: Potential for Falls  Goal: Patient will remain free of falls  Description: INTERVENTIONS:  - Educate patient/family on patient safety including physical limitations  - Instruct patient to call for assistance with activity   - Consult OT/PT to assist with strengthening/mobility   - Keep Call bell within reach  - Keep bed low and locked with side rails adjusted as appropriate  - Keep care items and personal belongings within reach  - Initiate and maintain comfort rounds  - Make Fall Risk Sign visible to staff  - Apply yellow socks and bracelet for high fall risk patients  - Consider moving patient to room near nurses station  Outcome: Progressing     Problem: PAIN - ADULT  Goal: Verbalizes/displays adequate comfort level or baseline comfort level  Description: Interventions:  - Encourage patient to monitor pain and request assistance  - Assess pain using appropriate pain scale  - Administer analgesics based on type and severity of pain and evaluate response  - Implement non-pharmacological measures as appropriate and evaluate response  - Consider cultural and social influences on pain and pain management  - Notify physician/advanced practitioner if interventions unsuccessful or patient reports new pain  Outcome: Progressing     Problem: DISCHARGE PLANNING  Goal: Discharge to home or other facility with appropriate resources  Description: INTERVENTIONS:  - Identify barriers to discharge w/patient and caregiver  - Arrange for needed discharge resources and transportation as appropriate  - Identify discharge learning needs (meds, wound care, etc )  - Arrange for interpretive services to assist at discharge as needed  - Refer to Case Management Department for coordinating discharge planning if the patient needs post-hospital services based on physician/advanced practitioner order or complex needs related to functional status, cognitive ability, or social support system  Outcome: Progressing     Problem: GASTROINTESTINAL - ADULT  Goal: Minimal or absence of nausea and/or vomiting  Description: INTERVENTIONS:  - Administer IV fluids if ordered to ensure adequate hydration  - Maintain NPO status until nausea and vomiting are resolved  - Nasogastric tube if ordered  - Administer ordered antiemetic medications as needed  - Provide nonpharmacologic comfort measures as appropriate  - Advance diet as tolerated, if ordered  - Consider nutrition services referral to assist patient with adequate nutrition and appropriate food choices  Outcome: Progressing  Goal: Maintains adequate nutritional intake  Description: INTERVENTIONS:  - Monitor percentage of each meal consumed  - Identify factors contributing to decreased intake, treat as appropriate  - Assist with meals as needed  - Monitor I&O, weight, and lab values if indicated  - Obtain nutrition services referral as needed  Outcome: Progressing     Problem: METABOLIC, FLUID AND ELECTROLYTES - ADULT  Goal: Fluid balance maintained  Description: INTERVENTIONS:  - Monitor labs   - Monitor I/O and WT  - Instruct patient on fluid and nutrition as appropriate  - Assess for signs & symptoms of volume excess or deficit  Outcome: Progressing     Problem: MOBILITY - ADULT  Goal: Maintain or return to baseline ADL function  Description: INTERVENTIONS:  -  Assess patient's ability to carry out ADLs; assess patient's baseline for ADL function and identify physical deficits which impact ability to perform ADLs (bathing, care of mouth/teeth, toileting, grooming, dressing, etc )  - Assess/evaluate cause of self-care deficits   - Assess range of motion  - Assess patient's mobility; develop plan if impaired  - Assess patient's need for assistive devices and provide as appropriate  - Encourage maximum independence but intervene and supervise when necessary  - Involve family in performance of ADLs  - Assess for home care needs following discharge   - Consider OT consult to assist with ADL evaluation and planning for discharge  - Provide patient education as appropriate  Outcome: Progressing  Goal: Maintains/Returns to pre admission functional level  Description: INTERVENTIONS:  - Perform BMAT or MOVE assessment daily    - Set and communicate daily mobility goal to care team and patient/family/caregiver  - Collaborate with rehabilitation services on mobility goals if consulted  - Out of bed for toileting  - Record patient progress and toleration of activity level   Outcome: Progressing     Problem: Nutrition/Hydration-ADULT  Goal: Nutrient/Hydration intake appropriate for improving, restoring or maintaining nutritional needs  Description: Monitor and assess patient's nutrition/hydration status for malnutrition  Collaborate with interdisciplinary team and initiate plan and interventions as ordered  Monitor patient's weight and dietary intake as ordered or per policy  Utilize nutrition screening tool and intervene as necessary  Determine patient's food preferences and provide high-protein, high-caloric foods as appropriate       INTERVENTIONS:  - Monitor oral intake, urinary output, labs, and treatment plans  - Assess nutrition and hydration status and recommend course of action  - Evaluate amount of meals eaten  - Assist patient with eating if necessary   - Allow adequate time for meals  - Recommend/ encourage appropriate diets, oral nutritional supplements, and vitamin/mineral supplements  - Order, calculate, and assess calorie counts as needed  - Recommend, monitor, and adjust tube feedings and TPN/PPN based on assessed needs  - Assess need for intravenous fluids  - Provide specific nutrition/hydration education as appropriate  - Include patient/family/caregiver in decisions related to nutrition  Outcome: Progressing

## 2023-01-28 NOTE — CONSULTS
Consultation - Infectious Disease   Ynes Clink 50 y o  female MRN: [id number]  Unit/Bed#: E5 -01 Encounter: [id number]      IMPRESSION & RECOMMENDATIONS:     1  Pelvic Abscess:  - Secondary to recent cone biopsy procedure on 1/20  S/p OR washout on 1/25 with evidence of small defect in posterior cul-de-sac and contiguous abscess formation involving this area and left pelvic sidewall and left ovarian fossa  Drain was placed  OR cutlues have grown mixed skin aaron, gram stain with 2+ GPCs in pairs and rare GPRs  - will transition to IV Unasyn to cover GI/ aaron and anaerobes  - She has cephalexin allergy of rash only, and she confirmed has tolerated Amoxicillin in past  - follow up final anaerobic culture  - follow up repeat CT a/p   - repeat CBC tomorrow to trend WBC  - duration of antibiotics pending repeat CT/degree of source control  - hope to transition to PO Augmentin once she is able to tolerate PO better, currently with active emesis     2  SIRS, Present on Admission: leukocytosis, tachycardia  - likely due to #1  She is afebrile currently with normal WBC  - antibiotics as above  - trend fever curve  - trend WBC  - consider RUQ US as below  - follow up repeat CT a/p as above    3  Coag Negative Staph in Blood cx:  - Grew in 1/2 sets from 1/24  - likely skin contamination    4  High density material within gallbladder:  - seen on CT 1/24, with small pericholecystic fluid  New commpared to 1/22 CT  She does now have some nausea/emesis  - would repeat LFTs today  - follow up repeat CT a/p  - consider dedicated RUQ US    5  Splenomegaly:  - noted on CT  - possible in setting of malignancy?  - no history of liver disease     6  Right Sided Stage 1B Breast Cancer s/p lumpectomy and reconstruction:  - noted  - not on chemo or radiation actively    7  Cephalexin allergy:  - Reported as rash on chest, no swelling or breathing difficulties  She says she has tolerated Amoxicillin before       I have discussed the above management plan in detail with the primary service    I have performed an extensive review of the medical records in [facility] [address] including review of the notes, radiographs, and laboratory results     HISTORY OF PRESENT ILLNESS:  Reason for Consult: pelvic abscess    HPI: Shashi Kaur is a 50y o  year old female PMHx stage IB right Breast CA s/p lumpectomy and reconstruction, Hx of HPV s/p recent cold knife cone with OBGYN  She was found last year on colposcopy to have high grade cervical lesion concerning for HPV related dysplasia  She underwent cold knife cone biopsy on 1/20/23 with OBGYN then later presented to ED on 1/22 with abdominal pain  CT at that time showed small volume free pelvic fluid, felt to be physiologic, and mild bladder thickening  She was discharged home then re-presented on 1/24 as she was unable to tolerate PO and had ongoing abdominal pain and distention  Repeat CT 1/24 showed dilation of multiple small bowel loops with moderate fluid with distention at the level of ascending colon, concern for possible SBO  Also noted small fluid collection with enhancing borders posterior to cervix, 4 8 X 2 4 cm  Concerns were for pelvic abscess given patient's leukocytosis  IR was consulted but the collection not amenable to drainage given no safe window  Patient taken to OR on 1/25 for laparoscopy and washout  Noted pelvic abscess, related to cone biopsy, with small defect in posterior cul-de-sac that was contiguous abscess formation  Patient reports now having emesis and nausea that is new  She reports did have some erythema on abdomen that developed post surgery but now resolved  Abd pain present but controlled  No cough, SOB, fevers  Main complaint is her emesis  She does report at home she throws up when takes pills without food  REVIEW OF SYSTEMS:  A complete review of systems is negative other than that noted in the HPI      PAST MEDICAL HISTORY:  Past Medical History: Diagnosis Date   • Anxiety    • Bacterial vaginosis    • Cancer [facility])     right breast-  2008  3 lumpectomies/reconstruction   • H/O bilateral breast implants    • History of infection due to human papilloma virus (HPV)     last assessed - [id number]   • HPV (human papilloma virus) infection     cold knife endometrial curettage  today   1/20/2023   • Hx of radiation therapy    • BALDEMAR-2 gene mutation    • Moderate dysplasia of cervix (EMILI II)     last assessed - [id number]   • Ovarian cyst     last assessed - [id number]   • Pap smear abnormality of cervix with ASCUS favoring dysplasia     last assessed - [id number]   • PONV (postoperative nausea and vomiting)    • Secondary amenorrhea    • Thrombocytosis    • Use of tamoxifen (Nolvadex)     last assessed - [id number]   • Varicella      Past Surgical History:   Procedure Laterality Date   • BREAST LUMPECTOMY Right     last assessed - [id number]   • BREAST LUMPECTOMY Right 05/11/2009   • BREAST LUMPECTOMY Right 06/05/2009   • BREAST RECONSTRUCTION Bilateral 10/2018    bilateral implants   • BREAST RECONSTRUCTION Right 02/2019    right implant   • CERVICAL BIOPSY N/A 1/20/2023    Procedure: COLD KNIFE CONE, ENDOMETRIAL CURETTAGE;  Surgeon: Lulú Tobin MD;  Location: [facility];  Service: Gynecology Oncology   • CERVICAL BIOPSY  W/ LOOP ELECTRODE EXCISION     • COLPOSCOPY W/ BIOPSY / CURETTAGE      Colposcopy Cervix with Biopsy(s) with Endocervical Currettage   • CYSTOSCOPY N/A 1/25/2023    Procedure: Freddy Marie;  Surgeon: Fidel Andrade MD;  Location: [facility];  Service: Gynecology Oncology   • MASTECTOMY, PARTIAL Right 05/11/2009    right partial mastectomy re-excision 6/26/09   • AR LAPS ABD PRTM&OMENTUM DX W/WO SPEC BR/WA SPX N/A 1/25/2023    Procedure: LAPAROSCOPY DIAGNOSTIC, PELVIC WASHOUT AND PELVIC DRAIN PLACEMENT;  Surgeon: Fidel Andrade MD;  Location[facility];  Service: Gynecology Oncology   • SENTINEL LYMPH NODE BIOPSY Right        FAMILY HISTORY:  Non-contributory    SOCIAL HISTORY:  Social History   Social History     Substance and Sexual Activity   Alcohol Use Yes   • Alcohol/week: 2 0 standard drinks   • Types: 2 Cans of beer per week    Comment: 4  x   weekly     Social History     Substance and Sexual Activity   Drug Use No     Social History     Tobacco Use   Smoking Status Former   • Packs/day: 0 25   • Years: 26 00   • Pack years: 6 50   • Types: Cigarettes   • Quit date:    • Years since quittin 0   Smokeless Tobacco Never   Tobacco Comments    former social smoker       ALLERGIES:  Allergies   Allergen Reactions   • Cephalexin Rash       MEDICATIONS:  All current active medications have been reviewed  PHYSICAL EXAM:  Temp:  [97 3 °F (36 3 °C)-98 3 °F (36 8 °C)] 98 3 °F (36 8 °C)  HR:  [77-80] 77  Resp:  [16-18] 16  BP: (117-119)/(79-80) 117/80  SpO2:  [96 %-98 %] 96 %  Temp (24hrs), Av 8 °F (36 6 °C), Min:97 3 °F (36 3 °C), Max:98 3 °F (36 8 °C)  Current: Temperature: 98 3 °F (36 8 °C)    Intake/Output Summary (Last 24 hours) at 2023 0800  Last data filed at 2023 0601  Gross per 24 hour   Intake 990 ml   Output 1500 ml   Net -510 ml       General Appearance:  Appearing well, nontoxic, and in no distress   Head:  Normocephalic, without obvious abnormality, atraumatic   Eyes:  Conjunctiva pink and sclera anicteric, both eyes   Nose: Nares normal, mucosa normal, no drainage   Throat: Oropharynx moist without lesions   Neck: Supple, symmetrical, no adenopathy, no tenderness/mass/nodules   Back:   Symmetric, no curvature, ROM normal, no CVA tenderness   Lungs:   Clear to auscultation bilaterally, respirations unlabored   Chest Wall:  No tenderness or deformity   Heart:  RRR; no murmur, rub or gallop   Abdomen:   Soft, non-tender, RLQ drain in place, no erythema present currently    Extremities: No cyanosis, clubbing or edema   Skin: No rashes or lesions  No draining wounds noted     Lymph nodes: Cervical, supraclavicular nodes normal   Neurologic: Alert and oriented       LABS, IMAGING, & OTHER STUDIES:  Lab Results:  I have personally reviewed pertinent labs  Results from last 7 days   Lab Units 01/27/23  0450 01/26/23  0551 01/25/23  0454   WBC Thousand/uL 5 90 8 20 8 73   HEMOGLOBIN g/dL 10 1* 10 8* 10 5*   PLATELETS Thousands/uL 458* 443* 479*     Results from last 7 days   Lab Units 01/27/23  0450 01/26/23  0551 01/25/23  0454 01/24/23  1744   SODIUM mmol/L 141 137 135 134*   POTASSIUM mmol/L 3 6 4 0 3 4* 4 1   CHLORIDE mmol/L 114* 108 106 100   CO2 mmol/L 23 22 22 22   BUN mg/dL 6 5 9 10   CREATININE mg/dL 0 64 0 63 0 56* 0 75   EGFR ml/min/1 73sq m 104 104 109 93   CALCIUM mg/dL 7 7* 8 1* 8 2* 9 2   AST U/L 7* 7*  --  20   ALT U/L 4* 5*  --  9   ALK PHOS U/L 33* 39  --  65     Results from last 7 days   Lab Units 01/25/23  1642 01/24/23  1754 01/24/23  1744   BLOOD CULTURE   --  No Growth at 72 hrs  Staphylococcus coagulase negative*   GRAM STAIN RESULT  1+ Polys*  2+ Gram positive cocci in pairs*  Rare Gram positive rods*  --  Gram positive cocci in clusters*     Results from last 7 days   Lab Units 01/24/23  1744   PROCALCITONIN ng/ml 5 51*         Imaging Studies:   I have personally reviewed pertinent imaging study reports and images in PACS  CT a/p 1/24:     1  Inflammatory changes centered at the level of the cervix with wall thickening involving small bowel loops especially at the level of the lower pelvis which could be related to inflammation or infection as well as a fluid collections/pockets of   ascites in the cul-de-sac with associated enhancement  Dilatation of the small bowel loops could reflect a developing ileus or partial obstruction  2   Development of bilateral small pleural effusions, pericardial effusion and ascites  3   No change in splenomegaly with splenic varices and enlargement of the portal vein    4   High density material in the gallbladder which could be related to vicarious excretion of contrast versus development of biliary sludge        Right upper quadrant ultrasound can be obtained if there is clinical concern for acute cholecystitis  5   Mild wall thickening of the urinary bladder which could reflect cystitis or can be secondary to the inflammatory process in the pelvis  Other Studies:   I have personally reviewed pertinent reports        Gabbie Chanel MD  [facility]

## 2023-01-28 NOTE — PROGRESS NOTES
For questions/concerns on this patient, please reach out to the followin AM - 5 PM [facility]Gyn[facility]- Resident  5 PM - 6 AM: [facility]- Senior/Consult Resident  Gyn Oncology Progress note   Leyda Jason 50 y o  female MRN: [id number]  Unit/Bed#: E5 -01 Encounter: [id number]    Assessment/Plan:    Rosalva watts 50 y o  POD#2 s/p diagnostic lap and pelvic washout who presents with intractable nausea and vomiting and abdominal distention with inability to tolerate solids and liquids after a CKC on       Pelvic fluid collection  Assessment & Plan  · 4 8 x 2 4cm fluid collection posterior to cervix concerning for pelvic abscess versus inflammatory changes postoperatively  · : diagnostic lap, pelvic washout, and drain placement - pelvic abscess, small peritoneal defect  · Blood Cx #1 NGTD, Blood Cx#2 coag neg staph, Tissue Cx: gram + cocci in pairs (strep)  · Current abx: IV Levaquin 750mg qd and Flagyl 500mg q8h  Lab Results   Component Value Date/Time    WBC 9 40 2023 08:29 AM    WBC 5 90 2023 04:50 AM    WBC 8 20 2023 05:51 AM     (H) 2023 08:29 AM     (H) 2023 04:50 AM     (H) 2023 05:51 AM     RLQ drain with 100cc serous output last 24 hours  ID consulted, appreciate recs today  Slight increase in WBC and platelets, repeat CTAP with PO and IV contrast ordered    Cellulitis  Assessment & Plan  Area of erythema with warmth on mons and right thigh outlined on , resolved as of   ID consulted for additional input    S/P cone biopsy of cervix  Assessment & Plan  Done for EMILI 2-3, complicated by peritoneal defect discovered post op  Lab Results   Component Value Date/Time    HGB 11 4 (L) 2023 08:29 AM    HGB 10 1 (L) 2023 04:50 AM    HGB 10 8 (L) 2023 05:51 AM       * Postoperative ileus ([address] )  Assessment & Plan  · CT abd/pelvis on admit c/w postoperative ileus with multiple dilated loops of small bowel  · Pain: venus tylenol, PRN Toradol 15mg, 0 5mg Dilaudid for breakthrough  · FEN: Dextrose 5% in NS with KCl, replete electrolytes PRN, NPO  · NGT removed yesterday afternoon, now having nausea and emesis this morning following progression to sips, will go back to NPO and follow up repeat CT scan       Subjective:    Prasad Alanis did well overnight but now is having nausea with emesis since progressing her diet to sips this morning  Her pain has been persistent but improved with meds  Patient is currently voiding  She is ambulating  Patient is not currently passing flatus and has had no bowel movement  She will now be NPO  Patient denies fever, chills, chest pain, shortness of breath, or calf tenderness  Objective:  /80   Pulse 77   Temp 98 3 °F (36 8 °C)   Resp 16   Ht 5' 3" (1 6 m)   Wt 52 4 kg (115 lb 8 3 oz)   SpO2 96%   BMI 20 46 kg/m²     I/O last 3 completed shifts: In: 1980 [I V :1980]  Out: 1955 [Urine:1750; Emesis/NG output:125; Drains:80]  No intake/output data recorded  Lab Results   Component Value Date    WBC 9 40 01/28/2023    HGB 11 4 (L) 01/28/2023    HCT 34 3 (L) 01/28/2023    MCV 86 01/28/2023     (H) 01/28/2023       Lab Results   Component Value Date    GLUCOSE 76 04/06/2015    CALCIUM 8 1 (L) 01/28/2023     02/03/2016    K 3 6 01/28/2023    CO2 25 01/28/2023     01/28/2023    BUN 4 (L) 01/28/2023    CREATININE 0 61 01/28/2023         Physical Exam  Vitals reviewed  Constitutional:       General: She is not in acute distress  Appearance: Normal appearance  She is ill-appearing (actively vomiting)  She is not toxic-appearing  HENT:      Head: Normocephalic and atraumatic  Eyes:      Extraocular Movements: Extraocular movements intact  Cardiovascular:      Rate and Rhythm: Normal rate and regular rhythm  Pulses: Normal pulses  Pulmonary:      Effort: Pulmonary effort is normal  No respiratory distress  Abdominal:      General: There is distension  Tenderness: There is abdominal tenderness  There is no guarding  Comments: Distension has worsened since previous exam, JOYCE drain RLQ, port sites C/D/I   Musculoskeletal:         General: Normal range of motion  Cervical back: Normal range of motion  Skin:     General: Skin is warm and dry  Neurological:      General: No focal deficit present  Mental Status: She is alert  Mental status is at baseline     Psychiatric:         Mood and Affect: Mood normal          Behavior: Behavior normal            Marlene Scott MD  1/28/2023  9:27 AM

## 2023-01-29 ENCOUNTER — APPOINTMENT (INPATIENT)
Dept: ULTRASOUND IMAGING | Facility: HOSPITAL | Age: 53
End: 2023-01-29

## 2023-01-29 LAB
ANION GAP SERPL CALCULATED.3IONS-SCNC: 4 MMOL/L (ref 4–13)
BACTERIA BLD CULT: NORMAL
BUN SERPL-MCNC: 3 MG/DL (ref 5–25)
CALCIUM SERPL-MCNC: 7.6 MG/DL (ref 8.4–10.2)
CHLORIDE SERPL-SCNC: 109 MMOL/L (ref 96–108)
CO2 SERPL-SCNC: 26 MMOL/L (ref 21–32)
CREAT SERPL-MCNC: 0.63 MG/DL (ref 0.6–1.3)
ERYTHROCYTE [DISTWIDTH] IN BLOOD BY AUTOMATED COUNT: 14.2 % (ref 11.6–15.1)
GFR SERPL CREATININE-BSD FRML MDRD: 104 ML/MIN/1.73SQ M
GLUCOSE SERPL-MCNC: 120 MG/DL (ref 65–140)
HCT VFR BLD AUTO: 32.9 % (ref 34.8–46.1)
HGB BLD-MCNC: 10.8 G/DL (ref 11.5–15.4)
MAGNESIUM SERPL-MCNC: 2.1 MG/DL (ref 1.9–2.7)
MCH RBC QN AUTO: 28.5 PG (ref 26.8–34.3)
MCHC RBC AUTO-ENTMCNC: 32.8 G/DL (ref 31.4–37.4)
MCV RBC AUTO: 87 FL (ref 82–98)
PLATELET # BLD AUTO: 591 THOUSANDS/UL (ref 149–390)
PMV BLD AUTO: 9.8 FL (ref 8.9–12.7)
POTASSIUM SERPL-SCNC: 3.9 MMOL/L (ref 3.5–5.3)
RBC # BLD AUTO: 3.79 MILLION/UL (ref 3.81–5.12)
SODIUM SERPL-SCNC: 139 MMOL/L (ref 135–147)
WBC # BLD AUTO: 8.27 THOUSAND/UL (ref 4.31–10.16)

## 2023-01-29 RX ORDER — IBUPROFEN 600 MG/1
600 TABLET ORAL EVERY 6 HOURS PRN
Status: DISCONTINUED | OUTPATIENT
Start: 2023-01-29 | End: 2023-01-30 | Stop reason: HOSPADM

## 2023-01-29 RX ADMIN — ACETAMINOPHEN 650 MG: 325 TABLET ORAL at 00:39

## 2023-01-29 RX ADMIN — SODIUM CHLORIDE 3 G: 9 INJECTION, SOLUTION INTRAVENOUS at 21:03

## 2023-01-29 RX ADMIN — ENOXAPARIN SODIUM 40 MG: 40 INJECTION SUBCUTANEOUS at 09:08

## 2023-01-29 RX ADMIN — SODIUM CHLORIDE 3 G: 9 INJECTION, SOLUTION INTRAVENOUS at 14:08

## 2023-01-29 RX ADMIN — DEXTROSE, SODIUM CHLORIDE, AND POTASSIUM CHLORIDE 100 ML/HR: 5; .9; .15 INJECTION INTRAVENOUS at 01:22

## 2023-01-29 RX ADMIN — SODIUM CHLORIDE 3 G: 9 INJECTION, SOLUTION INTRAVENOUS at 09:06

## 2023-01-29 RX ADMIN — ACETAMINOPHEN 650 MG: 325 TABLET ORAL at 18:18

## 2023-01-29 RX ADMIN — SODIUM CHLORIDE 3 G: 9 INJECTION, SOLUTION INTRAVENOUS at 04:04

## 2023-01-29 RX ADMIN — ACETAMINOPHEN 650 MG: 325 TABLET ORAL at 12:09

## 2023-01-29 NOTE — PROGRESS NOTES
For questions/concerns on this patient, please reach out to the followin AM - 5 PM [facility]- Resident  5 PM - 6 AM: [facility]- Senior/Consult Resident  Gyn Oncology Progress note   Aníbal Price 50 y o  female MRN: [id number]  Unit/Bed#: E5 -01 Encounter: [id number]    Assessment/Plan:    Dayo watts 50 y o  POD#3 s/p diagnostic lap and pelvic washout due to ileus and pelvic abscess s/p CKC on   Stable and slowly regaining bowel function      Pelvic fluid collection  Assessment & Plan  · 4 8 x 2 4cm fluid collection posterior to cervix concerning for pelvic abscess versus inflammatory changes postoperatively  · : diagnostic lap, pelvic washout, and drain placement - pelvic abscess, small peritoneal defect  · Blood Cx #1 NGTD, Blood Cx#2 coag neg staph, Tissue Cx: gram + cocci in pairs (strep)  · Current abx:  -> per ID, narrowed to Unasyn 3g q6  Lab Results   Component Value Date/Time    WBC 8 27 2023 04:19 AM    WBC 9 40 2023 08:29 AM    WBC 5 90 2023 04:50 AM     (H) 2023 04:19 AM     (H) 2023 08:29 AM     (H) 2023 04:50 AM     RLQ drain with 10cc serous output last 24 hours, can consider removal  ID consulted, appreciate recs   Repeat CTAP with PO and IV contrast on  with minimal change, ileus with drain tip in pelvis    Cellulitis  Assessment & Plan  Area of erythema with warmth on mons and right thigh outlined on , resolved as of     S/P cone biopsy of cervix  Assessment & Plan  Done for EMILI 2-3, complicated by peritoneal defect discovered post op  Lab Results   Component Value Date/Time    HGB 10 8 (L) 2023 04:19 AM    HGB 11 4 (L) 2023 08:29 AM    HGB 10 1 (L) 2023 04:50 AM       * Postoperative ileus ([address] )  Assessment & Plan  · CT abd/pelvis on admit c/w postoperative ileus with multiple dilated loops of small bowel, s/p NG tube  · Pain: venus tylenol, PRN motrin  · FEN: replete electrolytes PRN, clear liquid diet, advance as tolerated       Subjective:    Irina Moody did well overnight with multiple episodes of flatus  She has tolerated sips of water and apple juice  Her pain has improved and now only requires tylenol  Patient is currently voiding  She is ambulating  Patient is currently passing flatus and has had no bowel movement  Patient denies fever, chills, chest pain, shortness of breath, or calf tenderness  Objective:  /76 (BP Location: Left arm)   Pulse 82   Temp 98 °F (36 7 °C) (Oral)   Resp 18   Ht 5' 3" (1 6 m)   Wt 52 4 kg (115 lb 8 3 oz)   SpO2 94%   BMI 20 46 kg/m²     I/O last 3 completed shifts:  In: -   Out: 1380 [Urine:1300; Emesis/NG output:50; Drains:30]  No intake/output data recorded  Lab Results   Component Value Date    WBC 8 27 01/29/2023    HGB 10 8 (L) 01/29/2023    HCT 32 9 (L) 01/29/2023    MCV 87 01/29/2023     (H) 01/29/2023       Lab Results   Component Value Date    GLUCOSE 76 04/06/2015    CALCIUM 7 6 (L) 01/29/2023     02/03/2016    K 3 9 01/29/2023    CO2 26 01/29/2023     (H) 01/29/2023    BUN 3 (L) 01/29/2023    CREATININE 0 63 01/29/2023         Physical Exam  Vitals reviewed  Constitutional:       General: She is not in acute distress  Appearance: Normal appearance  She is not toxic-appearing  HENT:      Head: Normocephalic and atraumatic  Eyes:      Extraocular Movements: Extraocular movements intact  Cardiovascular:      Rate and Rhythm: Normal rate and regular rhythm  Pulses: Normal pulses  Pulmonary:      Effort: Pulmonary effort is normal  No respiratory distress  Abdominal:      General: There is distension  Comments: JOYCE drain RLQ, port sites C/D/I   Musculoskeletal:         General: Normal range of motion  Cervical back: Normal range of motion  Skin:     General: Skin is warm and dry  Neurological:      General: No focal deficit present  Mental Status: She is alert   Mental status is at baseline     Psychiatric:         Mood and Affect: Mood normal          Behavior: Behavior normal            Lane Bueno MD  1/29/2023  8:40 AM

## 2023-01-29 NOTE — TREATMENT PLAN
Infectious Diseases Chart Note:  - CT reviewed, appears abscess has resolved with drainage catheter still in place, tip in the pelvis  - still presence of ileus, likely etiology of nausea/emesis  - Cultures reviewed, anaerobic culture with Prevotella and Veillonella  - For now would continue IV Unasyn as should cover all isolates in cultures  - eventually when able to better tolerate PO, may be able to transition to oral Augmentin and complete tentative 7-10 days from OR  - would follow up RUQ US for better assessment of gallbladder; CT 1/24 had noted biliary sludge and pericholecystic fluid, though this fluid appears to have resolved on more recent CT 1/28  - ID will see again on 1/30    Plan and recommendations were discussed with primary team       Sarahi Nichols MD

## 2023-01-30 ENCOUNTER — DOCUMENTATION (OUTPATIENT)
Dept: CARDIAC SURGERY | Facility: CLINIC | Age: 53
End: 2023-01-30

## 2023-01-30 VITALS
HEART RATE: 72 BPM | RESPIRATION RATE: 16 BRPM | BODY MASS INDEX: 20.47 KG/M2 | OXYGEN SATURATION: 98 % | DIASTOLIC BLOOD PRESSURE: 86 MMHG | SYSTOLIC BLOOD PRESSURE: 130 MMHG | WEIGHT: 115.52 LBS | HEIGHT: 63 IN | TEMPERATURE: 97.8 F

## 2023-01-30 LAB
ANION GAP SERPL CALCULATED.3IONS-SCNC: 7 MMOL/L (ref 4–13)
BACTERIA BLD CULT: ABNORMAL
BUN SERPL-MCNC: 5 MG/DL (ref 5–25)
CALCIUM SERPL-MCNC: 7.8 MG/DL (ref 8.4–10.2)
CHLORIDE SERPL-SCNC: 106 MMOL/L (ref 96–108)
CO2 SERPL-SCNC: 26 MMOL/L (ref 21–32)
CREAT SERPL-MCNC: 0.63 MG/DL (ref 0.6–1.3)
ERYTHROCYTE [DISTWIDTH] IN BLOOD BY AUTOMATED COUNT: 14.2 % (ref 11.6–15.1)
GFR SERPL CREATININE-BSD FRML MDRD: 104 ML/MIN/1.73SQ M
GLUCOSE SERPL-MCNC: 89 MG/DL (ref 65–140)
GRAM STN SPEC: ABNORMAL
HCT VFR BLD AUTO: 33.7 % (ref 34.8–46.1)
HGB BLD-MCNC: 11.1 G/DL (ref 11.5–15.4)
MAGNESIUM SERPL-MCNC: 2.1 MG/DL (ref 1.9–2.7)
MCH RBC QN AUTO: 28.5 PG (ref 26.8–34.3)
MCHC RBC AUTO-ENTMCNC: 32.9 G/DL (ref 31.4–37.4)
MCV RBC AUTO: 86 FL (ref 82–98)
PLATELET # BLD AUTO: 549 THOUSANDS/UL (ref 149–390)
PMV BLD AUTO: 9.8 FL (ref 8.9–12.7)
POTASSIUM SERPL-SCNC: 3.7 MMOL/L (ref 3.5–5.3)
RBC # BLD AUTO: 3.9 MILLION/UL (ref 3.81–5.12)
S AUREUS+CONS DNA BLD POS NAA+NON-PROBE: DETECTED
SODIUM SERPL-SCNC: 139 MMOL/L (ref 135–147)
WBC # BLD AUTO: 9.37 THOUSAND/UL (ref 4.31–10.16)

## 2023-01-30 RX ORDER — AMOXICILLIN AND CLAVULANATE POTASSIUM 875; 125 MG/1; MG/1
1 TABLET, FILM COATED ORAL EVERY 12 HOURS SCHEDULED
Status: DISCONTINUED | OUTPATIENT
Start: 2023-01-30 | End: 2023-01-30 | Stop reason: HOSPADM

## 2023-01-30 RX ORDER — LIDOCAINE HYDROCHLORIDE 5 MG/ML
10 INJECTION, SOLUTION INFILTRATION; INTRAVENOUS ONCE
Status: DISCONTINUED | OUTPATIENT
Start: 2023-01-30 | End: 2023-01-30 | Stop reason: HOSPADM

## 2023-01-30 RX ORDER — AMOXICILLIN AND CLAVULANATE POTASSIUM 250; 125 MG/1; MG/1
1 TABLET, FILM COATED ORAL EVERY 8 HOURS SCHEDULED
Status: DISCONTINUED | OUTPATIENT
Start: 2023-01-30 | End: 2023-01-30

## 2023-01-30 RX ORDER — IBUPROFEN 600 MG/1
600 TABLET ORAL EVERY 6 HOURS PRN
Qty: 30 TABLET | Refills: 0 | Status: SHIPPED | OUTPATIENT
Start: 2023-01-30 | End: 2023-02-03 | Stop reason: SDUPTHER

## 2023-01-30 RX ORDER — ACETAMINOPHEN 325 MG/1
650 TABLET ORAL EVERY 6 HOURS SCHEDULED
Qty: 60 TABLET | Refills: 0 | Status: SHIPPED | OUTPATIENT
Start: 2023-01-30

## 2023-01-30 RX ORDER — DIAPER,BRIEF,INFANT-TODD,DISP
EACH MISCELLANEOUS 4 TIMES DAILY PRN
Status: DISCONTINUED | OUTPATIENT
Start: 2023-01-30 | End: 2023-01-30 | Stop reason: HOSPADM

## 2023-01-30 RX ORDER — AMOXICILLIN AND CLAVULANATE POTASSIUM 875; 125 MG/1; MG/1
1 TABLET, FILM COATED ORAL EVERY 12 HOURS SCHEDULED
Qty: 8 TABLET | Refills: 0 | Status: SHIPPED | OUTPATIENT
Start: 2023-01-30 | End: 2023-02-03

## 2023-01-30 RX ORDER — FENOPROFEN CALCIUM 200 MG
CAPSULE ORAL 2 TIMES DAILY
Qty: 118 ML | Refills: 0 | Status: SHIPPED | OUTPATIENT
Start: 2023-01-30

## 2023-01-30 RX ADMIN — ACETAMINOPHEN 650 MG: 325 TABLET ORAL at 05:35

## 2023-01-30 RX ADMIN — ENOXAPARIN SODIUM 40 MG: 40 INJECTION SUBCUTANEOUS at 08:26

## 2023-01-30 RX ADMIN — AMOXICILLIN AND CLAVULANATE POTASSIUM 1 TABLET: 875; 125 TABLET, FILM COATED ORAL at 08:26

## 2023-01-30 RX ADMIN — HYDROCORTISONE: 1 CREAM TOPICAL at 12:49

## 2023-01-30 RX ADMIN — SODIUM CHLORIDE 3 G: 9 INJECTION, SOLUTION INTRAVENOUS at 03:06

## 2023-01-30 NOTE — QUICK NOTE
Patient having persistent leakage around JOYCE drain, enough to saturate through multiple gauze and an ABD  Area around drain cleansed with alcohol and then infiltrated with 1% lidocaine  Area then reprepped with chlorhexidine and 2 separate silk sutures placed through the skin and tied to better close off the opening  Silk suture then secured to drain tube  Patient tolerated well        Elvi Jackson MD   OB/Gyn PGY-2  12:03 PM  01/30/23

## 2023-01-30 NOTE — PROGRESS NOTES
For questions/concerns on this patient, please reach out to the followin AM - 5 PM [facility]- Resident  5 PM - 6 AM: [facility]- Senior/Consult Resident  Gyn Oncology Progress note   Jose Singletary 50 y o  female MRN: [id number]  Unit/Bed#: E5 -01 Encounter: [id number]    Assessment/Plan:    Joanna watts 50 y o  POD#3 s/p diagnostic lap and pelvic washout due to ileus and pelvic abscess s/p CKC on   Stable and slowly regaining bowel function  Cellulitis  Assessment & Plan  Area of erythema with warmth on mons and right thigh outlined on , resolved as of     Pelvic fluid collection  Assessment & Plan  · 4 8 x 2 4cm fluid collection posterior to cervix concerning for pelvic abscess versus inflammatory changes postoperatively  · : diagnostic lap, pelvic washout, and drain placement - pelvic abscess, small peritoneal defect  · Blood Cx #1 NGTD, Blood Cx#2 coag neg staph, Tissue Cx: gram + cocci in pairs (strep)  Current abx:  -> per ID, narrowed to Unasyn 3g q6, now transitioned to PO augmentin    Lab Results   Component Value Date/Time    WBC 8 27 2023 04:19 AM    WBC 9 40 2023 08:29 AM    WBC 5 90 2023 04:50 AM     (H) 2023 04:19 AM     (H) 2023 08:29 AM     (H) 2023 04:50 AM     RLQ drain with 10cc serous output last 24 hours, can consider removal  ID consulted, appreciate recs   Repeat CTAP with PO and IV contrast on  with minimal change, ileus with drain tip in pelvis    S/P cone biopsy of cervix  Assessment & Plan  Done for EMILI 2-3, complicated by peritoneal defect discovered post op  Lab Results   Component Value Date/Time    HGB 11 1 (L) 2023 04:51 AM    HGB 10 8 (L) 2023 04:19 AM    HGB 11 4 (L) 2023 08:29 AM       * Postoperative ileus ([address] )  Assessment & Plan  · CT abd/pelvis on admit c/w postoperative ileus with multiple dilated loops of small bowel, s/p NG tube  · Pain: venus tylenol, PRN motrin  · FEN: Cindy diet, replete electrolytes PRN           Subjective:    Lexie uHmphries has no current complaints  Pain is well controlled with oral analgesics  Patient is currently voiding  She is ambulating  Patient is currently passing flatus and has had bowel movement  She is tolerating PO, and denies nausea or vomitting  Patient denies fever, chills, chest pain, shortness of breath, or calf tenderness  Objective:  /84 (BP Location: Right arm)   Pulse 79   Temp 98 4 °F (36 9 °C) (Oral)   Resp 21   Ht 5' 3" (1 6 m)   Wt 52 4 kg (115 lb 8 3 oz)   SpO2 98%   BMI 20 46 kg/m²     I/O last 3 completed shifts:  In: -   Out: 530 [Urine:450; Emesis/NG output:50; Drains:30]  No intake/output data recorded  Lab Results   Component Value Date    WBC 9 37 01/30/2023    HGB 11 1 (L) 01/30/2023    HCT 33 7 (L) 01/30/2023    MCV 86 01/30/2023     (H) 01/30/2023       Lab Results   Component Value Date    GLUCOSE 76 04/06/2015    CALCIUM 7 8 (L) 01/30/2023     02/03/2016    K 3 7 01/30/2023    CO2 26 01/30/2023     01/30/2023    BUN 5 01/30/2023    CREATININE 0 63 01/30/2023           Physical Exam  Vitals reviewed  Constitutional:       General: She is not in acute distress  Appearance: Normal appearance  She is not toxic-appearing  HENT:      Head: Normocephalic and atraumatic  Eyes:      Extraocular Movements: Extraocular movements intact  Cardiovascular:      Rate and Rhythm: Normal rate and regular rhythm  Pulses: Normal pulses  Pulmonary:      Effort: Pulmonary effort is normal  No respiratory distress  Abdominal:      General: There is no distension  Palpations: Abdomen is soft  Tenderness: There is no abdominal tenderness  There is no guarding  Comments: JOYCE drain RLQ, serous  Port site incisions c/d/i   Musculoskeletal:         General: Normal range of motion  Cervical back: Normal range of motion     Skin:     General: Skin is warm and dry  Neurological:      General: No focal deficit present  Mental Status: She is alert  Mental status is at baseline     Psychiatric:         Mood and Affect: Mood normal          Behavior: Behavior normal            Montserrat Bustillos MD  1/30/2023  6:47 AM

## 2023-01-30 NOTE — PLAN OF CARE
Problem: Potential for Falls  Goal: Patient will remain free of falls  Description: INTERVENTIONS:  - Educate patient/family on patient safety including physical limitations  - Instruct patient to call for assistance with activity   - Consult OT/PT to assist with strengthening/mobility   - Keep Call bell within reach  - Keep bed low and locked with side rails adjusted as appropriate  - Keep care items and personal belongings within reach  - Initiate and maintain comfort rounds  - Make Fall Risk Sign visible to staff  - Apply yellow socks and bracelet for high fall risk patients  - Consider moving patient to room near nurses station  Outcome: Adequate for Discharge     Problem: PAIN - ADULT  Goal: Verbalizes/displays adequate comfort level or baseline comfort level  Description: Interventions:  - Encourage patient to monitor pain and request assistance  - Assess pain using appropriate pain scale  - Administer analgesics based on type and severity of pain and evaluate response  - Implement non-pharmacological measures as appropriate and evaluate response  - Consider cultural and social influences on pain and pain management  - Notify physician/advanced practitioner if interventions unsuccessful or patient reports new pain  Outcome: Adequate for Discharge     Problem: DISCHARGE PLANNING  Goal: Discharge to home or other facility with appropriate resources  Description: INTERVENTIONS:  - Identify barriers to discharge w/patient and caregiver  - Arrange for needed discharge resources and transportation as appropriate  - Identify discharge learning needs (meds, wound care, etc )  - Arrange for interpretive services to assist at discharge as needed  - Refer to Case Management Department for coordinating discharge planning if the patient needs post-hospital services based on physician/advanced practitioner order or complex needs related to functional status, cognitive ability, or social support system  Outcome: Adequate for Discharge     Problem: GASTROINTESTINAL - ADULT  Goal: Minimal or absence of nausea and/or vomiting  Description: INTERVENTIONS:  - Administer IV fluids if ordered to ensure adequate hydration  - Maintain NPO status until nausea and vomiting are resolved  - Nasogastric tube if ordered  - Administer ordered antiemetic medications as needed  - Provide nonpharmacologic comfort measures as appropriate  - Advance diet as tolerated, if ordered  - Consider nutrition services referral to assist patient with adequate nutrition and appropriate food choices  Outcome: Adequate for Discharge  Goal: Maintains adequate nutritional intake  Description: INTERVENTIONS:  - Monitor percentage of each meal consumed  - Identify factors contributing to decreased intake, treat as appropriate  - Assist with meals as needed  - Monitor I&O, weight, and lab values if indicated  - Obtain nutrition services referral as needed  Outcome: Adequate for Discharge     Problem: METABOLIC, FLUID AND ELECTROLYTES - ADULT  Goal: Fluid balance maintained  Description: INTERVENTIONS:  - Monitor labs   - Monitor I/O and WT  - Instruct patient on fluid and nutrition as appropriate  - Assess for signs & symptoms of volume excess or deficit  Outcome: Adequate for Discharge     Problem: MOBILITY - ADULT  Goal: Maintain or return to baseline ADL function  Description: INTERVENTIONS:  -  Assess patient's ability to carry out ADLs; assess patient's baseline for ADL function and identify physical deficits which impact ability to perform ADLs (bathing, care of mouth/teeth, toileting, grooming, dressing, etc )  - Assess/evaluate cause of self-care deficits   - Assess range of motion  - Assess patient's mobility; develop plan if impaired  - Assess patient's need for assistive devices and provide as appropriate  - Encourage maximum independence but intervene and supervise when necessary  - Involve family in performance of ADLs  - Assess for home care needs following discharge   - Consider OT consult to assist with ADL evaluation and planning for discharge  - Provide patient education as appropriate  Outcome: Adequate for Discharge  Goal: Maintains/Returns to pre admission functional level  Description: INTERVENTIONS:  - Perform BMAT or MOVE assessment daily    - Set and communicate daily mobility goal to care team and patient/family/caregiver  - Collaborate with rehabilitation services on mobility goals if consulted  - Out of bed for toileting  - Record patient progress and toleration of activity level   Outcome: Adequate for Discharge     Problem: Nutrition/Hydration-ADULT  Goal: Nutrient/Hydration intake appropriate for improving, restoring or maintaining nutritional needs  Description: Monitor and assess patient's nutrition/hydration status for malnutrition  Collaborate with interdisciplinary team and initiate plan and interventions as ordered  Monitor patient's weight and dietary intake as ordered or per policy  Utilize nutrition screening tool and intervene as necessary  Determine patient's food preferences and provide high-protein, high-caloric foods as appropriate       INTERVENTIONS:  - Monitor oral intake, urinary output, labs, and treatment plans  - Assess nutrition and hydration status and recommend course of action  - Evaluate amount of meals eaten  - Assist patient with eating if necessary   - Allow adequate time for meals  - Recommend/ encourage appropriate diets, oral nutritional supplements, and vitamin/mineral supplements  - Order, calculate, and assess calorie counts as needed  - Recommend, monitor, and adjust tube feedings and TPN/PPN based on assessed needs  - Assess need for intravenous fluids  - Provide specific nutrition/hydration education as appropriate  - Include patient/family/caregiver in decisions related to nutrition  Outcome: Adequate for Discharge

## 2023-01-30 NOTE — PROGRESS NOTES
Received Paperwork   What type of form  Disability   Received by fax or patient drop off  Patient's sister gave to Dr Javan Starkey in basket to Gyn Oncology Clinical pool to have paperwork completed

## 2023-01-30 NOTE — PROGRESS NOTES
Progress Note - Infectious Disease   Shashi Kaur 50 y o  female MRN: [id number]  Unit/Bed#: E5 -01 Encounter: [id number]      Impression/Plan:  1  Pelvic Abscess  Secondary to recent cone biopsy procedure on 1/20  S/p OR washout on 1/25 with evidence of small defect in posterior cul-de-sac and contiguous abscess formation involving this area and left pelvic sidewall and left ovarian fossa  Drain was placed  Cultures reviewed patient transition to oral antibiotic by primary service  Noted to have follicular rash on the back which I suspect is unrelated to antibiotic  Repeat CT without residual abscess      -Continue Augmentin 875 every 12 hours, dosing adjusted  -Plan for total of 10 days of therapy through 2/3  -Monitor CBC/chemistry while admitted  -Trend fever curve/vitals while admitted  -Drain management as per primary  -Additional supportive care/discharge as per primary     2  SIRS, Present on Admission  Leukocytosis, tachycardia, likely due to #1  She is afebrile currently with normal WBC     -antibiotics as above  -trend fever curve  -trend WBC  -Additional cares per primary     3  Coag Negative Staph in Blood cx: Grew in 1/2 sets from 1/24  Likely skin contaminant  No additional antibiotic for this issue  4  High density material within gallbladder  Seen on CT 1/24, with small pericholecystic fluid  New compared to 1/22 CT  Ultrasound without any acute signs of cholecystitis  LFTs otherwise unremarkable  Continue antibiotics as above       5  Splenomegaly  Noted on CT  Recommend additional work-up and follow-up as outpatient by primary      6  Right Sided Stage 1B Breast Cancer s/p lumpectomy and reconstruction  Not actively on chemotherapy and radiation  Additional care as per GYN      7  Cephalexin allergy  Reported as rash on chest, no swelling or breathing difficulties  She says she has tolerated Amoxicillin before  Noted to have what appears to be folliculitis on the back    No other acute signs of reaction to Augmentin     -Continue antibiotic as above  -Monitor for tolerance while admitted  -Continue hydrocortisone for follicular rash     Above plan discussed in detail with the patient, primary service, patient's  and nursing  ID consult service will sign off at this time  Please call if questions  Antibiotics:  Augmentin 1  Total antibiotics 7    24 Hour events:  Yesterday and overnight notes reviewed and no acute events noted  Unfortunately today patient noted to have some vaginal bleeding  Subjective:  Seen at bedside earlier today and she denied having any nausea, vomiting, chest pain or shortness of breath,  Tolerating current antibiotic without issue  Nursing pointed out rash that was developing on the back and her  and her report that this has been going on for a few days but is actually getting better  Has not spread or involve any other parts of the body  Currently using hydrocortisone on the area  Patient suspicious that this may be related to sweating/skin irritation to the back while in the hospital     Objective:  Vitals:  Temp:  [97 5 °F (36 4 °C)-98 5 °F (36 9 °C)] 98 4 °F (36 9 °C)  HR:  [74-79] 79  Resp:  [18-21] 21  BP: (130-136)/(84) 136/84  SpO2:  [97 %-98 %] 98 %  Temp (24hrs), Av 1 °F (36 7 °C), Min:97 5 °F (36 4 °C), Max:98 5 °F (36 9 °C)  Current: Temperature: 98 4 °F (36 9 °C)    Physical Exam:   General Appearance:  Alert, interactive, nontoxic, no acute distress  Throat: Oropharynx moist without lesions  Lungs:   Clear to auscultation bilaterally; no wheezes, rhonchi or rales; respirations unlabored on room air   Heart:  RRR; no murmur, rub or gallop   Abdomen:   Soft, non-tender, non-distended, positive bowel sounds  JOYCE drain with serosanguineous fluid  No drainage appreciated around the insertion site after stitch placed  Extremities: No clubbing, cyanosis or edema   Skin: No new rashes or lesions   No new draining wounds noted  Lesions evaluated on the patient's back and they appear to be more follicular in nature  None of the lesions appear consistent with hives  Labs, Imaging, & Other studies:   All pertinent labs and imaging studies were personally reviewed as below  Results from last 7 days   Lab Units 01/30/23  0451 01/29/23  0419 01/28/23  0829   WBC Thousand/uL 9 37 8 27 9 40   HEMOGLOBIN g/dL 11 1* 10 8* 11 4*   PLATELETS Thousands/uL 549* 591* 607*     Results from last 7 days   Lab Units 01/30/23  0451 01/29/23  0729 01/28/23  0829 01/27/23  0450 01/26/23  0551   POTASSIUM mmol/L 3 7   < > 3 6  3 6 3 6 4 0   CHLORIDE mmol/L 106   < > 107  107 114* 108   CO2 mmol/L 26   < > 23  25 23 22   BUN mg/dL 5   < > 4*  4* 6 5   CREATININE mg/dL 0 63   < > 0 61  0 61 0 64 0 63   EGFR ml/min/1 73sq m 104   < > 106  106 104 104   CALCIUM mg/dL 7 8*   < > 8 3*  8 1* 7 7* 8 1*   AST U/L  --   --  11* 7* 7*   ALT U/L  --   --  5* 4* 5*   ALK PHOS U/L  --   --  41 33* 39    < > = values in this interval not displayed  Results from last 7 days   Lab Units 01/25/23  1642 01/24/23  1754 01/24/23  1744   BLOOD CULTURE   --  No Growth After 5 Days  Staphylococcus coagulase negative*   GRAM STAIN RESULT  1+ Polys*  2+ Gram positive cocci in pairs*  Rare Gram positive rods*  --  Gram positive cocci in clusters*       Lab interpretation/comments: White count unremarkable and hemoglobin stable  Platelets downtrending  Imaging interpretation/comments: Right upper quadrant ultrasound with gallbladder sludge but no findings concerning for cholecystitis  Repeat CT imaging from 1/28 reviewed and there is no residual abscess seen on the images  Culture data: Fluid cultures reviewed

## 2023-01-31 ENCOUNTER — TRANSITIONAL CARE MANAGEMENT (OUTPATIENT)
Dept: FAMILY MEDICINE CLINIC | Facility: CLINIC | Age: 53
End: 2023-01-31

## 2023-01-31 LAB
MYCOBACTERIUM SPEC CULT: NORMAL
MYCOBACTERIUM SPEC CULT: NORMAL
RHODAMINE-AURAMINE STN SPEC: NORMAL
RHODAMINE-AURAMINE STN SPEC: NORMAL

## 2023-02-01 ENCOUNTER — TELEPHONE (OUTPATIENT)
Dept: GYNECOLOGIC ONCOLOGY | Facility: CLINIC | Age: 53
End: 2023-02-01

## 2023-02-01 ENCOUNTER — OFFICE VISIT (OUTPATIENT)
Dept: FAMILY MEDICINE CLINIC | Facility: CLINIC | Age: 53
End: 2023-02-01

## 2023-02-01 VITALS
HEART RATE: 78 BPM | TEMPERATURE: 98.4 F | HEIGHT: 63 IN | OXYGEN SATURATION: 99 % | BODY MASS INDEX: 20.73 KG/M2 | RESPIRATION RATE: 16 BRPM | DIASTOLIC BLOOD PRESSURE: 84 MMHG | WEIGHT: 117 LBS | SYSTOLIC BLOOD PRESSURE: 118 MMHG

## 2023-02-01 DIAGNOSIS — K91.89 POSTOPERATIVE ILEUS (HCC): ICD-10-CM

## 2023-02-01 DIAGNOSIS — K56.7 POSTOPERATIVE ILEUS (HCC): ICD-10-CM

## 2023-02-01 DIAGNOSIS — R16.1 SPLENOMEGALY: ICD-10-CM

## 2023-02-01 DIAGNOSIS — D47.3 ESSENTIAL THROMBOCYTOSIS (HCC): ICD-10-CM

## 2023-02-01 DIAGNOSIS — C50.819 MALIGNANT NEOPLASM OF OVERLAPPING SITES OF BREAST IN FEMALE, ESTROGEN RECEPTOR POSITIVE, UNSPECIFIED LATERALITY (HCC): ICD-10-CM

## 2023-02-01 DIAGNOSIS — Z17.0 MALIGNANT NEOPLASM OF OVERLAPPING SITES OF BREAST IN FEMALE, ESTROGEN RECEPTOR POSITIVE, UNSPECIFIED LATERALITY (HCC): ICD-10-CM

## 2023-02-01 DIAGNOSIS — R18.8 PELVIC FLUID COLLECTION: Primary | ICD-10-CM

## 2023-02-01 PROBLEM — D70.8 OTHER NEUTROPENIA (HCC): Status: RESOLVED | Noted: 2022-02-03 | Resolved: 2023-02-01

## 2023-02-01 NOTE — ASSESSMENT & PLAN NOTE
Recent CBC shows improvement    Patient will be following up with hematology to discuss splenomegaly

## 2023-02-01 NOTE — TELEPHONE ENCOUNTER
Patient called into the office with an update on her disability ppwk  Patient was informed that ppwk would be completed by the earliest of Friday   Patient has upcoming appointment with Dr Amilcar Devi on 2 3

## 2023-02-01 NOTE — PROGRESS NOTES
Assessment/Plan:    Essential thrombocytosis (HCC)  Recent CBC shows improvement  Patient will be following up with hematology to discuss splenomegaly    Breast cancer [facility])  IVONE         Problem List Items Addressed This Visit        Digestive    Postoperative ileus [facility])       Hematopoietic and Hemostatic    Essential thrombocytosis ([address] )     Recent CBC shows improvement  Patient will be following up with hematology to discuss splenomegaly            Other    Pelvic fluid collection - Primary    Relevant Orders    CBC and differential    Comprehensive metabolic panel    Breast cancer (HCC)     IVONE        Other Visit Diagnoses     Splenomegaly            Abdominal distention likely postsurgical swelling  Encouraged cool compresses  I would reach out to GYN for further guidance    Subjective:      Patient ID: Aníbal Price is a 50 y o  female  HPI  TCM Call     Date and time call was made  1/31/2023  3:19 PM    Hospital care reviewed  Records reviewed    Patient was hospitialized at  [address]    Date of Admission  01/24/23    Date of discharge  01/30/23    Diagnosis  Postoperative ileus    Disposition  Home    Were the patients medications reviewed and updated  Yes    Current Symptoms  None      TCM Call     Post hospital issues  None    Should patient be enrolled in anticoag monitoring? No    Scheduled for follow up?   Yes    Did you obtain your prescribed medications  Yes    Do you need help managing your prescriptions or medications  No    Is transportation to your appointment needed  No    I have advised the patient to call PCP with any new or worsening symptoms  Slime Ingram MA    Living Arrangements  Spouse or Significiant other; Family members    Support System  Spouse    The type of support provided  Emotional; Physical; Financial    Do you have social support  Yes, as much as I need    Are you recieving any outpatient services  No    Are you recieving home care services  No    Are you using any community resources  No    Current waiver services  No    Have you fallen in the last 12 months  No    Interperter language line needed  No    Counseling  Patient      Patient is here to follow-up on recent hospitalization with abdominal pain with a suspicion of ileus and was found to have fluid collection posterior to the cervix  Patient was taken to the OR underwent diagnostic laparoscopy and a pelvic washout  Culture showed anaerobic growth of Prevotella and veillonella  Patient had JOYCE placement  Follow-up CT did not show any further evidence of abscess  Patient was given IV antibiotic transitioned over to p o  Augmentin now  Diet was advanced after NG was removed  Patient has been tolerating it well and able to complete her ADLs  Patient will be following up with GYN for JOYCE removal     Blood work showed improvement of her platelet count now  Her hemoglobin noted to be 10 8  Patient complains of abdominal distention  Denies any fever and chills has been tolerating p o  intake and has been passing gas  Has not used narcotics since discharge from the hospital   Leighann Bettie but since discharge yesterday has been 20 mL  The following portions of the patient's history were reviewed and updated as appropriate: allergies, current medications, past medical history, past social history, past surgical history and problem list     Review of Systems      Objective:      /84 (BP Location: Left arm, Patient Position: Sitting, Cuff Size: Standard)   Pulse 78   Temp 98 4 °F (36 9 °C) (Tympanic)   Resp 16   Ht 5' 3" (1 6 m)   Wt 53 1 kg (117 lb)   SpO2 99%   BMI 20 73 kg/m²          Physical Exam  Cardiovascular:      Rate and Rhythm: Normal rate and regular rhythm  Pulmonary:      Effort: Pulmonary effort is normal  No respiratory distress  Abdominal:      General: Bowel sounds are normal  There is distension (Mildly distended)  Tenderness:  There is no abdominal tenderness (Minimal tenderness)  Comments: JOYCE has clear fluid about 10 ml   Musculoskeletal:         General: No tenderness (Calf tenderness negative)  Right lower leg: No edema  Left lower leg: No edema  Neurological:      Mental Status: She is alert

## 2023-02-03 ENCOUNTER — OFFICE VISIT (OUTPATIENT)
Dept: GYNECOLOGIC ONCOLOGY | Facility: CLINIC | Age: 53
End: 2023-02-03

## 2023-02-03 ENCOUNTER — TELEPHONE (OUTPATIENT)
Dept: GYNECOLOGIC ONCOLOGY | Facility: CLINIC | Age: 53
End: 2023-02-03

## 2023-02-03 VITALS
SYSTOLIC BLOOD PRESSURE: 128 MMHG | HEART RATE: 82 BPM | RESPIRATION RATE: 18 BRPM | BODY MASS INDEX: 20.91 KG/M2 | TEMPERATURE: 98.4 F | WEIGHT: 118 LBS | OXYGEN SATURATION: 98 % | HEIGHT: 63 IN | DIASTOLIC BLOOD PRESSURE: 82 MMHG

## 2023-02-03 DIAGNOSIS — M79.18 MUSCULOSKELETAL PAIN: ICD-10-CM

## 2023-02-03 DIAGNOSIS — Z98.890 S/P CONE BIOPSY OF CERVIX: Primary | ICD-10-CM

## 2023-02-03 DIAGNOSIS — N73.9 PELVIC ABSCESS IN FEMALE: ICD-10-CM

## 2023-02-03 DIAGNOSIS — R16.1 SPLENOMEGALY: ICD-10-CM

## 2023-02-03 DIAGNOSIS — R74.8 ELEVATED LIVER ENZYMES: Primary | ICD-10-CM

## 2023-02-03 LAB
BACTERIA SPEC ANAEROBE CULT: ABNORMAL
BACTERIA SPEC ANAEROBE CULT: ABNORMAL

## 2023-02-03 RX ORDER — IBUPROFEN 600 MG/1
600 TABLET ORAL EVERY 6 HOURS PRN
Qty: 30 TABLET | Refills: 0 | Status: SHIPPED | OUTPATIENT
Start: 2023-02-03

## 2023-02-03 RX ORDER — LIDOCAINE 50 MG/G
1 PATCH TOPICAL DAILY
Qty: 6 PATCH | Refills: 0 | Status: SHIPPED | OUTPATIENT
Start: 2023-02-03

## 2023-02-03 NOTE — TELEPHONE ENCOUNTER
Call placed to patient to inform her of her second post op   Patient will be seen by Dr Gail Arciniega on 2 17 in [facility] @[id number]

## 2023-02-03 NOTE — PROGRESS NOTES
Assessment/Plan:    Problem List Items Addressed This Visit        Other    S/P cone biopsy of cervix - Primary     54yo with h/o stage IB breast cancer now s/p CKC for CIN3 presents for post op  JOYCE with minimal serous outpt  Pulled without difficulty  2 monocryl sutures placed given persistent ooze  I discussed with patient the course of her surgery and subsequent complications as a result of entry into the posterior peritoneum - which was recognized at the time of surgery and repaired  She unfortunately developed inflammation at this point of entry leading to ileus and infection  Recovered from ileus standpoint  Reiterated precautions and expectations after laparoscopic procedure as her bloating/discomfort is to be expected at this point  RTC 2 weeks for re-check  Splenomegaly     Noted on recent imaging  F/u with hematology         Other Visit Diagnoses     Musculoskeletal pain        Relevant Medications    lidocaine (Lidoderm) 5 %    Pelvic abscess in female        Relevant Medications    ibuprofen (MOTRIN) 600 mg tablet            CHIEF COMPLAINT: post op        Problem:   Cancer Staging   Personal history of adenocarcinoma of breast  Staging form: Breast, AJCC 7th Edition  - Pathologic: Stage IA (T1b, N0, cM0) - Signed by Annabel Prince MD on 5/21/2018        Previous therapy:  Oncology History Overview Note   9/2018 - observation with 81 mg ASA daily     Personal history of adenocarcinoma of breast   4/2009 Initial Diagnosis    Invasive ductal carcinoma of breast, female, right ([facility] [address] )     4/2009 - 5/2009 Cancer Staged     T1 B  N0, ER/IL positive, HER-2 negative, infiltrating ductal carcinoma  Stage IA, right     5/11/2009 Surgery    Right partial mastectomy, SLNB, breast implant  Dr Adan Humphries     5/2009 Genetic Testing    BRCA negative     2009 - 2009 Radiation    WBRT    Dr Mariana Nair     6/2009 - 6/2014 Hormone Therapy    Tamoxifen     Essential thrombocytosis ([facility] [address] )   6/13/2018 Initial Diagnosis    In the past, Plt count had increased and return to normal   Pt was screened for ET with Jorge mutation and was negative  Then in June 2018, platelet count elevated again and mutation testing was completed  8/23/2018 Genomic Testing    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  BCR-aBL was negative via FISH  Patient ID: Severo Bile is a 50 y o  female  54yo with h/o stage IB breast cancer now s/p CKC for CIN3 presents for post op  Post op was complicated by pelvic infection/ileus requiring dx lap/wash out  Pt reports had a better day yesterday but feels that today she is worse again with MSK pain along the right flank  She is overwhelmed with  The events post procedure and had to cancel a trip to [facility] as a result  She reports bloating but normal appetite and bowel movements  JOYCE with minimal <10cc serous fluid  2/2017: ASCUS/-HPV  11/2017: colpo CIN1  2/2018: NIL  6/2019: AGC/+HPV  6/2020: ASCUS, EMB/ECC neg  12/2020 NIL/-HPV  6/2021 NIL/-HPV  7/2022: NIL/+HPV  9/2022: ECC CIN2-3  1/2023: CKC      The following portions of the patient's history were reviewed and updated as appropriate: allergies, current medications, past family history, past medical history, past social history, past surgical history and problem list     Review of Systems   Constitutional: Negative for appetite change, chills, fatigue and fever  Respiratory: Negative for chest tightness and shortness of breath  Gastrointestinal: Positive for abdominal distention and abdominal pain  Negative for constipation, diarrhea and nausea  Genitourinary: Negative for difficulty urinating, flank pain, frequency, urgency, vaginal bleeding, vaginal discharge and vaginal pain  Musculoskeletal: Negative for back pain, joint swelling and myalgias  Skin: Negative for rash  Neurological: Negative for dizziness, light-headedness, numbness and headaches     Psychiatric/Behavioral: The patient is nervous/anxious  Current Outpatient Medications:   •  acetaminophen (TYLENOL) 325 mg tablet, Take 2 tablets (650 mg total) by mouth every 6 (six) hours, Disp: 60 tablet, Rfl: 0  •  amoxicillin-clavulanate (AUGMENTIN) 875-125 mg per tablet, Take 1 tablet by mouth every 12 (twelve) hours for 4 days, Disp: 8 tablet, Rfl: 0  •  hydrocortisone 1 % lotion, Apply topically 2 (two) times a day, Disp: 118 mL, Rfl: 0  •  ibuprofen (MOTRIN) 600 mg tablet, Take 1 tablet (600 mg total) by mouth every 6 (six) hours as needed for moderate pain, Disp: 30 tablet, Rfl: 0  •  intrauterine copper (PARAGARD) IUD, 1 each by Intrauterine route once, Disp: , Rfl:   •  lidocaine (Lidoderm) 5 %, Apply 1 patch topically over 12 hours daily Remove & Discard patch within 12 hours or as directed by MD, Disp: 6 patch, Rfl: 0  •  LORazepam (ATIVAN) 0 5 mg tablet, Take 1 tablet (0 5 mg total) by mouth daily as needed for anxiety, Disp: 20 tablet, Rfl: 0  •  aspirin (ECOTRIN LOW STRENGTH) 81 mg EC tablet, Take 81 mg by mouth daily (Patient not taking: Reported on 1/22/2023), Disp: , Rfl:   •  bisacodyl (DULCOLAX) 10 mg suppository, Insert 1 suppository (10 mg total) into the rectum daily (Patient not taking: Reported on 2/1/2023), Disp: 12 suppository, Rfl: 0    Objective:    Blood pressure 128/82, pulse 82, temperature 98 4 °F (36 9 °C), temperature source Temporal, resp  rate 18, height 5' 3" (1 6 m), weight 53 5 kg (118 lb), SpO2 98 %, not currently breastfeeding  Body mass index is 20 9 kg/m²  Body surface area is 1 55 meters squared  Physical Exam  HENT:      Head: Normocephalic and atraumatic  Cardiovascular:      Rate and Rhythm: Normal rate and regular rhythm  Pulmonary:      Effort: Pulmonary effort is normal    Abdominal:      General: There is no distension  Palpations: Abdomen is soft  There is no mass  Comments: Incisions c/d/i    JOYCE with serous fluid removed   Genitourinary:     Comments:  The external female genitalia is normal  The bartholin's, uretheral and skenes glands are normal  The urethral meatus is normal (midline with no lesions)  Anus without fissure or lesion  Speculum exam reveals a grossly normal vagina cuff that is healing well  No masses, lesions,discharge or bleeding  Bimanual exam notes a surgical absent cervix, uterus and adnexal structures  No masses or fullness  Bladder is without fullness, mass or tenderness  Musculoskeletal:         General: Normal range of motion  Cervical back: Normal range of motion  Skin:     General: Skin is warm and dry  Neurological:      Mental Status: She is alert and oriented to person, place, and time

## 2023-02-03 NOTE — ASSESSMENT & PLAN NOTE
54yo with h/o stage IB breast cancer now s/p CKC for CIN3 presents for post op  JOYCE with minimal serous outpt  Pulled without difficulty  2 monocryl sutures placed given persistent ooze  I discussed with patient the course of her surgery and subsequent complications as a result of entry into the posterior peritoneum - which was recognized at the time of surgery and repaired  She unfortunately developed inflammation at this point of entry leading to ileus and infection  Recovered from ileus standpoint  Reiterated precautions and expectations after laparoscopic procedure as her bloating/discomfort is to be expected at this point  RTC 2 weeks for re-check

## 2023-02-06 ENCOUNTER — PATIENT OUTREACH (OUTPATIENT)
Dept: CASE MANAGEMENT | Facility: OTHER | Age: 53
End: 2023-02-06

## 2023-02-06 ENCOUNTER — APPOINTMENT (OUTPATIENT)
Dept: LAB | Facility: MEDICAL CENTER | Age: 53
End: 2023-02-06

## 2023-02-06 LAB
ALBUMIN SERPL BCP-MCNC: 3.3 G/DL (ref 3.5–5)
ALP SERPL-CCNC: 89 U/L (ref 46–116)
ALT SERPL W P-5'-P-CCNC: 54 U/L (ref 12–78)
ANION GAP SERPL CALCULATED.3IONS-SCNC: 7 MMOL/L (ref 4–13)
AST SERPL W P-5'-P-CCNC: 27 U/L (ref 5–45)
BASOPHILS # BLD AUTO: 0.13 THOUSANDS/ÂΜL (ref 0–0.1)
BASOPHILS NFR BLD AUTO: 1 % (ref 0–1)
BILIRUB SERPL-MCNC: 0.35 MG/DL (ref 0.2–1)
BUN SERPL-MCNC: 16 MG/DL (ref 5–25)
CALCIUM ALBUM COR SERPL-MCNC: 9.8 MG/DL (ref 8.3–10.1)
CALCIUM SERPL-MCNC: 9.2 MG/DL (ref 8.3–10.1)
CHLORIDE SERPL-SCNC: 107 MMOL/L (ref 96–108)
CO2 SERPL-SCNC: 23 MMOL/L (ref 21–32)
CREAT SERPL-MCNC: 0.74 MG/DL (ref 0.6–1.3)
EOSINOPHIL # BLD AUTO: 0.21 THOUSAND/ÂΜL (ref 0–0.61)
EOSINOPHIL NFR BLD AUTO: 2 % (ref 0–6)
ERYTHROCYTE [DISTWIDTH] IN BLOOD BY AUTOMATED COUNT: 14.6 % (ref 11.6–15.1)
GFR SERPL CREATININE-BSD FRML MDRD: 94 ML/MIN/1.73SQ M
GLUCOSE SERPL-MCNC: 58 MG/DL (ref 65–140)
HCT VFR BLD AUTO: 38.3 % (ref 34.8–46.1)
HGB BLD-MCNC: 12.3 G/DL (ref 11.5–15.4)
IMM GRANULOCYTES # BLD AUTO: 0.16 THOUSAND/UL (ref 0–0.2)
IMM GRANULOCYTES NFR BLD AUTO: 2 % (ref 0–2)
LYMPHOCYTES # BLD AUTO: 1.9 THOUSANDS/ÂΜL (ref 0.6–4.47)
LYMPHOCYTES NFR BLD AUTO: 20 % (ref 14–44)
MCH RBC QN AUTO: 28.3 PG (ref 26.8–34.3)
MCHC RBC AUTO-ENTMCNC: 32.1 G/DL (ref 31.4–37.4)
MCV RBC AUTO: 88 FL (ref 82–98)
MONOCYTES # BLD AUTO: 0.58 THOUSAND/ÂΜL (ref 0.17–1.22)
MONOCYTES NFR BLD AUTO: 6 % (ref 4–12)
NEUTROPHILS # BLD AUTO: 6.33 THOUSANDS/ÂΜL (ref 1.85–7.62)
NEUTS SEG NFR BLD AUTO: 69 % (ref 43–75)
NRBC BLD AUTO-RTO: 0 /100 WBCS
PLATELET # BLD AUTO: 734 THOUSANDS/UL (ref 149–390)
PMV BLD AUTO: 9.9 FL (ref 8.9–12.7)
POTASSIUM SERPL-SCNC: 4 MMOL/L (ref 3.5–5.3)
PROT SERPL-MCNC: 6.9 G/DL (ref 6.4–8.4)
RBC # BLD AUTO: 4.34 MILLION/UL (ref 3.81–5.12)
SODIUM SERPL-SCNC: 137 MMOL/L (ref 135–147)
WBC # BLD AUTO: 9.31 THOUSAND/UL (ref 4.31–10.16)

## 2023-02-06 PROCEDURE — 80053 COMPREHEN METABOLIC PANEL: CPT | Performed by: INTERNAL MEDICINE

## 2023-02-06 PROCEDURE — 36415 COLL VENOUS BLD VENIPUNCTURE: CPT | Performed by: INTERNAL MEDICINE

## 2023-02-06 PROCEDURE — 85025 COMPLETE CBC W/AUTO DIFF WBC: CPT | Performed by: INTERNAL MEDICINE

## 2023-02-17 ENCOUNTER — OFFICE VISIT (OUTPATIENT)
Dept: GYNECOLOGIC ONCOLOGY | Facility: CLINIC | Age: 53
End: 2023-02-17

## 2023-02-17 VITALS
HEIGHT: 63 IN | DIASTOLIC BLOOD PRESSURE: 68 MMHG | RESPIRATION RATE: 18 BRPM | SYSTOLIC BLOOD PRESSURE: 110 MMHG | OXYGEN SATURATION: 100 % | TEMPERATURE: 98 F | HEART RATE: 85 BPM | BODY MASS INDEX: 19.67 KG/M2 | WEIGHT: 111 LBS

## 2023-02-17 DIAGNOSIS — Z98.890 S/P CONE BIOPSY OF CERVIX: ICD-10-CM

## 2023-02-17 DIAGNOSIS — N73.9 PELVIC ABSCESS IN FEMALE: Primary | ICD-10-CM

## 2023-02-17 NOTE — PROGRESS NOTES
Assessment/Plan:    Problem List Items Addressed This Visit        Other    S/P cone biopsy of cervix     54yo with h/o stage IB breast cancer now s/p CKC for CIN3 presents for post op  Patient has what appears to be normal postoperative discomforts however given complicated postoperative course we will obtain CT to assess and ensure no further abnormalities or causes of her discomforts  We again went into detail about the events of her surgery as well as the likely cause of her pelvic abscess and ileus  Patient is understandably having some difficulty coping with the situation but is slowly improving  Reinforced normal postoperative expectations  Will return prior to possible return to work to reassess condition    I will call her with CT result         Other Visit Diagnoses     Pelvic abscess in female    -  Primary    Relevant Orders    CT abdomen pelvis w contrast        I have spent a total time of 30 minutes on 02/17/23 in caring for this patient including Impressions, Counseling / Coordination of care and Reviewing / ordering tests, medicine, procedures    CHIEF COMPLAINT: post op        Problem:   Cancer Staging   Personal history of adenocarcinoma of breast  Staging form: Breast, AJCC 7th Edition  - Pathologic: Stage IA (T1b, N0, cM0) - Signed by Sunni Marquez MD on 5/21/2018        Previous therapy:  Oncology History Overview Note   9/2018 - observation with 81 mg ASA daily     Personal history of adenocarcinoma of breast   4/2009 Initial Diagnosis    Invasive ductal carcinoma of breast, female, right ([facility] [address] )     4/2009 - 5/2009 Cancer Staged     T1 B  N0, ER/PA positive, HER-2 negative, infiltrating ductal carcinoma  Stage IA, right     5/11/2009 Surgery    Right partial mastectomy, SLNB, breast implant  Dr Lola Luong     5/2009 Genetic Testing    BRCA negative     2009 - 2009 Radiation    WBRT    Dr Olamide Schaeffer     6/2009 - 6/2014 Hormone Therapy    Tamoxifen     Essential thrombocytosis ([facility] [address] ) 6/13/2018 Initial Diagnosis    In the past, Plt count had increased and return to normal   Pt was screened for ET with Jorge mutation and was negative  Then in June 2018, platelet count elevated again and mutation testing was completed  8/23/2018 Genomic Testing    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  BCR-aBL was negative via FISH  Patient ID: Humberto Nesbitt is a 48 y o  female  54yo with h/o stage IB breast cancer now s/p CKC for CIN3 presents for post op  Post op was complicated by pelvic infection/ileus requiring dx lap/wash out  Patient continues to have abdominal discomfort that comes and goes  She notes waking up in the middle of the night with worsening discomfort as well as hot flashes  She still has a small little vaginal discharge but no bleeding  She is passing gas and having bowel movements  She still feels that she has some PTSD from all the events postoperatively but is attempting to cope  2/2017: ASCUS/-HPV  11/2017: colpo CIN1  2/2018: NIL  6/2019: AGC/+HPV  6/2020: ASCUS, EMB/ECC neg  12/2020 NIL/-HPV  6/2021 NIL/-HPV  7/2022: NIL/+HPV  9/2022: ECC CIN2-3  1/2023: CKC      The following portions of the patient's history were reviewed and updated as appropriate: allergies, current medications, past family history, past medical history, past social history, past surgical history and problem list     Review of Systems   Constitutional: Positive for fatigue  Negative for appetite change, chills and fever  Respiratory: Negative for chest tightness and shortness of breath  Gastrointestinal: Positive for abdominal distention and abdominal pain  Negative for constipation, diarrhea and nausea  Genitourinary: Positive for vaginal discharge  Negative for difficulty urinating, flank pain, frequency, urgency, vaginal bleeding and vaginal pain  Musculoskeletal: Negative for back pain, joint swelling and myalgias  Skin: Negative for rash     Neurological: Negative for dizziness, light-headedness, numbness and headaches  Psychiatric/Behavioral: Positive for sleep disturbance  The patient is nervous/anxious  Current Outpatient Medications:   •  ibuprofen (MOTRIN) 600 mg tablet, Take 1 tablet (600 mg total) by mouth every 6 (six) hours as needed for moderate pain, Disp: 30 tablet, Rfl: 0  •  intrauterine copper (PARAGARD) IUD, 1 each by Intrauterine route once, Disp: , Rfl:   •  lidocaine (Lidoderm) 5 %, Apply 1 patch topically over 12 hours daily Remove & Discard patch within 12 hours or as directed by MD, Disp: 6 patch, Rfl: 0  •  LORazepam (ATIVAN) 0 5 mg tablet, Take 1 tablet (0 5 mg total) by mouth daily as needed for anxiety, Disp: 20 tablet, Rfl: 0  •  acetaminophen (TYLENOL) 325 mg tablet, Take 2 tablets (650 mg total) by mouth every 6 (six) hours, Disp: 60 tablet, Rfl: 0  •  aspirin (ECOTRIN LOW STRENGTH) 81 mg EC tablet, Take 81 mg by mouth daily (Patient not taking: Reported on 1/22/2023), Disp: , Rfl:   •  bisacodyl (DULCOLAX) 10 mg suppository, Insert 1 suppository (10 mg total) into the rectum daily (Patient not taking: Reported on 2/1/2023), Disp: 12 suppository, Rfl: 0  •  hydrocortisone 1 % lotion, Apply topically 2 (two) times a day, Disp: 118 mL, Rfl: 0    Objective:    Blood pressure 110/68, pulse 85, temperature 98 °F (36 7 °C), temperature source Temporal, resp  rate 18, height 5' 3" (1 6 m), weight 50 3 kg (111 lb), SpO2 100 %, not currently breastfeeding  Body mass index is 19 66 kg/m²  Body surface area is 1 51 meters squared  Physical Exam  HENT:      Head: Normocephalic and atraumatic  Nose: Nose normal    Cardiovascular:      Rate and Rhythm: Normal rate and regular rhythm  Pulmonary:      Effort: Pulmonary effort is normal    Abdominal:      General: There is no distension  Palpations: Abdomen is soft  There is no mass        Comments: Incisions c/d/i   Genitourinary:     Comments: defer  Musculoskeletal:         General: No swelling  Normal range of motion  Cervical back: Normal range of motion  Skin:     General: Skin is warm and dry  Neurological:      General: No focal deficit present  Mental Status: She is alert     Psychiatric:         Mood and Affect: Mood normal

## 2023-02-17 NOTE — ASSESSMENT & PLAN NOTE
54yo with h/o stage IB breast cancer now s/p CKC for CIN3 presents for post op  Patient has what appears to be normal postoperative discomforts however given complicated postoperative course we will obtain CT to assess and ensure no further abnormalities or causes of her discomforts  We again went into detail about the events of her surgery as well as the likely cause of her pelvic abscess and ileus  Patient is understandably having some difficulty coping with the situation but is slowly improving  Reinforced normal postoperative expectations    Will return prior to possible return to work to reassess condition    I will call her with CT result

## 2023-02-21 DIAGNOSIS — N73.9 PELVIC ABSCESS IN FEMALE: ICD-10-CM

## 2023-02-21 RX ORDER — IBUPROFEN 600 MG/1
600 TABLET ORAL EVERY 6 HOURS PRN
Qty: 30 TABLET | Refills: 0 | Status: SHIPPED | OUTPATIENT
Start: 2023-02-21

## 2023-02-26 ENCOUNTER — HOSPITAL ENCOUNTER (OUTPATIENT)
Dept: CT IMAGING | Facility: HOSPITAL | Age: 53
Discharge: HOME | End: 2023-02-26
Attending: OBSTETRICS & GYNECOLOGY

## 2023-02-26 DIAGNOSIS — N73.9 PELVIC ABSCESS IN FEMALE: ICD-10-CM

## 2023-02-26 RX ADMIN — IOHEXOL 100 ML: 350 INJECTION, SOLUTION INTRAVENOUS at 13:02

## 2023-03-01 ENCOUNTER — TELEPHONE (OUTPATIENT)
Dept: GYNECOLOGIC ONCOLOGY | Facility: CLINIC | Age: 53
End: 2023-03-01

## 2023-03-01 NOTE — TELEPHONE ENCOUNTER
Patient called into the office requesting to speak with Dr Casa Barry after she had her CT scan on 2 26  Patient stated that since drinking the barium swallow that she has had a stomach ache and has not felt well since then   Patient is requesting a phone call back @ [phone]

## 2023-03-01 NOTE — ASSESSMENT & PLAN NOTE
54yo with h/o stage IB breast cancer now s/p CKC for CIN3 presents for post op check    CT reviewed  No further sign of inflammation or collections noted  Still recovering from post op and complication standpoint    RTC 2 weeks for check in

## 2023-03-01 NOTE — TELEPHONE ENCOUNTER
I informed her of the OTC treatments of pepto  or mylanta  She is unclear how to describe the discomfort, she states kind of like gas pains but sharp to the area below her umbilicus but the at times it is higher than that  She reports being uncomfortable moving around  Has had BM-no reported diarrhea  Denies nausea  She will try OTC pepto and Mylanta  She is concerned about returning to work next week if she is still feeling this way  She will call us back in 24-48hours if still no relief of symptoms OR if symptoms become progressively worse

## 2023-03-01 NOTE — PROGRESS NOTES
Assessment/Plan:    Problem List Items Addressed This Visit        Other    S/P cone biopsy of cervix - Primary     56yo with h/o stage IB breast cancer now s/p CKC for CIN3 presents for post op check    CT reviewed  No further sign of inflammation or collections noted  Still recovering from post op and complication standpoint    RTC 2 weeks for check in  Abdominal pain     Symptoms seem c/w bowel issues which may be a result of post ileus and return to normal function  Recommended increasing fiber and monitoring  CT scan negative for collection or SBO  If persists, will refer to GI for further work up  CHIEF COMPLAINT: post op follow up        Problem:   Cancer Staging   Personal history of adenocarcinoma of breast  Staging form: Breast, AJCC 7th Edition  - Pathologic: Stage IA (T1b, N0, cM0) - Signed by Chilo Lopez MD on 5/21/2018        Previous therapy:  Oncology History Overview Note   9/2018 - observation with 81 mg ASA daily     Personal history of adenocarcinoma of breast   4/2009 Initial Diagnosis    Invasive ductal carcinoma of breast, female, right ([facility] [address] )     4/2009 - 5/2009 Cancer Staged     T1 B  N0, ER/NJ positive, HER-2 negative, infiltrating ductal carcinoma  Stage IA, right     5/11/2009 Surgery    Right partial mastectomy, SLNB, breast implant  Dr Javed Reyna     5/2009 Genetic Testing    BRCA negative     2009 - 2009 Radiation    WBRT  Dr Aga Nino     6/2009 - 6/2014 Hormone Therapy    Tamoxifen     Essential thrombocytosis ([facility] [address] )   6/13/2018 Initial Diagnosis    In the past, Plt count had increased and return to normal   Pt was screened for ET with Jorge mutation and was negative  Then in June 2018, platelet count elevated again and mutation testing was completed  8/23/2018 Genomic Testing    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  BCR-aBL was negative via FISH              Patient ID: Barber Horan is a 48 y o  female  56yo with h/o stage IB breast cancer now s/p CKC for CIN3 presents for post op check  Post op was complicated by pelvic infection/ileus requiring dx lap/wash out  Pt reports immediate generalized abdomdinal discomfort/cramping after drinking contrast for CT  This has persisted  She has normal BMs  No pelvic pain  No vaginal bleeding/discharge  No F/C  She continues to feel fatigued and low energy  2/2017: ASCUS/-HPV  11/2017: colpo CIN1  2/2018: NIL  6/2019: AGC/+HPV  6/2020: ASCUS, EMB/ECC neg  12/2020 NIL/-HPV  6/2021 NIL/-HPV  7/2022: NIL/+HPV  9/2022: ECC CIN2-3  1/2023: CKC      The following portions of the patient's history were reviewed and updated as appropriate: allergies, current medications, past family history, past medical history, past social history, past surgical history and problem list     Review of Systems   Constitutional: Positive for fatigue  Negative for appetite change, chills and fever  Respiratory: Negative for chest tightness and shortness of breath  Gastrointestinal: Positive for abdominal pain  Negative for abdominal distention, constipation, diarrhea and nausea  Genitourinary: Negative for difficulty urinating, flank pain, frequency, urgency, vaginal bleeding, vaginal discharge and vaginal pain  Musculoskeletal: Negative for back pain, joint swelling and myalgias  Skin: Negative for rash  Neurological: Negative for dizziness, light-headedness, numbness and headaches           Current Outpatient Medications:   •  aspirin (ECOTRIN LOW STRENGTH) 81 mg EC tablet, Take 81 mg by mouth daily, Disp: , Rfl:   •  ibuprofen (MOTRIN) 600 mg tablet, Take 1 tablet (600 mg total) by mouth every 6 (six) hours as needed for moderate pain, Disp: 30 tablet, Rfl: 0  •  intrauterine copper (PARAGARD) IUD, 1 each by Intrauterine route once, Disp: , Rfl:   •  lidocaine (Lidoderm) 5 %, Apply 1 patch topically over 12 hours daily Remove & Discard patch within 12 hours or as directed by MD, Disp: 6 patch, Rfl: 0  • LORazepam (ATIVAN) 0 5 mg tablet, Take 1 tablet (0 5 mg total) by mouth daily as needed for anxiety, Disp: 20 tablet, Rfl: 0  •  acetaminophen (TYLENOL) 325 mg tablet, Take 2 tablets (650 mg total) by mouth every 6 (six) hours, Disp: 60 tablet, Rfl: 0  •  bisacodyl (DULCOLAX) 10 mg suppository, Insert 1 suppository (10 mg total) into the rectum daily (Patient not taking: Reported on 2/1/2023), Disp: 12 suppository, Rfl: 0  •  hydrocortisone 1 % lotion, Apply topically 2 (two) times a day, Disp: 118 mL, Rfl: 0    Objective:    Blood pressure 110/64, pulse 73, temperature 98 9 °F (37 2 °C), temperature source Temporal, resp  rate 18, height 5' 3" (1 6 m), weight 50 8 kg (112 lb), SpO2 100 %, not currently breastfeeding  Body mass index is 19 84 kg/m²  Body surface area is 1 51 meters squared  Physical Exam  HENT:      Head: Normocephalic and atraumatic  Nose: Nose normal    Cardiovascular:      Rate and Rhythm: Normal rate and regular rhythm  Pulmonary:      Effort: Pulmonary effort is normal    Abdominal:      General: There is no distension  Palpations: Abdomen is soft  There is no mass  Genitourinary:     Comments: defer  Musculoskeletal:         General: No swelling  Normal range of motion  Cervical back: Normal range of motion  Skin:     General: Skin is warm and dry  Neurological:      General: No focal deficit present  Mental Status: She is alert     Psychiatric:         Mood and Affect: Mood normal

## 2023-03-01 NOTE — TELEPHONE ENCOUNTER
Can you follow-up with her? I am assuming this is residual from drinking the barium  She can try OTC pepto bismol/mylanta, etc  I am also happy to provide a few tabs on zofran  CT looked good though, nothing concerning  Thanks!

## 2023-03-03 ENCOUNTER — OFFICE VISIT (OUTPATIENT)
Dept: GYNECOLOGIC ONCOLOGY | Facility: CLINIC | Age: 53
End: 2023-03-03

## 2023-03-03 ENCOUNTER — TELEPHONE (OUTPATIENT)
Dept: GYNECOLOGIC ONCOLOGY | Facility: CLINIC | Age: 53
End: 2023-03-03

## 2023-03-03 VITALS
DIASTOLIC BLOOD PRESSURE: 64 MMHG | WEIGHT: 112 LBS | SYSTOLIC BLOOD PRESSURE: 110 MMHG | TEMPERATURE: 98.9 F | BODY MASS INDEX: 19.84 KG/M2 | OXYGEN SATURATION: 100 % | RESPIRATION RATE: 18 BRPM | HEART RATE: 73 BPM | HEIGHT: 63 IN

## 2023-03-03 DIAGNOSIS — Z98.890 S/P CONE BIOPSY OF CERVIX: Primary | ICD-10-CM

## 2023-03-03 DIAGNOSIS — R10.84 GENERALIZED ABDOMINAL PAIN: ICD-10-CM

## 2023-03-03 PROBLEM — R10.9 ABDOMINAL PAIN: Status: ACTIVE | Noted: 2023-03-03

## 2023-03-03 NOTE — ASSESSMENT & PLAN NOTE
Symptoms seem c/w bowel issues which may be a result of post ileus and return to normal function  Recommended increasing fiber and monitoring  CT scan negative for collection or SBO  If persists, will refer to GI for further work up

## 2023-03-03 NOTE — TELEPHONE ENCOUNTER
Spoke with Patient about Short term disability letter for [employer] (Fax # [phone] and Claim #[id number])  Told patient the letter will state that the STD with be extended to 3/15/23 and patient will return to work only working up to 4 to 5 hours per day for 2 weeks without any lifting restrictions  Then Patient will return to work full time without any restrictions after those 2 weeks  Patient agreed with what is will state  Patient was very pleasant

## 2023-03-10 ENCOUNTER — DOCUMENTATION (OUTPATIENT)
Dept: GYNECOLOGIC ONCOLOGY | Facility: CLINIC | Age: 53
End: 2023-03-10

## 2023-03-10 NOTE — PROGRESS NOTES
Received Paperwork   What type of form  [employer] Disability Forms   Scanned blank form into patient's Epic chart  Yes   Method received form  Fax   Provider responsible for form  Dr Tim Lang   Informed patient our office turn around time for completing patient forms is 5-7 business days    No, received via fax   Comments

## 2023-03-13 ENCOUNTER — TELEPHONE (OUTPATIENT)
Dept: HEMATOLOGY ONCOLOGY | Facility: CLINIC | Age: 53
End: 2023-03-13

## 2023-03-13 ENCOUNTER — TELEMEDICINE (OUTPATIENT)
Dept: GYNECOLOGIC ONCOLOGY | Facility: CLINIC | Age: 53
End: 2023-03-13

## 2023-03-13 DIAGNOSIS — Z98.890 S/P CONE BIOPSY OF CERVIX: Primary | ICD-10-CM

## 2023-03-13 NOTE — ASSESSMENT & PLAN NOTE
54yo with h/o stage IB breast cancer now s/p CKC for CIN3 presents for post op check    Seems to be recovering  She is 8 weeks since surgery but 6 weeks from discharge and resolution of her ileus/inflammatory response  We discussed that it is likely she is still in the recovery phase and that her symptoms do not seem to be concerning  She should continue to increase her activity and diet  If symptoms are persistent in another 2 weeks will consider reimaging as she should be close to baseline at that time point  Airway patent, Nasal mucosa clear. Mouth with normal mucosa. Throat has no vesicles, no oropharyngeal exudates and uvula is midline.

## 2023-03-13 NOTE — TELEPHONE ENCOUNTER
Patient is calling in to confirm that her lab orders are in her chart, I have confirmed that they are and she voiced understanding

## 2023-03-13 NOTE — PROGRESS NOTES
Virtual Regular Visit    Verification of patient location:    Patient is located in the following state in which I hold an active license [place]      Assessment/Plan:    Problem List Items Addressed This Visit        Other    S/P cone biopsy of cervix - Primary     56yo with h/o stage IB breast cancer now s/p CKC for CIN3 presents for post op check    Seems to be recovering  She is 8 weeks since surgery but 6 weeks from discharge and resolution of her ileus/inflammatory response  We discussed that it is likely she is still in the recovery phase and that her symptoms do not seem to be concerning  She should continue to increase her activity and diet  If symptoms are persistent in another 2 weeks will consider reimaging as she should be close to baseline at that time point  Reason for visit is   Chief Complaint   Patient presents with   • Virtual Regular Visit        Encounter provider Martha Strange MD    Provider located at [address] [place] [address] [place] [id number]      Recent Visits  No visits were found meeting these conditions  Showing recent visits within past 7 days and meeting all other requirements  Today's Visits  Date Type Provider Dept   03/13/23 Telemedicine Ilana Hilton [facility] today's visits and meeting all other requirements  Future Appointments  No visits were found meeting these conditions  Showing future appointments within next 150 days and meeting all other requirements       The patient was identified by name and date of birth  Aníbal Price was informed that this is a telemedicine visit and that the visit is being conducted through the [address] [employer] platform  She agrees to proceed     My office door was closed  No one else was in the room  She acknowledged consent and understanding of privacy and security of the video platform   The patient has agreed to participate and understands they can discontinue the visit at any time  Patient is aware this is a billable service  Subjective  Quinton Mcdermott is a 48 y o  female    56yo with h/o stage IB breast cancer now s/p CKC for CIN3 presents for post op check  Post op was complicated by pelvic infection/ileus requiring dx lap/wash out  Repeat CT outpt was WNL  Pt reports improvement in the bloating and discomfort   Her bowels seem to be normal  She still feels "butterflies" and isn't sure if this si pelvic cramping or anxiety driven        7/3729: ASCUS/-HPV  11/2017: colpo CIN1  2/2018: NIL  6/2019: AGC/+HPV  6/2020: ASCUS, EMB/ECC neg  12/2020 NIL/-HPV  6/2021 NIL/-HPV  7/2022: NIL/+HPV  9/2022: ECC CIN2-3  1/2023: CKC       Past Medical History:   Diagnosis Date   • Anxiety    • Bacterial vaginosis    • Cancer [facility])     right breast-  2008  3 lumpectomies/reconstruction   • H/O bilateral breast implants    • History of infection due to human papilloma virus (HPV)     last assessed - [id number]   • HPV (human papilloma virus) infection     cold knife endometrial curettage  today   1/20/2023   • Hx of radiation therapy    • BALDEMAR-2 gene mutation    • Moderate dysplasia of cervix (EMILI II)     last assessed - [id number]   • Ovarian cyst     last assessed - [id number]   • Pap smear abnormality of cervix with ASCUS favoring dysplasia     last assessed - [id number]   • PONV (postoperative nausea and vomiting)    • Secondary amenorrhea    • Thrombocytosis    • Use of tamoxifen (Nolvadex)     last assessed - [id number]   • Varicella        Past Surgical History:   Procedure Laterality Date   • BREAST LUMPECTOMY Right     last assessed - [id number]   • BREAST LUMPECTOMY Right 05/11/2009   • BREAST LUMPECTOMY Right 06/05/2009   • BREAST RECONSTRUCTION Bilateral 10/2018    bilateral implants   • BREAST RECONSTRUCTION Right 02/2019    right implant   • CERVICAL BIOPSY N/A 1/20/2023    Procedure: COLD KNIFE CONE, ENDOMETRIAL CURETTAGE; Surgeon: Wm Davey MD;  Location: [facility];  Service: Gynecology Oncology   • CERVICAL BIOPSY  W/ LOOP ELECTRODE EXCISION     • COLPOSCOPY W/ BIOPSY / CURETTAGE      Colposcopy Cervix with Biopsy(s) with Endocervical Currettage   • CYSTOSCOPY N/A 1/25/2023    Procedure: Pastora Camera;  Surgeon: Kristin Barrett MD;  Location: [facility];  Service: Gynecology Oncology   • MASTECTOMY, PARTIAL Right 05/11/2009    right partial mastectomy re-excision 6/26/09   • VA LAPS ABD PRTM&OMENTUM DX W/WO SPEC BR/WA SPX N/A 1/25/2023    Procedure: LAPAROSCOPY DIAGNOSTIC, PELVIC WASHOUT AND PELVIC DRAIN PLACEMENT;  Surgeon: Kristin Barrett MD;  Location: [facility];  Service: Gynecology Oncology   • SENTINEL LYMPH NODE BIOPSY Right        Current Outpatient Medications   Medication Sig Dispense Refill   • aspirin (ECOTRIN LOW STRENGTH) 81 mg EC tablet Take 81 mg by mouth daily     • ibuprofen (MOTRIN) 600 mg tablet Take 1 tablet (600 mg total) by mouth every 6 (six) hours as needed for moderate pain 30 tablet 0   • intrauterine copper (PARAGARD) IUD 1 each by Intrauterine route once     • LORazepam (ATIVAN) 0 5 mg tablet Take 1 tablet (0 5 mg total) by mouth daily as needed for anxiety 20 tablet 0     No current facility-administered medications for this visit  Allergies   Allergen Reactions   • Cephalexin Rash       Review of Systems   Constitutional: Positive for fatigue  Negative for appetite change, chills and fever  Respiratory: Negative for chest tightness and shortness of breath  Gastrointestinal: Negative for abdominal distention, abdominal pain, constipation, diarrhea and nausea  Genitourinary: Positive for pelvic pain  Negative for difficulty urinating, flank pain, frequency, urgency, vaginal bleeding, vaginal discharge and vaginal pain  Musculoskeletal: Negative for back pain, joint swelling and myalgias  Skin: Negative for rash     Neurological: Negative for dizziness, light-headedness, numbness and headaches  Psychiatric/Behavioral: The patient is nervous/anxious  Video Exam    There were no vitals filed for this visit  Physical Exam   General: Patient appears well-developed  Patient is adequately nourished  Patient is not diaphoretic  Patient is not in distress  Neck: Visualization of the neck demonstrates no grossly visible masses  Neck mobility is not compromised, neck appears supple  HEENT: Oral mucosa appears moist  Patient does not identify palpable neck masses  Patient reports no oral tenderness or readily identifiable masses  Eyes: Conjunctivae appear normal bilaterally  Right eye with no discharge  Left eye with no discharge  No evidence of scleral icterus  No evidence of strabismus  Respiratory: Respiratory effort appears normal  There is no respiratory distress  Patient able to speak in full sentences  There was no audible stridor or cough  Abdomen: Patient states her abdomen is soft  States abdomen is slightly tender  Sutures have fallen out  States abdomen is non-distended  Patient denies visible or palpable bulges to suggest hernias  Musculoskeletal: Patient reports and I can confirm no visible deformities in 4 extremities  Patient reports and I can confirm full mobility in 4 extremities  There is no grossly visible limb edema  There is no evidence of clubbing or peripheral cyanosis  Neurologic: Patient is fully alert and responsive  Patient is oriented to time, place and person  Gross evaluation of CNs III-IV-VI-VII-VIII and XI demonstrates no deficits  Patient reports normal gait and balance  Skin: My evaluation of exposed skin areas reveals no evidence of pallor  My evaluation of exposed skin areas reveals no obvious rashes  My evaluation of exposed skin areas reveals no grossly visible lesions  My evaluation of exposed skin areas reveals no evidence of erythema  Psychiatric / Behavioral: Patient's mood and affect appears normal  Patient's judgement is preserved  Patient is coherent and thought content appears directionally and contextually appropriate for age and health status        I spent 12 minutes directly with the patient during this visit

## 2023-03-14 ENCOUNTER — OFFICE VISIT (OUTPATIENT)
Dept: FAMILY MEDICINE CLINIC | Facility: CLINIC | Age: 53
End: 2023-03-14

## 2023-03-14 ENCOUNTER — TELEPHONE (OUTPATIENT)
Dept: HEMATOLOGY ONCOLOGY | Facility: CLINIC | Age: 53
End: 2023-03-14

## 2023-03-14 ENCOUNTER — APPOINTMENT (OUTPATIENT)
Dept: LAB | Facility: MEDICAL CENTER | Age: 53
End: 2023-03-14

## 2023-03-14 VITALS
TEMPERATURE: 98.9 F | RESPIRATION RATE: 16 BRPM | SYSTOLIC BLOOD PRESSURE: 106 MMHG | WEIGHT: 111 LBS | OXYGEN SATURATION: 98 % | DIASTOLIC BLOOD PRESSURE: 72 MMHG | HEIGHT: 63 IN | HEART RATE: 72 BPM | BODY MASS INDEX: 19.67 KG/M2

## 2023-03-14 DIAGNOSIS — Z00.00 ANNUAL PHYSICAL EXAM: Primary | ICD-10-CM

## 2023-03-14 DIAGNOSIS — Z11.4 SCREENING FOR HIV (HUMAN IMMUNODEFICIENCY VIRUS): ICD-10-CM

## 2023-03-14 DIAGNOSIS — R16.1 SPLENOMEGALY: ICD-10-CM

## 2023-03-14 DIAGNOSIS — Z13.220 LIPID SCREENING: ICD-10-CM

## 2023-03-14 DIAGNOSIS — D47.3 ESSENTIAL THROMBOCYTOSIS (HCC): ICD-10-CM

## 2023-03-14 DIAGNOSIS — D47.3 ESSENTIAL THROMBOCYTOSIS (HCC): Primary | ICD-10-CM

## 2023-03-14 DIAGNOSIS — Z11.59 NEED FOR HEPATITIS C SCREENING TEST: ICD-10-CM

## 2023-03-14 DIAGNOSIS — Z98.890 S/P CONE BIOPSY OF CERVIX: ICD-10-CM

## 2023-03-14 DIAGNOSIS — Z13.29 THYROID DISORDER SCREENING: ICD-10-CM

## 2023-03-14 LAB
ALBUMIN SERPL BCP-MCNC: 4 G/DL (ref 3.5–5)
ALP SERPL-CCNC: 62 U/L (ref 46–116)
ALT SERPL W P-5'-P-CCNC: 20 U/L (ref 12–78)
ANION GAP SERPL CALCULATED.3IONS-SCNC: 4 MMOL/L (ref 4–13)
AST SERPL W P-5'-P-CCNC: 17 U/L (ref 5–45)
BASOPHILS # BLD AUTO: 0.12 THOUSANDS/ÂΜL (ref 0–0.1)
BASOPHILS NFR BLD AUTO: 2 % (ref 0–1)
BILIRUB SERPL-MCNC: 0.68 MG/DL (ref 0.2–1)
BUN SERPL-MCNC: 14 MG/DL (ref 5–25)
CALCIUM SERPL-MCNC: 9.8 MG/DL (ref 8.3–10.1)
CHLORIDE SERPL-SCNC: 109 MMOL/L (ref 96–108)
CO2 SERPL-SCNC: 26 MMOL/L (ref 21–32)
CREAT SERPL-MCNC: 0.84 MG/DL (ref 0.6–1.3)
EOSINOPHIL # BLD AUTO: 0.28 THOUSAND/ÂΜL (ref 0–0.61)
EOSINOPHIL NFR BLD AUTO: 5 % (ref 0–6)
ERYTHROCYTE [DISTWIDTH] IN BLOOD BY AUTOMATED COUNT: 14.6 % (ref 11.6–15.1)
GFR SERPL CREATININE-BSD FRML MDRD: 80 ML/MIN/1.73SQ M
GLUCOSE P FAST SERPL-MCNC: 83 MG/DL (ref 65–99)
HCT VFR BLD AUTO: 43.2 % (ref 34.8–46.1)
HGB BLD-MCNC: 13.9 G/DL (ref 11.5–15.4)
IMM GRANULOCYTES # BLD AUTO: 0.02 THOUSAND/UL (ref 0–0.2)
IMM GRANULOCYTES NFR BLD AUTO: 0 % (ref 0–2)
LYMPHOCYTES # BLD AUTO: 1.84 THOUSANDS/ÂΜL (ref 0.6–4.47)
LYMPHOCYTES NFR BLD AUTO: 33 % (ref 14–44)
MCH RBC QN AUTO: 28 PG (ref 26.8–34.3)
MCHC RBC AUTO-ENTMCNC: 32.2 G/DL (ref 31.4–37.4)
MCV RBC AUTO: 87 FL (ref 82–98)
MONOCYTES # BLD AUTO: 0.43 THOUSAND/ÂΜL (ref 0.17–1.22)
MONOCYTES NFR BLD AUTO: 8 % (ref 4–12)
NEUTROPHILS # BLD AUTO: 2.88 THOUSANDS/ÂΜL (ref 1.85–7.62)
NEUTS SEG NFR BLD AUTO: 52 % (ref 43–75)
NRBC BLD AUTO-RTO: 0 /100 WBCS
PLATELET # BLD AUTO: 769 THOUSANDS/UL (ref 149–390)
PMV BLD AUTO: 10 FL (ref 8.9–12.7)
POTASSIUM SERPL-SCNC: 3.8 MMOL/L (ref 3.5–5.3)
PROT SERPL-MCNC: 7 G/DL (ref 6.4–8.4)
RBC # BLD AUTO: 4.96 MILLION/UL (ref 3.81–5.12)
SODIUM SERPL-SCNC: 139 MMOL/L (ref 135–147)
WBC # BLD AUTO: 5.57 THOUSAND/UL (ref 4.31–10.16)

## 2023-03-14 NOTE — PROGRESS NOTES
[address] [facility] PRIMARY CARE [facility]    NAME: Irina Moody  AGE: 48 y o  SEX: female  : 1970     DATE: 3/14/2023     Assessment and Plan:     Problem List Items Addressed This Visit        Hematopoietic and Hemostatic    Essential thrombocytosis ([address] )       Other    S/P cone biopsy of cervix    Splenomegaly   Other Visit Diagnoses     Annual physical exam    -  Primary    Need for hepatitis C screening test        Relevant Orders    Hepatitis C Antibody ([facility])    Screening for HIV (human immunodeficiency virus)        Relevant Orders    HIV 1/2 AG/AB w Reflex [facility] for 2 yr old and above    Lipid screening        Relevant Orders    Lipid panel    Thyroid disorder screening        Relevant Orders    TSH, 3rd generation          Immunizations and preventive care screenings were discussed with patient today  Appropriate education was printed on patient's after visit summary  Counseling:  Exercise: Avoidance of contact sports  patient with history of breast cancer and EMILI III on biopsy developed pelvic abscess status post drainage see my last note for detail is here for annual physical   She had last appointment with her GYN 3 3  She has been getting better but still has low abdominal discomfort  CT scan 223 showed 17 4 cm span of the spleen  CBC from this morning shows persistent thrombocytosis  Is on baby aspirin  Has an appointment with hematology tomorrow  Also noted on last CT scan was   T11-T12 bony lesion  Bone scan recommended was recommended by radiology given patient's history of breast cancer  I will defer it to hematology oncology discretion  Patient has appointment tomorrow  Mammogram is scheduled in August   Patient has not had a colonoscopy yet  No follow-ups on file       Chief Complaint:     Chief Complaint   Patient presents with   • Physical Exam   • Abdominal discomfort       History of Present Illness:     Adult Annual Physical   Patient here for a comprehensive physical exam  The patient reports problems - As above  Diet and Physical Activity  · Diet/Nutrition: Regular  · Exercise: Has not resumed exercise yet  Depression Screening  PHQ-2/9 Depression Screening         General Health  · Sleep: sleeps well  · Hearing: normal - bilateral   · Vision: no vision problems  · Dental: regular dental visits  /GYN Health  · Patient is: postmenopausal  · Last menstrual period: As above  · Contraceptive method: As above  Review of Systems:     Review of Systems   Constitutional: Negative for chills and fever  Respiratory: Negative for cough and shortness of breath  Cardiovascular: Negative for chest pain, palpitations and leg swelling  Gastrointestinal: Positive for abdominal pain  Negative for diarrhea and nausea  Patient is having regular bowel movement   Neurological: Negative for dizziness  Psychiatric/Behavioral: The patient is nervous/anxious (Since her surgery)         Past Medical History:     Past Medical History:   Diagnosis Date   • Anxiety    • Bacterial vaginosis    • Cancer [facility])     right breast-  2008  3 lumpectomies/reconstruction   • H/O bilateral breast implants    • History of infection due to human papilloma virus (HPV)     last assessed - [id number]   • HPV (human papilloma virus) infection     cold knife endometrial curettage  today   1/20/2023   • Hx of radiation therapy    • BALDEMAR-2 gene mutation    • Moderate dysplasia of cervix (EMILI II)     last assessed - [id number]   • Ovarian cyst     last assessed - [id number]   • Pap smear abnormality of cervix with ASCUS favoring dysplasia     last assessed - [id number]   • PONV (postoperative nausea and vomiting)    • Secondary amenorrhea    • Thrombocytosis    • Use of tamoxifen (Nolvadex)     last assessed - [id number]   • Varicella       Past Surgical History:     Past Surgical History:   Procedure Laterality Date   • BREAST LUMPECTOMY Right     last assessed - [id number]   • BREAST LUMPECTOMY Right 2009   • BREAST LUMPECTOMY Right 2009   • BREAST RECONSTRUCTION Bilateral 10/2018    bilateral implants   • BREAST RECONSTRUCTION Right 2019    right implant   • CERVICAL BIOPSY N/A 2023    Procedure: COLD KNIFE CONE, ENDOMETRIAL CURETTAGE;  Surgeon: Lulú Tobin MD;  [facility];  Service: Gynecology Oncology   • CERVICAL BIOPSY  W/ LOOP ELECTRODE EXCISION     • COLPOSCOPY W/ BIOPSY / CURETTAGE      Colposcopy Cervix with Biopsy(s) with Endocervical Currettage   • CYSTOSCOPY N/A 2023    Procedure: Freddy Marie;  Surgeon: Fidel Andrade MD;  [facility];  Service: Gynecology Oncology   • MASTECTOMY, PARTIAL Right 2009    right partial mastectomy re-excision 09   • FL LAPS ABD PRTM&OMENTUM DX W/WO SPEC BR/WA SPX N/A 2023    Procedure: LAPAROSCOPY DIAGNOSTIC, PELVIC WASHOUT AND PELVIC DRAIN PLACEMENT;  Surgeon: Fidel Andrade MD;  [facility];  Service: Gynecology Oncology   • SENTINEL LYMPH NODE BIOPSY Right       Social History:     Social History     Socioeconomic History   • Marital status: Single     Spouse name: None   • Number of children: None   • Years of education: None   • Highest education level: None   Occupational History   • None   Tobacco Use   • Smoking status: Former     Packs/day: 0 25     Years: 26 00     Pack years: 6 50     Types: Cigarettes     Quit date:      Years since quittin 2   • Smokeless tobacco: Never   • Tobacco comments:     former social smoker   Vaping Use   • Vaping Use: Never used   Substance and Sexual Activity   • Alcohol use:  Yes     Alcohol/week: 2 0 standard drinks     Types: 2 Cans of beer per week     Comment: 4  x   weekly   • Drug use: No   • Sexual activity: Yes     Partners: Male     Birth control/protection: Implant   Other Topics Concern   • None   Social History Narrative    Caffeine use    Marital History -     Jain Affiliation - Adventism    Uses safety equipment - Seat belts     Social Determinants of Health     Financial Resource Strain: Not on file   Food Insecurity: Not on file   Transportation Needs: Not on file   Physical Activity: Not on file   Stress: Not on file   Social Connections: Not on file   Intimate Partner Violence: Not on file   Housing Stability: Not on file      Family History:     Family History   Problem Relation Age of Onset   • No Known Problems Mother    • No Known Problems Father    • No Known Problems Sister    • No Known Problems Maternal Grandmother    • No Known Problems Maternal Grandfather    • Ovarian cancer Paternal Grandmother         age unknown   • No Known Problems Paternal Grandfather    • No Known Problems Maternal Aunt    • Cancer Paternal Aunt 61   • No Known Problems Paternal Aunt    • No Known Problems Paternal Aunt    • Colon polyps Cousin    • Colon cancer Cousin 48   • No Known Problems Half-Sister       Current Medications:     Current Outpatient Medications   Medication Sig Dispense Refill   • aspirin (ECOTRIN LOW STRENGTH) 81 mg EC tablet Take 81 mg by mouth daily     • ibuprofen (MOTRIN) 600 mg tablet Take 1 tablet (600 mg total) by mouth every 6 (six) hours as needed for moderate pain 30 tablet 0   • intrauterine copper (PARAGARD) IUD 1 each by Intrauterine route once     • LORazepam (ATIVAN) 0 5 mg tablet Take 1 tablet (0 5 mg total) by mouth daily as needed for anxiety 20 tablet 0     No current facility-administered medications for this visit  Allergies:      Allergies   Allergen Reactions   • Cephalexin Rash      Physical Exam:     /72 (BP Location: Left arm, Patient Position: Sitting, Cuff Size: Standard)   Pulse 72   Temp 98 9 °F (37 2 °C) (Tympanic)   Resp 16   Ht 5' 3" (1 6 m)   Wt 50 3 kg (111 lb)   SpO2 98%   BMI 19 66 kg/m²     Physical Exam  Constitutional:       General: She is not in acute distress  Appearance: She is not diaphoretic  Eyes:      General: No scleral icterus  Pupils: Pupils are equal, round, and reactive to light  Neck:      Thyroid: No thyromegaly  Cardiovascular:      Rate and Rhythm: Normal rate and regular rhythm  Heart sounds: Normal heart sounds  No murmur heard  Pulmonary:      Effort: Pulmonary effort is normal  No respiratory distress  Breath sounds: Normal breath sounds  No stridor  No wheezing or rales  Abdominal:      General: Bowel sounds are normal  There is no distension  Palpations: Abdomen is soft  There is no mass  Tenderness: There is abdominal tenderness (Lower abdomen tenderness on palpation)  There is no guarding  Musculoskeletal:      Right lower leg: No edema  Left lower leg: No edema  Lymphadenopathy:      Cervical: No cervical adenopathy  Skin:     General: Skin is warm  Neurological:      Mental Status: She is alert and oriented to person, place, and time  Cranial Nerves: No cranial nerve deficit  Coordination: Coordination normal    Psychiatric:         Mood and Affect: Mood is anxious  Affect is tearful           Behavior: Behavior normal           Sunni Flores MD  [address] [facility]

## 2023-03-14 NOTE — TELEPHONE ENCOUNTER
Labs  Route to [facility] calling in Patient   If someone other than patient is calling, are they listed on the communication consent? N/A   Name of ordering Doctor Dr Chris Kemp   Call back number  [phone] - Patient requests a call back  Date of appointment 3/15/23    Patient requesting Lab orders need to be entered Patient is currently at lab     Lab draw location [facility]

## 2023-03-15 ENCOUNTER — OFFICE VISIT (OUTPATIENT)
Dept: HEMATOLOGY ONCOLOGY | Facility: CLINIC | Age: 53
End: 2023-03-15

## 2023-03-15 VITALS
SYSTOLIC BLOOD PRESSURE: 122 MMHG | OXYGEN SATURATION: 100 % | RESPIRATION RATE: 17 BRPM | BODY MASS INDEX: 19.67 KG/M2 | HEART RATE: 79 BPM | WEIGHT: 111 LBS | DIASTOLIC BLOOD PRESSURE: 78 MMHG | HEIGHT: 63 IN

## 2023-03-15 DIAGNOSIS — C50.819 MALIGNANT NEOPLASM OF OVERLAPPING SITES OF BREAST IN FEMALE, ESTROGEN RECEPTOR POSITIVE, UNSPECIFIED LATERALITY (HCC): Primary | ICD-10-CM

## 2023-03-15 DIAGNOSIS — Z85.3 PERSONAL HISTORY OF ADENOCARCINOMA OF BREAST: ICD-10-CM

## 2023-03-15 DIAGNOSIS — R16.1 SPLENOMEGALY: ICD-10-CM

## 2023-03-15 DIAGNOSIS — R93.7 ABNORMAL CT OF THORACIC SPINE: ICD-10-CM

## 2023-03-15 DIAGNOSIS — Z17.0 MALIGNANT NEOPLASM OF OVERLAPPING SITES OF BREAST IN FEMALE, ESTROGEN RECEPTOR POSITIVE, UNSPECIFIED LATERALITY (HCC): Primary | ICD-10-CM

## 2023-03-15 DIAGNOSIS — D47.3 ESSENTIAL THROMBOCYTOSIS (HCC): ICD-10-CM

## 2023-03-15 NOTE — PROGRESS NOTES
[id number] [facility] [address] [place] [address]  [phone]  Hematology Ambulatory Follow-Up  Joe Larkin, 1970, [id number]  3/15/2023    Assessment/Plan:    1  Malignant neoplasm of overlapping sites of breast in female, estrogen receptor positive, unspecified laterality ([address] )  2  Abnormal CT of thoracic spine  3  Essential thrombocytosis (HCC)  4  Splenomegaly  5  Personal history of adenocarcinoma of breast  Patient is a 22-year-old female with a history of JAK2 positive essential thrombocytosis currently on aspirin  Platelets range from 458 up to 769  She was recently hospitalized and required surgery for small bowel obstruction in January 2023  CT scan showed dilated loops of bowel, pelvic fluid, ascites, splenomegaly and a pleural effusion after cone biopsy on 1/20/2023  She was found to have leukocytosis, and positive procalcitonin, started on antibiotics and underwent diagnostic laparoscopy and pelvic washout  Overall patient is recovering  Most recent CBC from 3/14/2023 shows a normal white blood cell count, hemoglobin 13 9, MCV 87, platelets 220, 2% relative basophils and absolute 1 2 metabolic panel is essentially stable  We discussed CT scan results from 2/26/2023 that showed persistent splenomegaly, perisplenic varices, small hypodensity in the spleen is unchanged  There is no evidence of free fluid or inflammatory change within the peritoneal space  There are also 2 small osseous lesions at T11 and 12 may represent hemangiomas  Bone scan or continued surveillance could be considered given patient's history  Patient has a history of early-stage breast cancer in 2009  This was T1b ER [place] positive HER2 negative IDC  She is status post right partial mastectomy sentinel lymph node biopsy and breast implant  She was BRCA negative  Patient received whole breast radiation and tamoxifen for 5 years    We will reimage with an ultrasound of the abdomen and to get a bone scan for further evaluation of the osseous lesions  We considered a CT scan however patient would prefer to have the least invasive testing to start  I will call her with the results and we will manage as indicated  Otherwise we will see her in 1 year with repeat blood work  Patient verbalized understanding and is in agreement with the plan  - XR bone survey complete >12 mos; Future  - US abdomen complete; Future  - CBC and differential; Future  - Comprehensive metabolic panel; Future    Barrier(s) to care: None  The patient is able to self care  [address] [place] [address]    Interval history: Clinically stable    Review of Systems   Constitutional: Negative for activity change, appetite change, fatigue, fever and unexpected weight change  Respiratory: Negative for cough and shortness of breath  Cardiovascular: Negative for chest pain and leg swelling  Gastrointestinal: Negative for abdominal pain, constipation, diarrhea and nausea  Endocrine: Negative for cold intolerance and heat intolerance  Musculoskeletal: Negative for arthralgias and myalgias  Skin: Negative  Neurological: Negative for dizziness, weakness and headaches  Hematological: Negative for adenopathy  Does not bruise/bleed easily       Past Medical History:   Diagnosis Date   • Anxiety    • Bacterial vaginosis    • Cancer [facility])     right breast-  2008  3 lumpectomies/reconstruction   • H/O bilateral breast implants    • History of infection due to human papilloma virus (HPV)     last assessed - [id number]   • HPV (human papilloma virus) infection     cold knife endometrial curettage  today   1/20/2023   • Hx of radiation therapy    • BALDEMAR-2 gene mutation    • Moderate dysplasia of cervix (EMILI II)     last assessed - [id number]   • Ovarian cyst     last assessed - [id number]   • Pap smear abnormality of cervix with ASCUS favoring dysplasia     last assessed - [id number]   • PONV (postoperative nausea and vomiting)    • Secondary amenorrhea    • Thrombocytosis    • Use of tamoxifen (Nolvadex)     last assessed - 05TBS0679   • Varicella      Past Surgical History:   Procedure Laterality Date   • BREAST LUMPECTOMY Right     last assessed - [id number]   • BREAST LUMPECTOMY Right 05/11/2009   • BREAST LUMPECTOMY Right 06/05/2009   • BREAST RECONSTRUCTION Bilateral 10/2018    bilateral implants   • BREAST RECONSTRUCTION Right 02/2019    right implant   • CERVICAL BIOPSY N/A 1/20/2023    Procedure: COLD KNIFE CONE, ENDOMETRIAL CURETTAGE;  Surgeon: Wm Davey MD;  [facility];  Service: Gynecology Oncology   • CERVICAL BIOPSY  W/ LOOP ELECTRODE EXCISION     • COLPOSCOPY W/ BIOPSY / CURETTAGE      Colposcopy Cervix with Biopsy(s) with Endocervical Currettage   • CYSTOSCOPY N/A 1/25/2023    Procedure: Pastora Camera;  Surgeon: Jocy Greene MD;  [facility];  Service: Gynecology Oncology   • MASTECTOMY, PARTIAL Right 05/11/2009    right partial mastectomy re-excision 6/26/09   • GA LAPS ABD PRTM&OMENTUM DX W/WO SPEC BR/WA SPX N/A 1/25/2023    Procedure: LAPAROSCOPY DIAGNOSTIC, PELVIC WASHOUT AND PELVIC DRAIN PLACEMENT;  Surgeon: Jocy Greene MD;  [facility];  Service: Gynecology Oncology   • SENTINEL LYMPH NODE BIOPSY Right        Current Outpatient Medications:   •  aspirin (ECOTRIN LOW STRENGTH) 81 mg EC tablet, Take 81 mg by mouth daily, Disp: , Rfl:   •  ibuprofen (MOTRIN) 600 mg tablet, Take 1 tablet (600 mg total) by mouth every 6 (six) hours as needed for moderate pain, Disp: 30 tablet, Rfl: 0  •  intrauterine copper (PARAGARD) IUD, 1 each by Intrauterine route once, Disp: , Rfl:   •  LORazepam (ATIVAN) 0 5 mg tablet, Take 1 tablet (0 5 mg total) by mouth daily as needed for anxiety, Disp: 20 tablet, Rfl: 0    Allergies   Allergen Reactions   • Cephalexin Rash     Objective:  /78 (BP Location: Left arm, Patient Position: Sitting, Cuff Size: Adult)   Pulse 79   Resp 17   Ht 5' 3" (1 6 m)   Wt 50 3 kg (111 lb)   SpO2 100%   BMI 19 66 kg/m²    Physical Exam  Vitals reviewed  Constitutional:       Appearance: Normal appearance  She is well-developed  HENT:      Head: Normocephalic and atraumatic  Eyes:      Conjunctiva/sclera: Conjunctivae normal       Pupils: Pupils are equal, round, and reactive to light  Pulmonary:      Effort: Pulmonary effort is normal  No respiratory distress  Musculoskeletal:         General: Normal range of motion  Cervical back: Normal range of motion  Lymphadenopathy:      Cervical: No cervical adenopathy  Skin:     General: Skin is dry  Neurological:      Mental Status: She is alert and oriented to person, place, and time     Psychiatric:         Behavior: Behavior normal      Result Review  Labs:  Appointment on 03/14/2023   Component Date Value Ref Range Status   • WBC 03/14/2023 5 57  4 31 - 10 16 Thousand/uL Final   • RBC 03/14/2023 4 96  3 81 - 5 12 Million/uL Final   • Hemoglobin 03/14/2023 13 9  11 5 - 15 4 g/dL Final   • Hematocrit 03/14/2023 43 2  34 8 - 46 1 % Final   • MCV 03/14/2023 87  82 - 98 fL Final   • MCH 03/14/2023 28 0  26 8 - 34 3 pg Final   • MCHC 03/14/2023 32 2  31 4 - 37 4 g/dL Final   • RDW 03/14/2023 14 6  11 6 - 15 1 % Final   • MPV 03/14/2023 10 0  8 9 - 12 7 fL Final   • Platelets 24/17/8946 769 (H)  149 - 390 Thousands/uL Final   • nRBC 03/14/2023 0  /100 WBCs Final   • Neutrophils Relative 03/14/2023 52  43 - 75 % Final   • Immat GRANS % 03/14/2023 0  0 - 2 % Final   • Lymphocytes Relative 03/14/2023 33  14 - 44 % Final   • Monocytes Relative 03/14/2023 8  4 - 12 % Final   • Eosinophils Relative 03/14/2023 5  0 - 6 % Final   • Basophils Relative 03/14/2023 2 (H)  0 - 1 % Final   • Neutrophils Absolute 03/14/2023 2 88  1 85 - 7 62 Thousands/µL Final   • Immature Grans Absolute 03/14/2023 0 02  0 00 - 0 20 Thousand/uL Final   • Lymphocytes Absolute 03/14/2023 1 84  0 60 - 4 47 Thousands/µL Final   • Monocytes Absolute 03/14/2023 0 43  0 17 - 1 22 Thousand/µL Final   • Eosinophils Absolute 03/14/2023 0 28  0 00 - 0 61 Thousand/µL Final   • Basophils Absolute 03/14/2023 0 12 (H)  0 00 - 0 10 Thousands/µL Final   • Sodium 03/14/2023 139  135 - 147 mmol/L Final   • Potassium 03/14/2023 3 8  3 5 - 5 3 mmol/L Final   • Chloride 03/14/2023 109 (H)  96 - 108 mmol/L Final   • CO2 03/14/2023 26  21 - 32 mmol/L Final   • ANION GAP 03/14/2023 4  4 - 13 mmol/L Final   • BUN 03/14/2023 14  5 - 25 mg/dL Final   • Creatinine 03/14/2023 0 84  0 60 - 1 30 mg/dL Final    Standardized to IDMS reference method   • Glucose, Fasting 03/14/2023 83  65 - 99 mg/dL Final    Specimen collection should occur prior to Sulfasalazine administration due to the potential for falsely depressed results  Specimen collection should occur prior to Sulfapyridine administration due to the potential for falsely elevated results  • Calcium 03/14/2023 9 8  8 3 - 10 1 mg/dL Final   • AST 03/14/2023 17  5 - 45 U/L Final    Specimen collection should occur prior to Sulfasalazine administration due to the potential for falsely depressed results  • ALT 03/14/2023 20  12 - 78 U/L Final    Specimen collection should occur prior to Sulfasalazine and/or Sulfapyridine administration due to the potential for falsely depressed results  • Alkaline Phosphatase 03/14/2023 62  46 - 116 U/L Final   • Total Protein 03/14/2023 7 0  6 4 - 8 4 g/dL Final   • Albumin 03/14/2023 4 0  3 5 - 5 0 g/dL Final   • Total Bilirubin 03/14/2023 0 68  0 20 - 1 00 mg/dL Final    Use of this assay is not recommended for patients undergoing treatment with eltrombopag due to the potential for falsely elevated results  • eGFR 03/14/2023 80  ml/min/1 73sq m Final     Please note: This report has been generated by a voice recognition software system   Therefore there may be syntax, spelling, and/or grammatical errors  Please call if you have any questions

## 2023-03-20 DIAGNOSIS — F41.1 ANXIETY IN ACUTE STRESS REACTION: ICD-10-CM

## 2023-03-20 DIAGNOSIS — F43.0 ANXIETY IN ACUTE STRESS REACTION: ICD-10-CM

## 2023-03-20 RX ORDER — LORAZEPAM 0.5 MG/1
0.5 TABLET ORAL DAILY PRN
Qty: 20 TABLET | Refills: 0 | Status: SHIPPED | OUTPATIENT
Start: 2023-03-20

## 2023-04-25 ENCOUNTER — APPOINTMENT (OUTPATIENT)
Dept: RADIOLOGY | Facility: MEDICAL CENTER | Age: 53
End: 2023-04-25

## 2023-04-25 ENCOUNTER — HOSPITAL ENCOUNTER (OUTPATIENT)
Dept: ULTRASOUND IMAGING | Facility: MEDICAL CENTER | Age: 53
Discharge: HOME | End: 2023-04-25

## 2023-04-25 DIAGNOSIS — R93.7 ABNORMAL CT OF THORACIC SPINE: ICD-10-CM

## 2023-04-25 DIAGNOSIS — Z17.0 MALIGNANT NEOPLASM OF OVERLAPPING SITES OF BREAST IN FEMALE, ESTROGEN RECEPTOR POSITIVE, UNSPECIFIED LATERALITY (HCC): ICD-10-CM

## 2023-04-25 DIAGNOSIS — R16.1 SPLENOMEGALY: ICD-10-CM

## 2023-04-25 DIAGNOSIS — C50.819 MALIGNANT NEOPLASM OF OVERLAPPING SITES OF BREAST IN FEMALE, ESTROGEN RECEPTOR POSITIVE, UNSPECIFIED LATERALITY (HCC): ICD-10-CM

## 2023-04-25 DIAGNOSIS — D47.3 ESSENTIAL THROMBOCYTOSIS (HCC): ICD-10-CM

## 2023-05-22 ENCOUNTER — HOSPITAL ENCOUNTER (OUTPATIENT)
Dept: MAMMOGRAPHY | Facility: MEDICAL CENTER | Age: 53
Discharge: HOME | End: 2023-05-22

## 2023-05-22 VITALS — BODY MASS INDEX: 19.65 KG/M2 | WEIGHT: 110.89 LBS | HEIGHT: 63 IN

## 2023-05-22 DIAGNOSIS — Z12.39 BREAST CANCER SCREENING, HIGH RISK PATIENT: ICD-10-CM

## 2023-05-24 ENCOUNTER — OFFICE VISIT (OUTPATIENT)
Dept: SURGICAL ONCOLOGY | Facility: CLINIC | Age: 53
End: 2023-05-24

## 2023-05-24 ENCOUNTER — RA CDI HCC (OUTPATIENT)
Dept: OTHER | Facility: HOSPITAL | Age: 53
End: 2023-05-24

## 2023-05-24 VITALS
TEMPERATURE: 98.7 F | SYSTOLIC BLOOD PRESSURE: 118 MMHG | HEIGHT: 63 IN | DIASTOLIC BLOOD PRESSURE: 78 MMHG | HEART RATE: 90 BPM | WEIGHT: 114 LBS | BODY MASS INDEX: 20.2 KG/M2 | RESPIRATION RATE: 16 BRPM | OXYGEN SATURATION: 98 %

## 2023-05-24 DIAGNOSIS — Z08 ENCOUNTER FOR FOLLOW-UP SURVEILLANCE OF BREAST CANCER: ICD-10-CM

## 2023-05-24 DIAGNOSIS — Z85.3 ENCOUNTER FOR FOLLOW-UP SURVEILLANCE OF BREAST CANCER: ICD-10-CM

## 2023-05-24 DIAGNOSIS — Z85.3 PERSONAL HISTORY OF ADENOCARCINOMA OF BREAST: Primary | ICD-10-CM

## 2023-05-24 DIAGNOSIS — R92.2 DENSE BREAST TISSUE: ICD-10-CM

## 2023-05-24 DIAGNOSIS — Z12.39 BREAST CANCER SCREENING, HIGH RISK PATIENT: ICD-10-CM

## 2023-05-24 NOTE — PROGRESS NOTES
Surgical Oncology Follow Up       [facility] [address]    Alexx Sommer  1970  [id number]  [facility] SURGICAL ONCOLOGY [place]  [place] [address]    Chief Complaint   Patient presents with   • Follow-up       Assessment/Plan   Diagnoses and all orders for this visit:    Personal history of adenocarcinoma of breast  -     Ambulatory Referral to Oncology Genetics; Future    Breast cancer screening, high risk patient  -     Mammo screening bilateral w 3d & cad; Future    Encounter for follow-up surveillance of breast cancer    Dense breast tissue        Advance Care Planning/Advance Directives:  Discussed disease status, cancer treatment plans and/or cancer treatment goals with the patient  Oncology History:    Oncology History Overview Note   9/2018 - observation with 81 mg ASA daily     Personal history of adenocarcinoma of breast   4/2009 Initial Diagnosis    Invasive ductal carcinoma of breast, female, right ([facility] [address] )     4/2009 - 5/2009 Cancer Staged     T1 B  N0, ER/TN positive, HER-2 negative, infiltrating ductal carcinoma  Stage IA, right     5/11/2009 Surgery    Right partial mastectomy, SLNB, breast implant  Dr Josey Wu     5/2009 Genetic Testing    BRCA negative     2009 - 2009 Radiation    WBRT  Dr Onel Pugh     6/2009 - 6/2014 Hormone Therapy    Tamoxifen     Essential thrombocytosis ([facility] [address] )   6/13/2018 Initial Diagnosis    In the past, Plt count had increased and return to normal   Pt was screened for ET with Jorge mutation and was negative  Then in June 2018, platelet count elevated again and mutation testing was completed  8/23/2018 Genomic Testing    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  BCR-aBL was negative via FISH            History of Present Illness: Breast cancer follow-up, no breast referable concerns  -Interval History: Benign mammogram    Review of Systems:  Review of Systems   Constitutional: Negative  Negative for appetite change and fever  Eyes: Negative  Respiratory: Negative for shortness of breath  Cardiovascular: Negative  Gastrointestinal: Negative  Endocrine: Negative  Genitourinary: Negative  Musculoskeletal: Negative  Negative for arthralgias and myalgias  Skin: Negative  Allergic/Immunologic: Negative  Neurological: Negative  Hematological: Negative  Negative for adenopathy  Does not bruise/bleed easily  Psychiatric/Behavioral: Negative          Patient Active Problem List   Diagnosis   • Personal history of adenocarcinoma of breast   • Breast cancer screening, high risk patient   • Essential thrombocytosis ([facility] [address] )   • Encounter for follow-up surveillance of breast cancer   • Dense breast tissue   • EMILI III (cervical intraepithelial neoplasia III)   • HPV (human papilloma virus) infection   • Status post right breast lumpectomy   • PONV (postoperative nausea and vomiting)   • Postoperative ileus (HCC)   • S/P cone biopsy of cervix   • Pelvic fluid collection   • Breast reconstruction deformity   • Erythrocytosis   • S/P breast reconstruction   • Vitamin D deficiency   • Secondary amenorrhea   • ASCUS with positive high risk HPV cervical   • Cellulitis   • Splenomegaly   • Abdominal pain     Past Medical History:   Diagnosis Date   • Anxiety    • Bacterial vaginosis    • Breast cancer ([facility] [address] )    • H/O bilateral breast implants    • History of infection due to human papilloma virus (HPV)     last assessed - [id number]   • HPV (human papilloma virus) infection     cold knife endometrial curettage  today   1/20/2023   • Hx of radiation therapy    • BALDEMAR-2 gene mutation    • Moderate dysplasia of cervix (EMILI II)     last assessed - [id number]   • Ovarian cyst     last assessed - 32HTT2107   • Pap smear abnormality of cervix with ASCUS favoring dysplasia     last assessed - 53EUE5767   • PONV (postoperative nausea and vomiting)    • Secondary amenorrhea    • Thrombocytosis    • Use of tamoxifen (Nolvadex)     last assessed - 23FSH0799   • Varicella      Past Surgical History:   Procedure Laterality Date   • BREAST LUMPECTOMY Right     last assessed - [id number]   • BREAST LUMPECTOMY Right 05/11/2009   • BREAST LUMPECTOMY Right 06/05/2009   • BREAST RECONSTRUCTION Bilateral 10/2018    bilateral implants   • BREAST RECONSTRUCTION Right 02/2019    right implant   • CERVICAL BIOPSY N/A 1/20/2023    Procedure: COLD KNIFE CONE, ENDOMETRIAL CURETTAGE;  Surgeon: Kyle Yoon MD;  [facility];  Service: Gynecology Oncology   • CERVICAL BIOPSY  W/ LOOP ELECTRODE EXCISION     • COLPOSCOPY W/ BIOPSY / CURETTAGE      Colposcopy Cervix with Biopsy(s) with Endocervical Currettage   • CYSTOSCOPY N/A 1/25/2023    Procedure: Annice Ganser;  Surgeon: Nicol Brunner MD;  [facility];  Service: Gynecology Oncology   • MASTECTOMY, PARTIAL Right 05/11/2009    right partial mastectomy re-excision 6/26/09   • MD LAPS ABD PRTM&OMENTUM DX W/WO SPEC BR/WA SPX N/A 1/25/2023    Procedure: LAPAROSCOPY DIAGNOSTIC, PELVIC WASHOUT AND PELVIC DRAIN PLACEMENT;  Surgeon: Nicol Brunner MD;  [facility];  Service: Gynecology Oncology   • SENTINEL LYMPH NODE BIOPSY Right      Family History   Problem Relation Age of Onset   • No Known Problems Mother    • No Known Problems Father    • No Known Problems Sister    • No Known Problems Maternal Grandmother    • No Known Problems Maternal Grandfather    • Ovarian cancer Paternal Grandmother         age unknown   • No Known Problems Paternal Grandfather    • No Known Problems Maternal Aunt    • Cancer Paternal Aunt 61   • No Known Problems Paternal Aunt    • No Known Problems Paternal Aunt    • Colon polyps Cousin    • Colon cancer Cousin 48   • No Known Problems Half-Sister      Social History     Socioeconomic History   • Marital status: Single     Spouse name: Not on file   • Number of children: Not on file   • Years of education: Not on file   • Highest education level: Not on file   Occupational History   • Not on file   Tobacco Use   • Smoking status: Former     Packs/day: 0 25     Years: 26 00     Total pack years: 6 50     Types: Cigarettes     Quit date:      Years since quittin 4   • Smokeless tobacco: Never   • Tobacco comments:     former social smoker   Vaping Use   • Vaping Use: Never used   Substance and Sexual Activity   • Alcohol use:  Yes     Alcohol/week: 2 0 standard drinks of alcohol     Types: 2 Cans of beer per week     Comment: 4  x   weekly   • Drug use: No   • Sexual activity: Yes     Partners: Male     Birth control/protection: Implant   Other Topics Concern   • Not on file   Social History Narrative    Caffeine use    Marital History -     Uatsdin Affiliation - Sikh    Uses safety equipment - Seat belts     Social Determinants of Health     Financial Resource Strain: Not on file   Food Insecurity: Not on file   Transportation Needs: Not on file   Physical Activity: Not on file   Stress: Not on file   Social Connections: Not on file   Intimate Partner Violence: Not on file   Housing Stability: Not on file       Current Outpatient Medications:   •  aspirin (ECOTRIN LOW STRENGTH) 81 mg EC tablet, Take 81 mg by mouth daily, Disp: , Rfl:   •  intrauterine copper (PARAGARD) IUD, 1 each by Intrauterine route once, Disp: , Rfl:   •  LORazepam (ATIVAN) 0 5 mg tablet, Take 1 tablet (0 5 mg total) by mouth daily as needed for anxiety, Disp: 20 tablet, Rfl: 0  Allergies   Allergen Reactions   • Cephalexin Rash       The following portions of the patient's history were reviewed and updated as appropriate: allergies, current medications, past family history, past medical history, past social history, past surgical history and problem list         Vitals:    23 1554   BP: 118/78   Pulse: 90   Resp: 16   Temp: 98 7 °F (37 1 °C) SpO2: 98%       Physical Exam  Constitutional:       General: She is not in acute distress  Appearance: Normal appearance  She is well-developed  HENT:      Head: Normocephalic and atraumatic  Cardiovascular:      Heart sounds: Normal heart sounds  Pulmonary:      Breath sounds: Normal breath sounds  Chest:   Breasts:     Right: Skin change ( lumpectomy scar) present  No inverted nipple, mass, nipple discharge or tenderness  Left: No inverted nipple, mass, nipple discharge, skin change or tenderness  Comments: Bilateral implants  Abdominal:      Palpations: Abdomen is soft  Lymphadenopathy:      Upper Body:      Right upper body: No supraclavicular, axillary or pectoral adenopathy  Left upper body: No supraclavicular, axillary or pectoral adenopathy  Neurological:      Mental Status: She is alert and oriented to person, place, and time  Psychiatric:         Mood and Affect: Mood normal            Results:      Imaging  5/22/2023 bilateral 3D screening mammogram was benign RADS 2 density of 3    I reviewed the above imaging data  Discussion/Summary: 53-year-old female with a history of right breast carcinoma status post right breast conservation  She had radiation therapy and took tamoxifen  There is no evidence of disease based on exam today  Her recent mammogram was benign    I will continue to follow her on an annual basis or sooner should the need arise

## 2023-05-24 NOTE — PROGRESS NOTES
[facility]  coding opportunities       Chart reviewed, no opportunity found: CHART REVIEWED, NO OPPORTUNITY FOUND      This is a reminder to address ALL HCC (risk adjustment) codes as found on active problem list for 2023 as patient scores reset to zero KAMERON      Patients Insurance        Commercial Insurance: [address] [place] [address]

## 2023-05-25 ENCOUNTER — TELEPHONE (OUTPATIENT)
Dept: HEMATOLOGY ONCOLOGY | Facility: CLINIC | Age: 53
End: 2023-05-25

## 2023-05-25 NOTE — TELEPHONE ENCOUNTER
I called Laina Henriquez to schedule a new patient appointment with the Cancer Risk and Genetics Program       Outcome:   I left a voice message encouraging the patient to call the [facility] [place] at [phone] to schedule this appointment  A mychart message was also send with our contact information  Follow-up:   At this time the referral will be closed and we will wait to hear back from the patient regarding scheduling this appointment

## 2023-06-01 ENCOUNTER — OFFICE VISIT (OUTPATIENT)
Dept: FAMILY MEDICINE CLINIC | Facility: CLINIC | Age: 53
End: 2023-06-01

## 2023-06-01 VITALS
DIASTOLIC BLOOD PRESSURE: 70 MMHG | WEIGHT: 112 LBS | HEART RATE: 73 BPM | TEMPERATURE: 97.3 F | HEIGHT: 63 IN | SYSTOLIC BLOOD PRESSURE: 112 MMHG | RESPIRATION RATE: 16 BRPM | OXYGEN SATURATION: 100 % | BODY MASS INDEX: 19.84 KG/M2

## 2023-06-01 DIAGNOSIS — Z98.890 S/P CONE BIOPSY OF CERVIX: Primary | ICD-10-CM

## 2023-06-01 DIAGNOSIS — D06.9 CIN III (CERVICAL INTRAEPITHELIAL NEOPLASIA III): ICD-10-CM

## 2023-06-01 DIAGNOSIS — D47.3 ESSENTIAL THROMBOCYTOSIS (HCC): ICD-10-CM

## 2023-06-01 NOTE — PROGRESS NOTES
[address] [facility] PRIMARY CARE [facility]    NAME: Maria Fernanda Salazar  AGE: 48 y o  SEX: female  : 1970     DATE: 2023     Assessment and Plan:     Problem List Items Addressed This Visit        Hematopoietic and Hemostatic    Essential thrombocytosis ([address] )       Genitourinary    EMILI III (cervical intraepithelial neoplasia III)       Other    S/P cone biopsy of cervix   Other Visit Diagnoses     Annual physical exam    -  Primary          Immunizations and preventive care screenings were discussed with patient today  Appropriate education was printed on patient's after visit summary  Counseling:  · See below         No follow-ups on file  Chief Complaint:     Chief Complaint   Patient presents with   • Follow-up     Crc due, bmi follow up       History of Present Illness:     Adult Annual Physical   Patient here for a comprehensive physical exam  The patient reports problems - As above  Patient is here for annual physical   She is doing very well now  She had recovered quite well from her recent abdominal surgery  He following up with the GYN oncology as well as hematology soon  Recent screen ultrasound showed persistent splenomegaly  She is due for lab studies  Encouraged to get colonoscopy soon  She is also due for shingles vaccination  Diet and Physical Activity  · Diet/Nutrition: well balanced diet  · Exercise: moderate cardiovascular exercise  Depression Screening  PHQ-2/9 Depression Screening    Little interest or pleasure in doing things: 0 - not at all  Feeling down, depressed, or hopeless: 0 - not at all  PHQ-2 Score: 0  PHQ-2 Interpretation: Negative depression screen       General Health  · Sleep: sleeps well  · Hearing: normal - bilateral   · Vision: no vision problems  · Dental: brushes teeth twice daily         /GYN Health  · Patient is: premenopausal  · Last menstrual period: IUD  · Contraceptive method: IUD placement       Review of Systems:     Review of Systems   Past Medical History:     Past Medical History:   Diagnosis Date   • Anxiety    • Bacterial vaginosis    • Breast cancer ([address] )    • H/O bilateral breast implants    • History of infection due to human papilloma virus (HPV)     last assessed - [id number]   • HPV (human papilloma virus) infection     cold knife endometrial curettage  today   1/20/2023   • Hx of radiation therapy    • BALDEMAR-2 gene mutation    • Moderate dysplasia of cervix (EMILI II)     last assessed - [id number]   • Ovarian cyst     last assessed - 76NBW5516   • Pap smear abnormality of cervix with ASCUS favoring dysplasia     last assessed - 03KUE0665   • PONV (postoperative nausea and vomiting)    • Secondary amenorrhea    • Thrombocytosis    • Use of tamoxifen (Nolvadex)     last assessed - 08WKN6733   • Varicella       Past Surgical History:     Past Surgical History:   Procedure Laterality Date   • BREAST LUMPECTOMY Right     last assessed - 06LRG9377   • BREAST LUMPECTOMY Right 05/11/2009   • BREAST LUMPECTOMY Right 06/05/2009   • BREAST RECONSTRUCTION Bilateral 10/2018    bilateral implants   • BREAST RECONSTRUCTION Right 02/2019    right implant   • CERVICAL BIOPSY N/A 1/20/2023    Procedure: COLD KNIFE CONE, ENDOMETRIAL CURETTAGE;  Surgeon: Mari Poe MD;  [facility];  Service: Gynecology Oncology   • CERVICAL BIOPSY  W/ LOOP ELECTRODE EXCISION     • COLPOSCOPY W/ BIOPSY / CURETTAGE      Colposcopy Cervix with Biopsy(s) with Endocervical Currettage   • CYSTOSCOPY N/A 1/25/2023    Procedure: Chuy Zuniga;  Surgeon: Demetria Becker MD;  [facility];  Service: Gynecology Oncology   • MASTECTOMY, PARTIAL Right 05/11/2009    right partial mastectomy re-excision 6/26/09   • [place] LAPS ABD PRTM&OMENTUM DX W/WO SPEC BR/WA SPX N/A 1/25/2023    Procedure: LAPAROSCOPY DIAGNOSTIC, PELVIC WASHOUT AND PELVIC DRAIN PLACEMENT;  Surgeon: Demetria Becker MD;  [facility];  Service: Gynecology Oncology   • SENTINEL LYMPH NODE BIOPSY Right       Social History:     Social History     Socioeconomic History   • Marital status: Single     Spouse name: None   • Number of children: None   • Years of education: None   • Highest education level: None   Occupational History   • None   Tobacco Use   • Smoking status: Former     Packs/day: 0 25     Years: 26 00     Total pack years: 6 50     Types: Cigarettes     Quit date:      Years since quittin 4   • Smokeless tobacco: Never   • Tobacco comments:     former social smoker   Vaping Use   • Vaping Use: Never used   Substance and Sexual Activity   • Alcohol use:  Yes     Alcohol/week: 2 0 standard drinks of alcohol     Types: 2 Cans of beer per week     Comment: 4  x   weekly   • Drug use: No   • Sexual activity: Yes     Partners: Male     Birth control/protection: Implant   Other Topics Concern   • None   Social History Narrative    Caffeine use    Marital History -     Oriental orthodox Affiliation - Nondenominational    Uses safety equipment - Seat belts     Social Determinants of Health     Financial Resource Strain: Not on file   Food Insecurity: Not on file   Transportation Needs: Not on file   Physical Activity: Not on file   Stress: Not on file   Social Connections: Not on file   Intimate Partner Violence: Not on file   Housing Stability: Not on file      Family History:     Family History   Problem Relation Age of Onset   • No Known Problems Mother    • No Known Problems Father    • No Known Problems Sister    • No Known Problems Maternal Grandmother    • No Known Problems Maternal Grandfather    • Ovarian cancer Paternal Grandmother         age unknown   • No Known Problems Paternal Grandfather    • No Known Problems Maternal Aunt    • Cancer Paternal Aunt 61   • No Known Problems Paternal Aunt    • No Known Problems Paternal Aunt    • Colon polyps Cousin    • Colon cancer Cousin 48   • No Known Problems Half-Sister "Current Medications:     Current Outpatient Medications   Medication Sig Dispense Refill   • aspirin (ECOTRIN LOW STRENGTH) 81 mg EC tablet Take 81 mg by mouth daily     • intrauterine copper (PARAGARD) IUD 1 each by Intrauterine route once     • LORazepam (ATIVAN) 0 5 mg tablet Take 1 tablet (0 5 mg total) by mouth daily as needed for anxiety 20 tablet 0     No current facility-administered medications for this visit  Allergies: Allergies   Allergen Reactions   • Cephalexin Rash      Physical Exam:     /70   Pulse 73   Temp (!) 97 3 °F (36 3 °C)   Resp 16   Ht 5' 3\" (1 6 m)   Wt 50 8 kg (112 lb)   LMP  (LMP Unknown)   SpO2 100%   BMI 19 84 kg/m²     Physical Exam  Constitutional:       General: She is not in acute distress  Appearance: She is not diaphoretic  Eyes:      General: No scleral icterus  Conjunctiva/sclera: Conjunctivae normal    Neck:      Thyroid: No thyromegaly  Cardiovascular:      Rate and Rhythm: Normal rate and regular rhythm  Heart sounds: Normal heart sounds  No murmur heard  Pulmonary:      Effort: Pulmonary effort is normal  No respiratory distress  Breath sounds: Normal breath sounds  No stridor  No wheezing or rales  Abdominal:      General: Bowel sounds are normal  There is no distension  Palpations: Abdomen is soft  There is no mass  Tenderness: There is no abdominal tenderness  There is no guarding  Musculoskeletal:      Right lower leg: No edema  Left lower leg: No edema  Lymphadenopathy:      Cervical: No cervical adenopathy  Skin:     General: Skin is warm  Comments: Well-healed laparoscopic incision site   Neurological:      Mental Status: She is alert and oriented to person, place, and time  Cranial Nerves: No cranial nerve deficit        Coordination: Coordination normal    Psychiatric:         Behavior: Behavior normal           Hadley Unger MD  [address]  "

## 2023-06-01 NOTE — PROGRESS NOTES
"Assessment/Plan:           Problem List Items Addressed This Visit        Hematopoietic and Hemostatic    Essential thrombocytosis ([address] )       Genitourinary    EMILI III (cervical intraepithelial neoplasia III)       Other    S/P cone biopsy of cervix - Primary         Subjective:      Patient ID: Janak Vick is a 48 y o  female  HPI patient history of breast cancer 2018 with recent cone biopsy for cervical dysplasia with  Postoperative pelvic abscess status post drainage and has been doing very well  She had recovered quite well and has been exercising reports no further abdominal discomfort  She following up with the GYN oncology as well as hematology soon  Recent screen ultrasound showed persistent splenomegaly  Last platelet count was elevated in March   She is taking a baby aspirin  She is due for lab studies  Encouraged to get colonoscopy soon  She is also due for shingles vaccination  The following portions of the patient's history were reviewed and updated as appropriate: allergies, current medications, past family history, past medical history, past social history, past surgical history and problem list     Review of Systems      Objective:      /70   Pulse 73   Temp (!) 97 3 °F (36 3 °C)   Resp 16   Ht 5' 3\" (1 6 m)   Wt 50 8 kg (112 lb)   LMP  (LMP Unknown)   SpO2 100%   BMI 19 84 kg/m²          Physical Exam  Cardiovascular:      Rate and Rhythm: Normal rate and regular rhythm  Heart sounds: Normal heart sounds  No murmur heard  Pulmonary:      Effort: Pulmonary effort is normal  No respiratory distress  Breath sounds: No wheezing or rales  Abdominal:      General: There is no distension  Tenderness: There is no abdominal tenderness  There is no guarding  Musculoskeletal:      Right lower leg: No edema  Left lower leg: No edema  Neurological:      Mental Status: She is alert     Psychiatric:         Mood and Affect: Mood normal          Behavior: " Behavior normal

## 2023-06-26 ENCOUNTER — TELEPHONE (OUTPATIENT)
Dept: HEMATOLOGY ONCOLOGY | Facility: CLINIC | Age: 53
End: 2023-06-26

## 2023-06-26 NOTE — TELEPHONE ENCOUNTER
I spoke with Brandi Oleary to schedule their consultation with Cancer Risk and Genetics  Scheduling Outcome: Patient is scheduled for an appointment on 12/12/23 at 9:00am  with Dominik Foot    Personal/Family History Related to Appointment:     Personal History of Cancer: Patient reports personal history of Breast ca 2009,    Family History of Cancer: Patient reports family history of father mother overian ca,     Is patient of [address] [place] [address]?: No    History of Genetic Testing:  Personal History of Genetic Testing: Patient reports personal history of Genetic Testing  Details: [id number]    Family History of Genetic Testing: Patient reports that no family members have had Genetic Testing  Progeny:  Is patient able to complete our family history questionnaire on a computer?:  Yes    Patient's preferred e-mail address:   [place] [id number]   Patient Name  (First and Last) Willie Linda   Patient Date of Birth [birth year]   Patient's e-mail address [email or web address]   Appointment date and time 12/12/23  9am   Was patient advised that this questionnaire must be completed on a computer? Yes    [email or web address]  COM

## 2023-07-13 ENCOUNTER — ANNUAL EXAM (OUTPATIENT)
Dept: GYNECOLOGY | Facility: CLINIC | Age: 53
End: 2023-07-13

## 2023-07-13 VITALS
BODY MASS INDEX: 20.38 KG/M2 | WEIGHT: 115 LBS | HEIGHT: 63 IN | DIASTOLIC BLOOD PRESSURE: 72 MMHG | SYSTOLIC BLOOD PRESSURE: 116 MMHG

## 2023-07-13 DIAGNOSIS — Z12.4 SCREENING FOR CERVICAL CANCER: ICD-10-CM

## 2023-07-13 DIAGNOSIS — Z01.419 ENCOUNTER FOR ANNUAL ROUTINE GYNECOLOGICAL EXAMINATION: Primary | ICD-10-CM

## 2023-07-13 DIAGNOSIS — Z11.51 SCREENING FOR HPV (HUMAN PAPILLOMAVIRUS): ICD-10-CM

## 2023-07-13 PROCEDURE — G0476 HPV COMBO ASSAY CA SCREEN: HCPCS | Performed by: OBSTETRICS & GYNECOLOGY

## 2023-07-13 PROCEDURE — G0143 SCR C/V CYTO,THINLAYER,RESCR: HCPCS | Performed by: OBSTETRICS & GYNECOLOGY

## 2023-07-13 NOTE — PROGRESS NOTES
Assessment/Plan:    History of severe cervical dysplasia-pap and HPV done today. She would like to follow-up with GYN onc at some point. The initial plan was to eventually do a hysterectomy after she was healed from the conization and depending on pathology. She is unsure about this now but would like to try have a follow-up and discussion with them. She will schedule this on her own when she is ready. mammogram reviewed with her including breast density. Mammograms ordered by Dr Zak Workman  Discussed self breast exams    colon cancer screening -she has been planning to do a colonoscopy at the end of this year. She understands that she is overdue for this however this has been postponed because of her postop issues from the cold knife conization. IUD-she will maintain this for now. She thinks it has been about a year since her last menstrual cycle. It is not due to be removed and we will discuss next year. The strings are visible and palpable. She will observe for further menstrual cycles    discussed preventive care, regular exercise and a healthy diet      No problem-specific Assessment & Plan notes found for this encounter. Diagnoses and all orders for this visit:    Screening for cervical cancer  -     Liquid-based pap, screening          Subjective:      Patient ID: Felipa Campuzano is a 48 y.o. female. Patient here for yearly. She is status post cold knife conization done earlier this year for severe dysplasia. She has had prior LEEP's done. She had a postop pelvic abscess felt to be due to a pinpoint perforation in the posterior cul-de-sac. Her posterior cervix had very minimal tissue. At the time of the conization, they were concerned about an injury but further exam did not show any evidence of this. She had a laparoscopy to clear out the abscess, antibiotics and she has recovered well. At times, she will still feel some "swelling".   Bowel and bladder function are normal.  The original plan that she made with GYN oncology was to eventually do a hysterectomy. She has not been back for discussion of this yet    Her mammograms are ordered by Dr. Lanre Aguayo. She has a history of breast CA several years ago. 3D mammogram in May was normal with dense tissue      The following portions of the patient's history were reviewed and updated as appropriate: allergies, current medications, past family history, past medical history, past social history, past surgical history and problem list.    Review of Systems   Constitutional: Negative. Gastrointestinal: Negative. Genitourinary: Negative. Objective:      /72   Ht 5' 3" (1.6 m)   Wt 52.2 kg (115 lb)   BMI 20.37 kg/m²          Physical Exam  Vitals reviewed. Constitutional:       Appearance: She is well-developed. Neck:      Thyroid: No thyromegaly. Trachea: No tracheal deviation. Cardiovascular:      Rate and Rhythm: Normal rate and regular rhythm. Pulmonary:      Effort: Pulmonary effort is normal.      Breath sounds: Normal breath sounds. Chest:   Breasts:     Breasts are symmetrical.      Right: No inverted nipple, mass, nipple discharge, skin change or tenderness. Left: No inverted nipple, mass, nipple discharge, skin change or tenderness. Abdominal:      General: There is no distension. Palpations: Abdomen is soft. There is no mass. Tenderness: There is no abdominal tenderness. Genitourinary:     Labia:         Right: No rash, tenderness, lesion or injury. Left: No rash, tenderness, lesion or injury. Vagina: Normal.      Cervix: No cervical motion tenderness, discharge or friability. Adnexa:         Right: No mass, tenderness or fullness. Left: No mass, tenderness or fullness. Rectum: Normal.      Comments: Cervix is healed, consistent with prior conization. There is minimal tissue on the posterior cervix. The IUD strings are just visible at the os.   Pap done without difficulty

## 2023-07-19 LAB
LAB AP GYN PRIMARY INTERPRETATION: NORMAL
Lab: NORMAL

## 2023-10-04 ENCOUNTER — TELEPHONE (OUTPATIENT)
Dept: HEMATOLOGY ONCOLOGY | Facility: CLINIC | Age: 53
End: 2023-10-04

## 2023-10-04 NOTE — TELEPHONE ENCOUNTER
Appointment Schedule   Who are you speaking with? Patient   If it is not the patient, are they listed on an active communication consent form? N/A   Which provider is the appointment scheduled with? Dr. Aristides Montanez   At which location is the appointment scheduled for? [place]   When is the appointment scheduled? Please list date and time 10/24/23 @ 10:45am   What is the reason for this appointment? Follow up   Did patient voice understanding of the details of this appointment? yes   Was the no show policy reviewed with patient?  yes

## 2023-10-24 ENCOUNTER — APPOINTMENT (OUTPATIENT)
Dept: LAB | Facility: MEDICAL CENTER | Age: 53
End: 2023-10-24
Payer: COMMERCIAL

## 2023-10-24 ENCOUNTER — OFFICE VISIT (OUTPATIENT)
Dept: GYNECOLOGIC ONCOLOGY | Facility: CLINIC | Age: 53
End: 2023-10-24
Payer: COMMERCIAL

## 2023-10-24 VITALS
BODY MASS INDEX: 21.33 KG/M2 | WEIGHT: 120.4 LBS | OXYGEN SATURATION: 99 % | HEIGHT: 63 IN | HEART RATE: 68 BPM | SYSTOLIC BLOOD PRESSURE: 150 MMHG | TEMPERATURE: 97.3 F | DIASTOLIC BLOOD PRESSURE: 90 MMHG

## 2023-10-24 DIAGNOSIS — N91.1 SECONDARY AMENORRHEA: ICD-10-CM

## 2023-10-24 DIAGNOSIS — Z11.59 NEED FOR HEPATITIS C SCREENING TEST: ICD-10-CM

## 2023-10-24 DIAGNOSIS — Z11.4 SCREENING FOR HIV (HUMAN IMMUNODEFICIENCY VIRUS): ICD-10-CM

## 2023-10-24 DIAGNOSIS — Z15.09 GENETIC SUSCEPTIBILITY TO CANCER: ICD-10-CM

## 2023-10-24 DIAGNOSIS — D06.9 CIN III (CERVICAL INTRAEPITHELIAL NEOPLASIA III): Primary | ICD-10-CM

## 2023-10-24 DIAGNOSIS — Z85.3 PERSONAL HISTORY OF ADENOCARCINOMA OF BREAST: ICD-10-CM

## 2023-10-24 PROBLEM — R18.8 PELVIC FLUID COLLECTION: Status: RESOLVED | Noted: 2023-01-24 | Resolved: 2023-10-24

## 2023-10-24 PROBLEM — K91.89 POSTOPERATIVE ILEUS (HCC): Status: RESOLVED | Noted: 2023-01-24 | Resolved: 2023-10-24

## 2023-10-24 PROBLEM — K56.7 POSTOPERATIVE ILEUS (HCC): Status: RESOLVED | Noted: 2023-01-24 | Resolved: 2023-10-24

## 2023-10-24 LAB
CANCER AG125 SERPL-ACNC: 4.7 U/ML (ref 0–35)
CHOLEST SERPL-MCNC: 212 MG/DL
FSH SERPL-ACNC: 161.7 MIU/ML
HCV AB SER QL: NORMAL
HDLC SERPL-MCNC: 76 MG/DL
HIV 1+2 AB+HIV1 P24 AG SERPL QL IA: NORMAL
HIV 2 AB SERPL QL IA: NORMAL
HIV1 AB SERPL QL IA: NORMAL
HIV1 P24 AG SERPL QL IA: NORMAL
LDLC SERPL CALC-MCNC: 116 MG/DL (ref 0–100)
NONHDLC SERPL-MCNC: 136 MG/DL
TRIGL SERPL-MCNC: 101 MG/DL
TSH SERPL DL<=0.05 MIU/L-ACNC: 1.69 UIU/ML (ref 0.45–4.5)

## 2023-10-24 PROCEDURE — 36415 COLL VENOUS BLD VENIPUNCTURE: CPT

## 2023-10-24 PROCEDURE — 99214 OFFICE O/P EST MOD 30 MIN: CPT | Performed by: OBSTETRICS & GYNECOLOGY

## 2023-10-24 PROCEDURE — 86803 HEPATITIS C AB TEST: CPT

## 2023-10-24 PROCEDURE — 83001 ASSAY OF GONADOTROPIN (FSH): CPT

## 2023-10-24 PROCEDURE — 86304 IMMUNOASSAY TUMOR CA 125: CPT

## 2023-10-24 PROCEDURE — 87389 HIV-1 AG W/HIV-1&-2 AB AG IA: CPT

## 2023-10-24 NOTE — PROGRESS NOTES
Assessment/Plan:    Problem List Items Addressed This Visit          Genitourinary    EMILI III (cervical intraepithelial neoplasia III) - Primary     Patient underwent excisional procedure by cold knife cone in January 2023. Unfortunately, this was complicated by posterior fornix/Colasacco perforation which required diagnostic laparoscopy, drainage, etc.    Pap in July 2023 demonstrated no evidence of squamous intraepithelial lesion. There was no evidence of high risk HPV at that time. Of note, excisional specimen from general 2023 contains a minute high-grade dysplasia and endocervical pass with clear excisional margins. On exam her cervix is not evaluable but demonstrates no gross abnormalities. IUD strings are not visible but the tip of the IUD is palpable. Plan to reassess with Pap, HPV and endocervical curettage in January. If reassuring, consider yearly follow-up after that. If any abnormality was present, she may benefit from hysterectomy as she has no residual cervix from prior excisional procedures and colposcopy will be noninformative. She will return to my office in January 2023 for reassessment. Other    Personal history of adenocarcinoma of breast     Patient has no evidence of disease as per last assessment. Completed treatment for her early stage, 1B N0 ER/NE positive HER2 negative right breast cancer with whole breast radiotherapy and subsequent tamoxifen. Treatment completed in 2014. Secondary amenorrhea     Patient has a secondary menorrhea. Previous to this some abnormal bleeding and currently reports some atypical discharge. We will obtain [facility] to rule out menopausal status. However, given personal and family history I feel obligated to also work this up further as she could be at increased risk for ovarian/fallopian tube/primary adenocarcinoma. I will obtain  and pelvic ultrasound as updated genetic testing is pending.          Relevant Orders    US pelvis complete non OB    Follicle stimulating hormone    Genetic susceptibility to cancer     Personal history of early onset breast cancer and paternal grandmother with ovarian cancer. Paternal aunt also had uncertain cancer type. Patient had genetic testing through [facility] in 2018. Given updated available panels, patient would benefit from read counseling by genetic testing and consideration of retesting. She is scheduled to see them in early December. I have urged her to keep this appointment. Relevant Orders    US pelvis complete non OB             CHIEF COMPLAINT:   Follow-up for history of high-grade cervical dysplasia, amenorrhea and discharge/pelvic discomfort. Problem:  Cancer Staging   Personal history of adenocarcinoma of breast  Staging form: Breast, AJCC 7th Edition  - Pathologic: Stage IA (T1b, N0, cM0) - Signed by Lydia Spears MD on 5/21/2018        Previous therapy:  Oncology History Overview Note   9/2018 - observation with 81 mg ASA daily     Personal history of adenocarcinoma of breast   4/2009 Initial Diagnosis    Invasive ductal carcinoma of breast, female, right ([address])     4/2009 - 5/2009 Cancer Staged     T1 B. N0, ER/NE positive, HER-2 negative, infiltrating ductal carcinoma. Stage IA, right     5/11/2009 Surgery    Right partial mastectomy, SLNB, breast implant. Dr Angelina Kennedy     5/2009 Genetic Testing    BRCA negative     2009 - 2009 Radiation    WBRT. Dr Stacy Rangel     6/2009 - 6/2014 Hormone Therapy    Tamoxifen     Essential thrombocytosis ([address])   6/13/2018 Initial Diagnosis    In the past, Plt count had increased and return to normal.  Pt was screened for ET with Jorge mutation and was negative. Then in June 2018, platelet count elevated again and mutation testing was completed. 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 BCR-aBL was negative via FISH.             Patient ID: Justin Burns is a 48 y.o. female  HPI  Patient with remote history of early-stage hormone receptor positive breast cancer. Completed treatment in 2014 and has remained with no evidence of disease. She also has history of recurrent high-grade cervical dysplasia. In January underwent cold knife cone with Dr. Lady Burns. This was complicated by posterior fornix perforation likely related to complex procedure given multiple prior resections. I took her back for diagnostic laparoscopy and pelvic drainage/washout. She has done well and recovered fully. She has ParaGard IUD in place. Last assessment by primary gynecologist and July of this year included normal cervical cytology and no evidence of high risk HPV. She reports no menstrual cycles and more than 6 months. However is noticing some uncommon and somewhat atypical clear/pinkish discharge. Occasional pelvic bloating. Importantly, paternal grandmother had ovarian cancer and she had early onset breast cancer. Prior genetic testing in 2018 demonstrated no pathogenic mutations ([email or web address]). She is scheduled to follow-up with genetic counselors in December for consideration of repeat/updated testing. The following portions of the patient's history were reviewed and updated as appropriate: allergies, current medications, past family history, past medical history, past social history, past surgical history, and problem list.    Review of Systems  As per HPI. Review of systems is otherwise noncontributory. Current Outpatient Medications   Medication Sig Dispense Refill    aspirin (ECOTRIN LOW STRENGTH) 81 mg EC tablet Take 81 mg by mouth daily      intrauterine copper (PARAGARD) IUD 1 each by Intrauterine route once      LORazepam (ATIVAN) 0.5 mg tablet Take 1 tablet (0.5 mg total) by mouth daily as needed for anxiety 20 tablet 0     No current facility-administered medications for this visit.            Objective:    Blood pressure 150/90, pulse 68, temperature (!) 97.3 °F (36.3 °C), temperature source Temporal, height 5' 3" (1.6 m), weight 54.6 kg (120 lb 6.4 oz), SpO2 99 %, not currently breastfeeding. Body mass index is 21.33 kg/m². Body surface area is 1.56 meters squared. Physical Exam  Vitals reviewed. Exam conducted with a chaperone present. Constitutional:       General: She is not in acute distress. Appearance: Normal appearance. She is not ill-appearing or toxic-appearing. Eyes:      General: No scleral icterus. Right eye: No discharge. Left eye: No discharge. Conjunctiva/sclera: Conjunctivae normal.   Pulmonary:      Effort: Pulmonary effort is normal.   Musculoskeletal:      Right lower leg: No edema. Left lower leg: No edema. Abdomen: Soft, nontender nondistended. Pelvic: Normal external genitalia. Vulva vagina with no lesions. Mild atrophy noted. Cervix status post multiple excisional procedures with no gross visible abnormalities. On palpation of the minimal residual cervical portio is smooth with no lesions. I can palpate the tip of the IUD. Of note, strings were not visible.     David Reyes MD, [place], On license of [facility] [address]  10/24/2023  11:20 AM

## 2023-10-24 NOTE — LETTER
October 24, 2023     Renard Cotto, [address]    Patient: Joseph Fletcher   YOB: 1970   Date of Visit: 10/24/2023       Dear Dr. Branch Art: Thank you for referring Joseph Fletcher to me for evaluation. Below are my notes for this consultation. If you have questions, please do not hesitate to call me. I look forward to following your patient along with you. Sincerely,        Geetha Malik MD        CC: MD Bin Peacock DO Jhonnie Rusk, MD  10/24/2023 11:21 AM  Incomplete  Assessment/Plan:    Problem List Items Addressed This Visit          Genitourinary    EMILI III (cervical intraepithelial neoplasia III) - Primary     Patient underwent excisional procedure by cold knife cone in January 2023. Unfortunately, this was complicated by posterior fornix/Colasacco perforation which required diagnostic laparoscopy, drainage, etc.    Pap in July 2023 demonstrated no evidence of squamous intraepithelial lesion. There was no evidence of high risk HPV at that time. Of note, excisional specimen from general 2023 contains a minute high-grade dysplasia and endocervical pass with clear excisional margins. On exam her cervix is not evaluable but demonstrates no gross abnormalities. IUD strings are not visible but the tip of the IUD is palpable. Plan to reassess with Pap, HPV and endocervical curettage in January. If reassuring, consider yearly follow-up after that. If any abnormality was present, she may benefit from hysterectomy as she has no residual cervix from prior excisional procedures and colposcopy will be noninformative. She will return to my office in January 2023 for reassessment. Other    Personal history of adenocarcinoma of breast     Patient has no evidence of disease as per last assessment.   Completed treatment for her early stage, 1B N0 ER/DE positive HER2 negative right breast cancer with whole breast radiotherapy and subsequent tamoxifen. Treatment completed in 2014. Secondary amenorrhea     Patient has a secondary menorrhea. Previous to this some abnormal bleeding and currently reports some atypical discharge. We will obtain [id number] to rule out menopausal status. However, given personal and family history I feel obligated to also work this up further as she could be at increased risk for ovarian/fallopian tube/primary adenocarcinoma. I will obtain  and pelvic ultrasound as updated genetic testing is pending. Relevant Orders    US pelvis complete non OB    Follicle stimulating hormone    Genetic susceptibility to cancer     Personal history of early onset breast cancer and paternal grandmother with ovarian cancer. Paternal aunt also had uncertain cancer type. Patient had genetic testing through [facility] in 2018. Given updated available panels, patient would benefit from read counseling by genetic testing and consideration of retesting. She is scheduled to see them in early December. I have urged her to keep this appointment. Relevant Orders    US pelvis complete non OB             CHIEF COMPLAINT:   Follow-up for history of high-grade cervical dysplasia, amenorrhea and discharge/pelvic discomfort. Problem:  Cancer Staging   Personal history of adenocarcinoma of breast  Staging form: Breast, AJCC 7th Edition  - Pathologic: Stage IA (T1b, N0, cM0) - Signed by Romel Aguilar MD on 5/21/2018        Previous therapy:  Oncology History Overview Note   9/2018 - observation with 81 mg ASA daily     Personal history of adenocarcinoma of breast   4/2009 Initial Diagnosis    Invasive ductal carcinoma of breast, female, right ([address])     4/2009 - 5/2009 Cancer Staged     T1 B. N0, ER/DE positive, HER-2 negative, infiltrating ductal carcinoma. Stage IA, right     5/11/2009 Surgery    Right partial mastectomy, SLNB, breast implant.   Dr John Cortés     5/2009 Genetic Testing    BRCA negative     2009 - 2009 Radiation    WBRT. Dr Lopes Fairly     6/2009 - 6/2014 Hormone Therapy    Tamoxifen     Essential thrombocytosis ([address])   6/13/2018 Initial Diagnosis    In the past, Plt count had increased and return to normal.  Pt was screened for ET with Jorge mutation and was negative. Then in June 2018, platelet count elevated again and mutation testing was completed. 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 BCR-aBL was negative via FISH. Patient ID: Tony Keane is a 48 y.o. female  HPI  Patient with remote history of early-stage hormone receptor positive breast cancer. Completed treatment in 2014 and has remained with no evidence of disease. She also has history of recurrent high-grade cervical dysplasia. In January underwent cold knife cone with Dr. Zee Ashton. This was complicated by posterior fornix perforation likely related to complex procedure given multiple prior resections. I took her back for diagnostic laparoscopy and pelvic drainage/washout. She has done well and recovered fully. She has ParaGard IUD in place. Last assessment by primary gynecologist and July of this year included normal cervical cytology and no evidence of high risk HPV. She reports no menstrual cycles and more than 6 months. However is noticing some uncommon and somewhat atypical clear/pinkish discharge. Occasional pelvic bloating. Importantly, paternal grandmother had ovarian cancer and she had early onset breast cancer. Prior genetic testing in 2018 demonstrated no pathogenic mutations ([employer]). She is scheduled to follow-up with genetic counselors in December for consideration of repeat/updated testing. The following portions of the patient's history were reviewed and updated as appropriate: allergies, current medications, past family history, past medical history, past social history, past surgical history, and problem list.    Review of Systems  As per HPI. Review of systems is otherwise noncontributory. Current Outpatient Medications   Medication Sig Dispense Refill   • aspirin (ECOTRIN LOW STRENGTH) 81 mg EC tablet Take 81 mg by mouth daily     • intrauterine copper (PARAGARD) IUD 1 each by Intrauterine route once     • LORazepam (ATIVAN) 0.5 mg tablet Take 1 tablet (0.5 mg total) by mouth daily as needed for anxiety 20 tablet 0     No current facility-administered medications for this visit. Objective:    Blood pressure 150/90, pulse 68, temperature (!) 97.3 °F (36.3 °C), temperature source Temporal, height 5' 3" (1.6 m), weight 54.6 kg (120 lb 6.4 oz), SpO2 99 %, not currently breastfeeding. Body mass index is 21.33 kg/m². Body surface area is 1.56 meters squared. Physical Exam  Vitals reviewed. Exam conducted with a chaperone present. Constitutional:       General: She is not in acute distress. Appearance: Normal appearance. She is not ill-appearing or toxic-appearing. Eyes:      General: No scleral icterus. Right eye: No discharge. Left eye: No discharge. Conjunctiva/sclera: Conjunctivae normal.   Pulmonary:      Effort: Pulmonary effort is normal.   Musculoskeletal:      Right lower leg: No edema. Left lower leg: No edema. Abdomen: Soft, nontender nondistended. Pelvic: Normal external genitalia. Vulva vagina with no lesions. Mild atrophy noted. Cervix status post multiple excisional procedures with no gross visible abnormalities. On palpation of the minimal residual cervical portio is smooth with no lesions. I can palpate the tip of the IUD. Of note, strings were not visible.     Taylor Nichole MD, [place], [facility]  10/24/2023  11:20 AM

## 2023-10-24 NOTE — ASSESSMENT & PLAN NOTE
Patient has a secondary menorrhea. Previous to this some abnormal bleeding and currently reports some atypical discharge. We will obtain [facility] to rule out menopausal status. However, given personal and family history I feel obligated to also work this up further as she could be at increased risk for ovarian/fallopian tube/primary adenocarcinoma. I will obtain  and pelvic ultrasound as updated genetic testing is pending.

## 2023-10-24 NOTE — ASSESSMENT & PLAN NOTE
Personal history of early onset breast cancer and paternal grandmother with ovarian cancer. Paternal aunt also had uncertain cancer type. Patient had genetic testing through [facility] in 2018. Given updated available panels, patient would benefit from read counseling by genetic testing and consideration of retesting. She is scheduled to see them in early December. I have urged her to keep this appointment.

## 2023-10-24 NOTE — PATIENT INSTRUCTIONS
Keep appointment for pelvic ultrasound and blood work. Keep appointment with genetic counselors in December. Call if you have new symptoms. Keep appointment for repeat Pap in January.

## 2023-10-24 NOTE — ASSESSMENT & PLAN NOTE
Patient underwent excisional procedure by cold knife cone in January 2023. Unfortunately, this was complicated by posterior fornix/Colasacco perforation which required diagnostic laparoscopy, drainage, etc.    Pap in July 2023 demonstrated no evidence of squamous intraepithelial lesion. There was no evidence of high risk HPV at that time. Of note, excisional specimen from general 2023 contains a minute high-grade dysplasia and endocervical pass with clear excisional margins. On exam her cervix is not evaluable but demonstrates no gross abnormalities. IUD strings are not visible but the tip of the IUD is palpable. Plan to reassess with Pap, HPV and endocervical curettage in January. If reassuring, consider yearly follow-up after that. If any abnormality was present, she may benefit from hysterectomy as she has no residual cervix from prior excisional procedures and colposcopy will be noninformative. She will return to my office in January 2023 for reassessment.

## 2023-10-24 NOTE — ASSESSMENT & PLAN NOTE
Patient has no evidence of disease as per last assessment. Completed treatment for her early stage, 1B N0 ER/AR positive HER2 negative right breast cancer with whole breast radiotherapy and subsequent tamoxifen. Treatment completed in 2014.

## 2023-10-26 ENCOUNTER — HOSPITAL ENCOUNTER (OUTPATIENT)
Dept: ULTRASOUND IMAGING | Facility: HOSPITAL | Age: 53
Discharge: HOME | End: 2023-10-26
Attending: OBSTETRICS & GYNECOLOGY
Payer: COMMERCIAL

## 2023-10-26 DIAGNOSIS — Z15.09 GENETIC SUSCEPTIBILITY TO CANCER: ICD-10-CM

## 2023-10-26 DIAGNOSIS — N91.1 SECONDARY AMENORRHEA: ICD-10-CM

## 2023-10-26 PROCEDURE — 76856 US EXAM PELVIC COMPLETE: CPT

## 2023-10-26 PROCEDURE — 76830 TRANSVAGINAL US NON-OB: CPT

## 2023-10-31 ENCOUNTER — OFFICE VISIT (OUTPATIENT)
Dept: GYNECOLOGIC ONCOLOGY | Facility: CLINIC | Age: 53
End: 2023-10-31
Payer: COMMERCIAL

## 2023-10-31 VITALS
OXYGEN SATURATION: 100 % | TEMPERATURE: 97.5 F | RESPIRATION RATE: 16 BRPM | BODY MASS INDEX: 21.33 KG/M2 | WEIGHT: 120.4 LBS | DIASTOLIC BLOOD PRESSURE: 80 MMHG | SYSTOLIC BLOOD PRESSURE: 128 MMHG | HEIGHT: 63 IN | HEART RATE: 77 BPM

## 2023-10-31 DIAGNOSIS — T83.39XA RETAINED INTRAUTERINE CONTRACEPTIVE DEVICE (IUD): Primary | ICD-10-CM

## 2023-10-31 PROCEDURE — 99214 OFFICE O/P EST MOD 30 MIN: CPT | Performed by: OBSTETRICS & GYNECOLOGY

## 2023-10-31 RX ORDER — CELECOXIB 200 MG/1
200 CAPSULE ORAL ONCE
OUTPATIENT
Start: 2023-10-31 | End: 2023-10-31

## 2023-10-31 NOTE — PROGRESS NOTES
Assessment/Plan:    Problem List Items Addressed This Visit          Other    Retained intrauterine contraceptive device (IUD) - Primary     Today IUD was removed. However, one of the arms broke and is retained and seems embedded in the uterine wall. I will obtain x-ray to further assess. We discussed with patient potential options including awaiting for cervical assessment in January indication needs hysterectomy. However, she wants to proceed with definitive hysteroscopy and removal.    Today she counseled to proceed with hysteroscopy and IUD fragment removal and all other indicated procedures. She understands if IUD is not visible during hysteroscopy we will not proceed further with laparoscopy as she may eventually need a hysterectomy anyways. Patient is young and healthy and all preoperative testing or additional work-up is necessary. The patient was counseled regarding indications, risks, benefits and alternatives to surgical management. We discussed risks including but not limited to bleeding and potential need for blood transfusions, infection, pain, injury to surrounding organs such as bladder, intestines, ureters and neurovascular structures. Patient understands potential risks associated with stress of surgery and general anesthesia including but not limited to acute cardiac events, venous thromboembolism, etc.  Patient consents for blood transfusions if those are deemed necessary during the course of care. She understands risks include but are not limited to hypersensitivity reactions and infection with blood-borne pathogens. Patient verbalizes understanding, agrees and wants to proceed. Informed consent form signed. All questions answered to patient's satisfaction. Relevant Orders    XR abdomen 1 view kub    Case request operating room: HYSTEROSCOPY, IUD FRAGMENT REMOVAL (Completed)           CHIEF COMPLAINT:   IUD removal attempt, preoperative visit.     Problem:  Cancer Staging   Personal history of adenocarcinoma of breast  Staging form: Breast, AJCC 7th Edition  - Pathologic: Stage IA (T1b, N0, cM0) - Signed by Migdalia Habermann, MD on 5/21/2018      Previous therapy:  Oncology History Overview Note   9/2018 - observation with 81 mg ASA daily     Personal history of adenocarcinoma of breast   4/2009 Initial Diagnosis    Invasive ductal carcinoma of breast, female, right ([address])     4/2009 - 5/2009 Cancer Staged     T1 B. N0, ER/WA positive, HER-2 negative, infiltrating ductal carcinoma. Stage IA, right     5/11/2009 Surgery    Right partial mastectomy, SLNB, breast implant. Dr Woodson Stands     5/2009 Genetic Testing    BRCA negative     2009 - 2009 Radiation    WBRT. Dr Kyler Amaya     6/2009 - 6/2014 Hormone Therapy    Tamoxifen     Essential thrombocytosis ([address])   6/13/2018 Initial Diagnosis    In the past, Plt count had increased and return to normal.  Pt was screened for ET with Jorge mutation and was negative. Then in June 2018, platelet count elevated again and mutation testing was completed. 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 BCR-aBL was negative via FISH. Patient ID: Lindsey Echavarria is a 48 y.o. female  HPI  Patient with history of breast cancer, IUD in place now prevent menopause. She also has recurrent cervical dysplasia status post multiple excisional procedures. She is having pelvic discomfort and drainage and wants IUD removed. Today presented for IUD removal.  During attempted removal the IUD fractured suggesting retained fragment in the low uterine segment/cervical wall. She was to proceed with hysteroscopy for attempt at resection. Review of Systems  As per HPI. Upon review of systems otherwise unremarkable.   Current Outpatient Medications   Medication Sig Dispense Refill    aspirin (ECOTRIN LOW STRENGTH) 81 mg EC tablet Take 81 mg by mouth daily      intrauterine copper (PARAGARD) IUD 1 each by Intrauterine route once      LORazepam (ATIVAN) 0.5 mg tablet Take 1 tablet (0.5 mg total) by mouth daily as needed for anxiety 20 tablet 0     No current facility-administered medications for this visit.        Allergies   Allergen Reactions    Cephalexin Rash       Past Medical History:   Diagnosis Date    Anxiety     Bacterial vaginosis     Breast cancer ([address])     H/O bilateral breast implants     History of infection due to human papilloma virus (HPV)     last assessed - [id number]    HPV (human papilloma virus) infection     cold knife endometrial curettage  today   1/20/2023    Hx of radiation therapy     BALDEMAR-2 gene mutation     Moderate dysplasia of cervix (EMILI II)     last assessed - [id number]    Ovarian cyst     last assessed - [id number]    Pap smear abnormality of cervix with ASCUS favoring dysplasia     last assessed - [id number]    PONV (postoperative nausea and vomiting)     Secondary amenorrhea     Thrombocytosis     Use of tamoxifen (Nolvadex)     last assessed - [id number]    Varicella        Past Surgical History:   Procedure Laterality Date    BREAST LUMPECTOMY Right     last assessed - [id number]    BREAST LUMPECTOMY Right 05/11/2009    BREAST LUMPECTOMY Right 06/05/2009    BREAST RECONSTRUCTION Bilateral 10/2018    bilateral implants    BREAST RECONSTRUCTION Right 02/2019    right implant    CERVICAL BIOPSY N/A 1/20/2023    Procedure: COLD KNIFE CONE, ENDOMETRIAL CURETTAGE;  Surgeon: Jania Avalos MD;  [facility];  Service: Gynecology Oncology    CERVICAL BIOPSY  W/ LOOP ELECTRODE EXCISION      COLPOSCOPY W/ BIOPSY / CURETTAGE      Colposcopy Cervix with Biopsy(s) with Endocervical Currettage    CYSTOSCOPY N/A 1/25/2023    Procedure: Eulis Gitelman;  Surgeon: Thelma Whiteside MD;  [facility];  Service: Gynecology Oncology    MASTECTOMY, PARTIAL Right 05/11/2009    right partial mastectomy re-excision 6/26/09    NC LAPS ABD PRTM&OMENTUM DX W/WO SPEC BR/WA SPX N/A 1/25/2023    Procedure: LAPAROSCOPY DIAGNOSTIC, PELVIC WASHOUT AND PELVIC DRAIN PLACEMENT;  Surgeon: Nataliia Kinsey MD;  [facility];  Service: Gynecology Oncology    SENTINEL LYMPH NODE BIOPSY Right        OB History          0    Para   0    Term   0       0    AB   0    Living   0         SAB   0    IAB   0    Ectopic   0    Multiple   0    Live Births   0           Obstetric Comments   17years old (menarche)               Family History   Problem Relation Age of Onset    No Known Problems Mother     No Known Problems Father     No Known Problems Sister     No Known Problems Maternal Grandmother     No Known Problems Maternal Grandfather     Ovarian cancer Paternal Grandmother         age unknown    No Known Problems Paternal Grandfather     No Known Problems Maternal Aunt     Cancer Paternal Aunt 61    No Known Problems Paternal Aunt     No Known Problems Paternal Aunt     Colon polyps Cousin     Colon cancer Cousin 48    No Known Problems Half-Sister        The following portions of the patient's history were reviewed and updated as appropriate: allergies, current medications, past family history, past medical history, past social history, past surgical history, and problem list.      Objective:    Blood pressure 128/80, pulse 77, temperature 97.5 °F (36.4 °C), temperature source Temporal, resp. rate 16, height 5' 3" (1.6 m), weight 54.6 kg (120 lb 6.4 oz), SpO2 100 %, not currently breastfeeding. Body mass index is 21.33 kg/m². Physical Exam  Vitals reviewed. Exam conducted with a chaperone present. Constitutional:       Appearance: Normal appearance. Cardiovascular:      Rate and Rhythm: Normal rate and regular rhythm. Pulmonary:      Effort: Pulmonary effort is normal. No respiratory distress. Breath sounds: Normal breath sounds. Abdominal:      General: There is no distension. Palpations: There is no mass. Tenderness: There is no abdominal tenderness.    Genitourinary: Comments: Normal external genitalia. Vulva vagina with no lesions. Cervix status post multiple excisions, visible white tip of the IUD was grasped and pulled, main ritchie and 1 side branch were retrieved but 1 side branch was retained in the uterus. Patient tolerated well. Musculoskeletal:      Right lower leg: No edema. Left lower leg: No edema. Neurological:      Mental Status: She is alert.        Melinda Lopez MD, [place], [place]  10/31/2023  4:10 PM

## 2023-10-31 NOTE — ASSESSMENT & PLAN NOTE
Today IUD was removed. However, one of the arms broke and is retained and seems embedded in the uterine wall. I will obtain x-ray to further assess. We discussed with patient potential options including awaiting for cervical assessment in January indication needs hysterectomy. However, she wants to proceed with definitive hysteroscopy and removal.    Today she counseled to proceed with hysteroscopy and IUD fragment removal and all other indicated procedures. She understands if IUD is not visible during hysteroscopy we will not proceed further with laparoscopy as she may eventually need a hysterectomy anyways. Patient is young and healthy and all preoperative testing or additional work-up is necessary. The patient was counseled regarding indications, risks, benefits and alternatives to surgical management. We discussed risks including but not limited to bleeding and potential need for blood transfusions, infection, pain, injury to surrounding organs such as bladder, intestines, ureters and neurovascular structures. Patient understands potential risks associated with stress of surgery and general anesthesia including but not limited to acute cardiac events, venous thromboembolism, etc.  Patient consents for blood transfusions if those are deemed necessary during the course of care. She understands risks include but are not limited to hypersensitivity reactions and infection with blood-borne pathogens. Patient verbalizes understanding, agrees and wants to proceed. Informed consent form signed. All questions answered to patient's satisfaction.

## 2023-10-31 NOTE — H&P (VIEW-ONLY)
Assessment/Plan:    Problem List Items Addressed This Visit          Other    Retained intrauterine contraceptive device (IUD) - Primary     Today IUD was removed. However, one of the arms broke and is retained and seems embedded in the uterine wall. I will obtain x-ray to further assess. We discussed with patient potential options including awaiting for cervical assessment in January indication needs hysterectomy. However, she wants to proceed with definitive hysteroscopy and removal.    Today she counseled to proceed with hysteroscopy and IUD fragment removal and all other indicated procedures. She understands if IUD is not visible during hysteroscopy we will not proceed further with laparoscopy as she may eventually need a hysterectomy anyways. Patient is young and healthy and all preoperative testing or additional work-up is necessary. The patient was counseled regarding indications, risks, benefits and alternatives to surgical management. We discussed risks including but not limited to bleeding and potential need for blood transfusions, infection, pain, injury to surrounding organs such as bladder, intestines, ureters and neurovascular structures. Patient understands potential risks associated with stress of surgery and general anesthesia including but not limited to acute cardiac events, venous thromboembolism, etc.  Patient consents for blood transfusions if those are deemed necessary during the course of care. She understands risks include but are not limited to hypersensitivity reactions and infection with blood-borne pathogens. Patient verbalizes understanding, agrees and wants to proceed. Informed consent form signed. All questions answered to patient's satisfaction. Relevant Orders    XR abdomen 1 view kub    Case request operating room: HYSTEROSCOPY, IUD FRAGMENT REMOVAL (Completed)           CHIEF COMPLAINT:   IUD removal attempt, preoperative visit.     Problem:  Cancer Staging   Personal history of adenocarcinoma of breast  Staging form: Breast, AJCC 7th Edition  - Pathologic: Stage IA (T1b, N0, cM0) - Signed by Martha Crabtree MD on 5/21/2018      Previous therapy:  Oncology History Overview Note   9/2018 - observation with 81 mg ASA daily     Personal history of adenocarcinoma of breast   4/2009 Initial Diagnosis    Invasive ductal carcinoma of breast, female, right ([address])     4/2009 - 5/2009 Cancer Staged     T1 B. N0, ER/KS positive, HER-2 negative, infiltrating ductal carcinoma. Stage IA, right     5/11/2009 Surgery    Right partial mastectomy, SLNB, breast implant. Dr Ron Knight     5/2009 Genetic Testing    BRCA negative     2009 - 2009 Radiation    WBRT. Dr Augustine Lennox     6/2009 - 6/2014 Hormone Therapy    Tamoxifen     Essential thrombocytosis ([address])   6/13/2018 Initial Diagnosis    In the past, Plt count had increased and return to normal.  Pt was screened for ET with Jorge mutation and was negative. Then in June 2018, platelet count elevated again and mutation testing was completed. 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 BCR-aBL was negative via FISH. Patient ID: sUha Johnson is a 48 y.o. female  HPI  Patient with history of breast cancer, IUD in place now prevent menopause. She also has recurrent cervical dysplasia status post multiple excisional procedures. She is having pelvic discomfort and drainage and wants IUD removed. Today presented for IUD removal.  During attempted removal the IUD fractured suggesting retained fragment in the low uterine segment/cervical wall. She was to proceed with hysteroscopy for attempt at resection. Review of Systems  As per HPI. Upon review of systems otherwise unremarkable.   Current Outpatient Medications   Medication Sig Dispense Refill    aspirin (ECOTRIN LOW STRENGTH) 81 mg EC tablet Take 81 mg by mouth daily      intrauterine copper (PARAGARD) IUD 1 each by Intrauterine route once      LORazepam (ATIVAN) 0.5 mg tablet Take 1 tablet (0.5 mg total) by mouth daily as needed for anxiety 20 tablet 0     No current facility-administered medications for this visit.        Allergies   Allergen Reactions    Cephalexin Rash       Past Medical History:   Diagnosis Date    Anxiety     Bacterial vaginosis     Breast cancer ([address])     H/O bilateral breast implants     History of infection due to human papilloma virus (HPV)     last assessed - [id number]    HPV (human papilloma virus) infection     cold knife endometrial curettage  today   1/20/2023    Hx of radiation therapy     BALDEMAR-2 gene mutation     Moderate dysplasia of cervix (EMILI II)     last assessed - [id number]    Ovarian cyst     last assessed - [id number]    Pap smear abnormality of cervix with ASCUS favoring dysplasia     last assessed - [id number]    PONV (postoperative nausea and vomiting)     Secondary amenorrhea     Thrombocytosis     Use of tamoxifen (Nolvadex)     last assessed - [id number]    Varicella        Past Surgical History:   Procedure Laterality Date    BREAST LUMPECTOMY Right     last assessed - [id number]    BREAST LUMPECTOMY Right 05/11/2009    BREAST LUMPECTOMY Right 06/05/2009    BREAST RECONSTRUCTION Bilateral 10/2018    bilateral implants    BREAST RECONSTRUCTION Right 02/2019    right implant    CERVICAL BIOPSY N/A 1/20/2023    Procedure: COLD KNIFE CONE, ENDOMETRIAL CURETTAGE;  Surgeon: Molly Hughes MD;  [facility];  Service: Gynecology Oncology    CERVICAL BIOPSY  W/ LOOP ELECTRODE EXCISION      COLPOSCOPY W/ BIOPSY / CURETTAGE      Colposcopy Cervix with Biopsy(s) with Endocervical Currettage    CYSTOSCOPY N/A 1/25/2023    Procedure: Isabella Miller;  Surgeon: Karo Grossman MD;  [facility];  Service: Gynecology Oncology    MASTECTOMY, PARTIAL Right 05/11/2009    right partial mastectomy re-excision 6/26/09    DE LAPS ABD PRTM&OMENTUM DX W/WO SPEC BR/WA SPX N/A 1/25/2023    Procedure: LAPAROSCOPY DIAGNOSTIC, PELVIC WASHOUT AND PELVIC DRAIN PLACEMENT;  Surgeon: Geetha Malik MD;  [facility];  Service: Gynecology Oncology    SENTINEL LYMPH NODE BIOPSY Right        OB History          0    Para   0    Term   0       0    AB   0    Living   0         SAB   0    IAB   0    Ectopic   0    Multiple   0    Live Births   0           Obstetric Comments   17years old (menarche)               Family History   Problem Relation Age of Onset    No Known Problems Mother     No Known Problems Father     No Known Problems Sister     No Known Problems Maternal Grandmother     No Known Problems Maternal Grandfather     Ovarian cancer Paternal Grandmother         age unknown    No Known Problems Paternal Grandfather     No Known Problems Maternal Aunt     Cancer Paternal Aunt 61    No Known Problems Paternal Aunt     No Known Problems Paternal Aunt     Colon polyps Cousin     Colon cancer Cousin 48    No Known Problems Half-Sister        The following portions of the patient's history were reviewed and updated as appropriate: allergies, current medications, past family history, past medical history, past social history, past surgical history, and problem list.      Objective:    Blood pressure 128/80, pulse 77, temperature 97.5 °F (36.4 °C), temperature source Temporal, resp. rate 16, height 5' 3" (1.6 m), weight 54.6 kg (120 lb 6.4 oz), SpO2 100 %, not currently breastfeeding. Body mass index is 21.33 kg/m². Physical Exam  Vitals reviewed. Exam conducted with a chaperone present. Constitutional:       Appearance: Normal appearance. Cardiovascular:      Rate and Rhythm: Normal rate and regular rhythm. Pulmonary:      Effort: Pulmonary effort is normal. No respiratory distress. Breath sounds: Normal breath sounds. Abdominal:      General: There is no distension. Palpations: There is no mass. Tenderness: There is no abdominal tenderness.    Genitourinary: Comments: Normal external genitalia. Vulva vagina with no lesions. Cervix status post multiple excisions, visible white tip of the IUD was grasped and pulled, main ritchie and 1 side branch were retrieved but 1 side branch was retained in the uterus. Patient tolerated well. Musculoskeletal:      Right lower leg: No edema. Left lower leg: No edema. Neurological:      Mental Status: She is alert.        Adriano Álvarez MD, [place], [facility]  10/31/2023  4:10 PM

## 2023-11-02 ENCOUNTER — TELEPHONE (OUTPATIENT)
Dept: HEMATOLOGY ONCOLOGY | Facility: CLINIC | Age: 53
End: 2023-11-02

## 2023-11-02 NOTE — TELEPHONE ENCOUNTER
Patient Call    Who are you speaking with? Patient    If it is not the patient, are they listed on an active communication consent form? N/A   What is the reason for this call? The patient would like to discuss other options for procedures, she is requesting to speak to Dr. Payton Del Rio specifically. Does this require a call back? Yes   If a call back is required, please list best call back number [phone]   If a call back is required, advise that a message will be forwarded to their care team and someone will return their call as soon as possible. Did you relay this information to the patient?  Yes

## 2023-11-02 NOTE — TELEPHONE ENCOUNTER
Return call placed. VM message left letting her know that we saw her message to speak with Dr Angi Brooks directly however he is out of the office until Next week. If she would like to speak with me I left her my extension so she may return my call. Otherwise she will have to wait for Dr Olga Galvan to reach out to her upon his return to the office.

## 2023-11-02 NOTE — TELEPHONE ENCOUNTER
Can you give her a call? She wants to schedule surgery, but is now considering hysterectomy. Maybe we can hold a spot for her 12/7/23? Thanks!

## 2023-11-06 ENCOUNTER — APPOINTMENT (OUTPATIENT)
Dept: RADIOLOGY | Facility: MEDICAL CENTER | Age: 53
End: 2023-11-06
Payer: COMMERCIAL

## 2023-11-06 DIAGNOSIS — T83.39XA RETAINED INTRAUTERINE CONTRACEPTIVE DEVICE (IUD): ICD-10-CM

## 2023-11-06 DIAGNOSIS — Z01.818 PREOP TESTING: Primary | ICD-10-CM

## 2023-11-06 PROCEDURE — 74018 RADEX ABDOMEN 1 VIEW: CPT

## 2023-11-07 ENCOUNTER — APPOINTMENT (OUTPATIENT)
Dept: LAB | Facility: MEDICAL CENTER | Age: 53
End: 2023-11-07
Payer: COMMERCIAL

## 2023-11-07 DIAGNOSIS — Z01.818 PREOP TESTING: ICD-10-CM

## 2023-11-07 DIAGNOSIS — Z01.818 PRE-OP TESTING: ICD-10-CM

## 2023-11-07 LAB
ABO GROUP BLD: NORMAL
ALBUMIN SERPL BCP-MCNC: 4.5 G/DL (ref 3.5–5)
ALP SERPL-CCNC: 54 U/L (ref 34–104)
ALT SERPL W P-5'-P-CCNC: 9 U/L (ref 7–52)
ANION GAP SERPL CALCULATED.3IONS-SCNC: 8 MMOL/L
AST SERPL W P-5'-P-CCNC: 16 U/L (ref 13–39)
BASOPHILS # BLD AUTO: 0.14 THOUSANDS/ÂΜL (ref 0–0.1)
BASOPHILS NFR BLD AUTO: 2 % (ref 0–1)
BILIRUB SERPL-MCNC: 0.93 MG/DL (ref 0.2–1)
BLD GP AB SCN SERPL QL: NEGATIVE
BUN SERPL-MCNC: 18 MG/DL (ref 5–25)
CALCIUM SERPL-MCNC: 9.4 MG/DL (ref 8.4–10.2)
CHLORIDE SERPL-SCNC: 104 MMOL/L (ref 96–108)
CO2 SERPL-SCNC: 26 MMOL/L (ref 21–32)
CREAT SERPL-MCNC: 0.85 MG/DL (ref 0.6–1.3)
EOSINOPHIL # BLD AUTO: 0.2 THOUSAND/ÂΜL (ref 0–0.61)
EOSINOPHIL NFR BLD AUTO: 3 % (ref 0–6)
ERYTHROCYTE [DISTWIDTH] IN BLOOD BY AUTOMATED COUNT: 14.6 % (ref 11.6–15.1)
EST. AVERAGE GLUCOSE BLD GHB EST-MCNC: 103 MG/DL
GFR SERPL CREATININE-BSD FRML MDRD: 79 ML/MIN/1.73SQ M
GLUCOSE P FAST SERPL-MCNC: 93 MG/DL (ref 65–99)
HBA1C MFR BLD: 5.2 %
HCT VFR BLD AUTO: 46.4 % (ref 34.8–46.1)
HGB BLD-MCNC: 15.4 G/DL (ref 11.5–15.4)
IMM GRANULOCYTES # BLD AUTO: 0.03 THOUSAND/UL (ref 0–0.2)
IMM GRANULOCYTES NFR BLD AUTO: 1 % (ref 0–2)
LYMPHOCYTES # BLD AUTO: 1.53 THOUSANDS/ÂΜL (ref 0.6–4.47)
LYMPHOCYTES NFR BLD AUTO: 25 % (ref 14–44)
MCH RBC QN AUTO: 29.5 PG (ref 26.8–34.3)
MCHC RBC AUTO-ENTMCNC: 33.2 G/DL (ref 31.4–37.4)
MCV RBC AUTO: 89 FL (ref 82–98)
MONOCYTES # BLD AUTO: 0.51 THOUSAND/ÂΜL (ref 0.17–1.22)
MONOCYTES NFR BLD AUTO: 8 % (ref 4–12)
NEUTROPHILS # BLD AUTO: 3.64 THOUSANDS/ÂΜL (ref 1.85–7.62)
NEUTS SEG NFR BLD AUTO: 61 % (ref 43–75)
NRBC BLD AUTO-RTO: 0 /100 WBCS
PLATELET # BLD AUTO: 719 THOUSANDS/UL (ref 149–390)
PMV BLD AUTO: 9.7 FL (ref 8.9–12.7)
POTASSIUM SERPL-SCNC: 4.3 MMOL/L (ref 3.5–5.3)
PROT SERPL-MCNC: 6.8 G/DL (ref 6.4–8.4)
RBC # BLD AUTO: 5.22 MILLION/UL (ref 3.81–5.12)
RH BLD: POSITIVE
SODIUM SERPL-SCNC: 138 MMOL/L (ref 135–147)
SPECIMEN EXPIRATION DATE: NORMAL
WBC # BLD AUTO: 6.05 THOUSAND/UL (ref 4.31–10.16)

## 2023-11-07 PROCEDURE — 86901 BLOOD TYPING SEROLOGIC RH(D): CPT

## 2023-11-07 PROCEDURE — 36415 COLL VENOUS BLD VENIPUNCTURE: CPT

## 2023-11-07 PROCEDURE — 86850 RBC ANTIBODY SCREEN: CPT

## 2023-11-07 PROCEDURE — 83036 HEMOGLOBIN GLYCOSYLATED A1C: CPT

## 2023-11-07 PROCEDURE — 85025 COMPLETE CBC W/AUTO DIFF WBC: CPT

## 2023-11-07 PROCEDURE — 86900 BLOOD TYPING SEROLOGIC ABO: CPT

## 2023-11-07 PROCEDURE — 80053 COMPREHEN METABOLIC PANEL: CPT

## 2023-11-14 ENCOUNTER — ANESTHESIA EVENT (OUTPATIENT)
Dept: PERIOP | Facility: HOSPITAL | Age: 53
End: 2023-11-14
Payer: COMMERCIAL

## 2023-11-14 NOTE — PRE-PROCEDURE INSTRUCTIONS
Pre-Surgery Instructions:   Medication Instructions    aspirin (ECOTRIN LOW STRENGTH) 81 mg EC tablet Stop taking 7 days prior to surgery. LORazepam (ATIVAN) 0.5 mg tablet Uses PRN- OK to take day of surgery    Medication instructions for day surgery reviewed. Please use only a sip of water to take your instructed medications. Avoid all over the counter vitamins, supplements and NSAIDS for one week prior to surgery per anesthesia guidelines. Tylenol is ok to take as needed. You will receive a call one business day prior to surgery with an arrival time and hospital directions. If your surgery is scheduled on a Monday, the hospital will be calling you on the Friday prior to your surgery. If you have not heard from anyone by 8pm, please call the hospital supervisor through the hospital  at [phone]. Bhavik Villar [phone]). Do not eat or drink anything after midnight the night before your surgery, including candy, mints, lifesavers, or chewing gum. Do not drink alcohol 24hrs before your surgery. Try not to smoke at least 24hrs before your surgery. Follow the pre surgery showering instructions as listed in the [facility] Surgical Experience Booklet” or otherwise provided by your surgeon's office. Do not use a blade to shave the surgical area 1 week before surgery. It is okay to use a clean electric clippers up to 24 hours before surgery. Do not apply any lotions, creams, including makeup, cologne, deodorant, or perfumes after showering on the day of your surgery. Do not use dry shampoo, hair spray, hair gel, or any type of hair products. No contact lenses, eye make-up, or artificial eyelashes. Remove nail polish, including gel polish, and any artificial, gel, or acrylic nails if possible. Remove all jewelry including rings and body piercing jewelry. Wear causal clothing that is easy to take on and off. Consider your type of surgery. Keep any valuables, jewelry, piercings at home.  Please bring any specially ordered equipment (sling, braces) if indicated. Arrange for a responsible person to drive you to and from the hospital on the day of your surgery. Visitor Guidelines discussed. Call the surgeon's office with any new illnesses, exposures, or additional questions prior to surgery. Please reference your [facility] Surgical Experience Booklet” for additional information to prepare for your upcoming surgery.

## 2023-11-15 ENCOUNTER — HOSPITAL ENCOUNTER (OUTPATIENT)
Facility: HOSPITAL | Age: 53
Setting detail: OUTPATIENT SURGERY
Discharge: HOME | End: 2023-11-15
Attending: OBSTETRICS & GYNECOLOGY | Admitting: OBSTETRICS & GYNECOLOGY
Payer: COMMERCIAL

## 2023-11-15 ENCOUNTER — ANESTHESIA (OUTPATIENT)
Dept: PERIOP | Facility: HOSPITAL | Age: 53
End: 2023-11-15
Payer: COMMERCIAL

## 2023-11-15 ENCOUNTER — APPOINTMENT (OUTPATIENT)
Dept: RADIOLOGY | Facility: HOSPITAL | Age: 53
End: 2023-11-15
Payer: COMMERCIAL

## 2023-11-15 VITALS
SYSTOLIC BLOOD PRESSURE: 152 MMHG | RESPIRATION RATE: 18 BRPM | HEART RATE: 77 BPM | BODY MASS INDEX: 21.29 KG/M2 | OXYGEN SATURATION: 98 % | TEMPERATURE: 97.5 F | HEIGHT: 63 IN | WEIGHT: 120.15 LBS | DIASTOLIC BLOOD PRESSURE: 82 MMHG

## 2023-11-15 DIAGNOSIS — T83.39XA RETAINED INTRAUTERINE CONTRACEPTIVE DEVICE (IUD): ICD-10-CM

## 2023-11-15 PROBLEM — F41.9 ANXIETY: Status: ACTIVE | Noted: 2023-11-15

## 2023-11-15 PROBLEM — N87.1 MODERATE DYSPLASIA OF CERVIX (CIN II): Status: RESOLVED | Noted: 2023-11-15 | Resolved: 2023-11-15

## 2023-11-15 PROBLEM — Z90.721 S/P HYSTERECTOMY WITH OOPHORECTOMY: Status: ACTIVE | Noted: 2023-11-15

## 2023-11-15 PROBLEM — Z90.710 S/P HYSTERECTOMY WITH OOPHORECTOMY: Status: ACTIVE | Noted: 2023-11-15

## 2023-11-15 LAB
EXT PREGNANCY TEST URINE: NEGATIVE
EXT. CONTROL: NORMAL

## 2023-11-15 PROCEDURE — 58571 TLH W/T/O 250 G OR LESS: CPT | Performed by: OBSTETRICS & GYNECOLOGY

## 2023-11-15 PROCEDURE — 81025 URINE PREGNANCY TEST: CPT | Performed by: ANESTHESIOLOGY

## 2023-11-15 PROCEDURE — 88309 TISSUE EXAM BY PATHOLOGIST: CPT | Performed by: PATHOLOGY

## 2023-11-15 RX ORDER — CEFAZOLIN SODIUM 1 G/50ML
1000 SOLUTION INTRAVENOUS EVERY 8 HOURS
Status: CANCELLED | OUTPATIENT
Start: 2023-11-15

## 2023-11-15 RX ORDER — CELECOXIB 200 MG/1
200 CAPSULE ORAL ONCE
Status: COMPLETED | OUTPATIENT
Start: 2023-11-15 | End: 2023-11-15

## 2023-11-15 RX ORDER — MIDAZOLAM HYDROCHLORIDE 2 MG/2ML
INJECTION, SOLUTION INTRAMUSCULAR; INTRAVENOUS AS NEEDED
Status: DISCONTINUED | OUTPATIENT
Start: 2023-11-15 | End: 2023-11-15

## 2023-11-15 RX ORDER — CEFAZOLIN SODIUM 1 G/3ML
INJECTION, POWDER, FOR SOLUTION INTRAMUSCULAR; INTRAVENOUS AS NEEDED
Status: DISCONTINUED | OUTPATIENT
Start: 2023-11-15 | End: 2023-11-15

## 2023-11-15 RX ORDER — MAGNESIUM HYDROXIDE 1200 MG/15ML
LIQUID ORAL AS NEEDED
Status: DISCONTINUED | OUTPATIENT
Start: 2023-11-15 | End: 2023-11-15 | Stop reason: HOSPADM

## 2023-11-15 RX ORDER — PROPOFOL 10 MG/ML
INJECTION, EMULSION INTRAVENOUS CONTINUOUS PRN
Status: DISCONTINUED | OUTPATIENT
Start: 2023-11-15 | End: 2023-11-15

## 2023-11-15 RX ORDER — SODIUM CHLORIDE 9 MG/ML
125 INJECTION, SOLUTION INTRAVENOUS CONTINUOUS
Status: DISCONTINUED | OUTPATIENT
Start: 2023-11-15 | End: 2023-11-15 | Stop reason: HOSPADM

## 2023-11-15 RX ORDER — BUPIVACAINE HYDROCHLORIDE 5 MG/ML
INJECTION, SOLUTION EPIDURAL; INTRACAUDAL AS NEEDED
Status: DISCONTINUED | OUTPATIENT
Start: 2023-11-15 | End: 2023-11-15 | Stop reason: HOSPADM

## 2023-11-15 RX ORDER — HYDROMORPHONE HCL/PF 1 MG/ML
0.5 SYRINGE (ML) INJECTION
Status: DISCONTINUED | OUTPATIENT
Start: 2023-11-15 | End: 2023-11-15 | Stop reason: HOSPADM

## 2023-11-15 RX ORDER — FENTANYL CITRATE/PF 50 MCG/ML
25 SYRINGE (ML) INJECTION
Status: COMPLETED | OUTPATIENT
Start: 2023-11-15 | End: 2023-11-15

## 2023-11-15 RX ORDER — ONDANSETRON 2 MG/ML
4 INJECTION INTRAMUSCULAR; INTRAVENOUS ONCE AS NEEDED
Status: DISCONTINUED | OUTPATIENT
Start: 2023-11-15 | End: 2023-11-15 | Stop reason: HOSPADM

## 2023-11-15 RX ORDER — ONDANSETRON 2 MG/ML
4 INJECTION INTRAMUSCULAR; INTRAVENOUS ONCE
Status: COMPLETED | OUTPATIENT
Start: 2023-11-15 | End: 2023-11-15

## 2023-11-15 RX ORDER — HEPARIN SODIUM 5000 [USP'U]/ML
INJECTION, SOLUTION INTRAVENOUS; SUBCUTANEOUS AS NEEDED
Status: DISCONTINUED | OUTPATIENT
Start: 2023-11-15 | End: 2023-11-15

## 2023-11-15 RX ORDER — KETOROLAC TROMETHAMINE 30 MG/ML
INJECTION, SOLUTION INTRAMUSCULAR; INTRAVENOUS AS NEEDED
Status: DISCONTINUED | OUTPATIENT
Start: 2023-11-15 | End: 2023-11-15

## 2023-11-15 RX ORDER — ONDANSETRON 2 MG/ML
INJECTION INTRAMUSCULAR; INTRAVENOUS AS NEEDED
Status: DISCONTINUED | OUTPATIENT
Start: 2023-11-15 | End: 2023-11-15

## 2023-11-15 RX ORDER — FENTANYL CITRATE 50 UG/ML
INJECTION, SOLUTION INTRAMUSCULAR; INTRAVENOUS AS NEEDED
Status: DISCONTINUED | OUTPATIENT
Start: 2023-11-15 | End: 2023-11-15

## 2023-11-15 RX ORDER — LIDOCAINE HCL/PF 100 MG/5ML
SYRINGE (ML) INJECTION AS NEEDED
Status: DISCONTINUED | OUTPATIENT
Start: 2023-11-15 | End: 2023-11-15

## 2023-11-15 RX ORDER — SCOLOPAMINE TRANSDERMAL SYSTEM 1 MG/1
1 PATCH, EXTENDED RELEASE TRANSDERMAL ONCE
Status: DISCONTINUED | OUTPATIENT
Start: 2023-11-15 | End: 2023-11-15 | Stop reason: HOSPADM

## 2023-11-15 RX ORDER — VECURONIUM BROMIDE 1 MG/ML
INJECTION, POWDER, LYOPHILIZED, FOR SOLUTION INTRAVENOUS AS NEEDED
Status: DISCONTINUED | OUTPATIENT
Start: 2023-11-15 | End: 2023-11-15

## 2023-11-15 RX ORDER — METRONIDAZOLE 500 MG/100ML
INJECTION, SOLUTION INTRAVENOUS CONTINUOUS PRN
Status: DISCONTINUED | OUTPATIENT
Start: 2023-11-15 | End: 2023-11-15

## 2023-11-15 RX ORDER — OXYCODONE HYDROCHLORIDE 5 MG/1
5 TABLET ORAL EVERY 4 HOURS PRN
Status: DISCONTINUED | OUTPATIENT
Start: 2023-11-15 | End: 2023-11-15 | Stop reason: HOSPADM

## 2023-11-15 RX ORDER — PROPOFOL 10 MG/ML
INJECTION, EMULSION INTRAVENOUS AS NEEDED
Status: DISCONTINUED | OUTPATIENT
Start: 2023-11-15 | End: 2023-11-15

## 2023-11-15 RX ADMIN — FENTANYL CITRATE 100 MCG: 50 INJECTION INTRAMUSCULAR; INTRAVENOUS at 15:44

## 2023-11-15 RX ADMIN — KETOROLAC TROMETHAMINE 30 MG: 30 INJECTION, SOLUTION INTRAMUSCULAR at 16:47

## 2023-11-15 RX ADMIN — SODIUM CHLORIDE: 0.9 INJECTION, SOLUTION INTRAVENOUS at 16:20

## 2023-11-15 RX ADMIN — MIDAZOLAM 2 MG: 1 INJECTION INTRAMUSCULAR; INTRAVENOUS at 15:40

## 2023-11-15 RX ADMIN — FENTANYL CITRATE 50 MCG: 50 INJECTION INTRAMUSCULAR; INTRAVENOUS at 16:09

## 2023-11-15 RX ADMIN — METRONIDAZOLE: 500 INJECTION, SOLUTION INTRAVENOUS at 16:09

## 2023-11-15 RX ADMIN — SUGAMMADEX 200 MG: 100 INJECTION, SOLUTION INTRAVENOUS at 16:57

## 2023-11-15 RX ADMIN — LIDOCAINE HYDROCHLORIDE 100 MG: 20 INJECTION INTRAVENOUS at 15:44

## 2023-11-15 RX ADMIN — FENTANYL CITRATE 25 MCG: 50 INJECTION INTRAMUSCULAR; INTRAVENOUS at 18:55

## 2023-11-15 RX ADMIN — CEFAZOLIN 1000 MG: 1 INJECTION, POWDER, FOR SOLUTION INTRAMUSCULAR; INTRAVENOUS at 15:51

## 2023-11-15 RX ADMIN — PROPOFOL 200 MG: 10 INJECTION, EMULSION INTRAVENOUS at 15:44

## 2023-11-15 RX ADMIN — CELECOXIB 200 MG: 200 CAPSULE ORAL at 13:23

## 2023-11-15 RX ADMIN — HYDROMORPHONE HYDROCHLORIDE 0.5 MG: 1 INJECTION, SOLUTION INTRAMUSCULAR; INTRAVENOUS; SUBCUTANEOUS at 17:55

## 2023-11-15 RX ADMIN — FENTANYL CITRATE 25 MCG: 50 INJECTION INTRAMUSCULAR; INTRAVENOUS at 18:33

## 2023-11-15 RX ADMIN — VECURONIUM BROMIDE 7 MG: 1 INJECTION, POWDER, LYOPHILIZED, FOR SOLUTION INTRAVENOUS at 15:44

## 2023-11-15 RX ADMIN — PROPOFOL 130 MCG/KG/MIN: 10 INJECTION, EMULSION INTRAVENOUS at 15:44

## 2023-11-15 RX ADMIN — FENTANYL CITRATE 50 MCG: 50 INJECTION INTRAMUSCULAR; INTRAVENOUS at 16:02

## 2023-11-15 RX ADMIN — OXYCODONE HYDROCHLORIDE 5 MG: 5 TABLET ORAL at 19:44

## 2023-11-15 RX ADMIN — ONDANSETRON 4 MG: 2 INJECTION INTRAMUSCULAR; INTRAVENOUS at 20:05

## 2023-11-15 RX ADMIN — SODIUM CHLORIDE 125 ML/HR: 0.9 INJECTION, SOLUTION INTRAVENOUS at 13:23

## 2023-11-15 RX ADMIN — HEPARIN SODIUM 5000 UNITS: 5000 INJECTION INTRAVENOUS; SUBCUTANEOUS at 16:01

## 2023-11-15 RX ADMIN — SCOPALAMINE 1 PATCH: 1 PATCH, EXTENDED RELEASE TRANSDERMAL at 13:22

## 2023-11-15 RX ADMIN — FENTANYL CITRATE 25 MCG: 50 INJECTION INTRAMUSCULAR; INTRAVENOUS at 18:47

## 2023-11-15 RX ADMIN — TRIMETHOBENZAMIDE HYDROCHLORIDE 200 MG: 100 INJECTION INTRAMUSCULAR at 21:07

## 2023-11-15 RX ADMIN — ONDANSETRON 4 MG: 2 INJECTION INTRAMUSCULAR; INTRAVENOUS at 16:44

## 2023-11-15 RX ADMIN — FENTANYL CITRATE 25 MCG: 50 INJECTION INTRAMUSCULAR; INTRAVENOUS at 18:22

## 2023-11-15 NOTE — ANESTHESIA PREPROCEDURE EVALUATION
Procedure:  LTH B/L SALPINGECTOMY, POSS B/L OOPHORECTOMY (Abdomen)    Relevant Problems   ANESTHESIA   (+) PONV (postoperative nausea and vomiting)      NEURO/PSYCH   (+) Anxiety      Genitourinary   (+) EMILI III (cervical intraepithelial neoplasia III)      Other   (+) Personal history of adenocarcinoma of breast   (+) S/P breast reconstruction   (+) Splenomegaly   (+) Status post right breast lumpectomy        Physical Exam    Airway    Mallampati score: I  TM Distance: >3 FB  Neck ROM: full     Dental   No notable dental hx     Cardiovascular  Rhythm: regular, Rate: normal, Cardiovascular exam normal    Pulmonary  Pulmonary exam normal Breath sounds clear to auscultation    Other Findings      Anesthesia Plan  ASA Score- 2     Anesthesia Type- general with ASA Monitors. Additional Monitors:     Airway Plan: ETT. Comment: SCOP patch ordered  Did well with propofol-based GA . [facility] Plan Factors-    Chart reviewed. Existing labs reviewed. Patient summary reviewed. Patient is not a current smoker. Patient not instructed to abstain from smoking on day of procedure. Patient did not smoke on day of surgery. There is medical exclusion for perioperative obstructive sleep apnea risk education. Induction- intravenous. Postoperative Plan-     Informed Consent- Anesthetic plan and risks discussed with patient Emy Oakes (BALDOMERO)).

## 2023-11-15 NOTE — DISCHARGE INSTR - AVS FIRST PAGE
Total laparoscopic hysterectomy, bilateral salpingo-oophorectomy, cystoscopy  Discharge Instructions    WHAT YOU NEED TO KNOW:   Today we removed your uterus, cervix and bilateral fallopian tubes and ovaries. At the end of the procedure we looked inside your bladder and there was no evidence of injuries or complications. Your procedures were uncomplicated. DISCHARGE INSTRUCTIONS:     Apply ice pack to abdominal incisions the first 24 to 48 hours for discomfort or pain. If your pain is severe and not controlled by the measures recommended below, contact Dr. Abdi Green directly on his cell phone at [phone]. For other concerns or questions call [phone] and you will be connected with the gynecologic oncology provider on-call. Contact your doctor at the number above if:   You have a fever over 101o. You have nausea or are vomiting that does not improve after a light meal.   Your pain is getting worse, even after you take medicine. You feel pain or burning when you urinate, or you have trouble urinating. You have pus or a foul-smelling odor coming from your vagina and/or wound. Your wound is red, swollen, or draining pus. You see new or an increased amount of bright red blood coming from your vagina or your incisions. You have questions or concerns about your condition or care. Seek care immediately:   Your arm or leg feels warm, tender, and painful. It may look swollen and red. You have increasing abdominal or pelvic pain. You have heavy vaginal bleeding that fills 1 or more sanitary pads in 1 hour. Call 911 for any of the following: You feel lightheaded, short of breath, and have chest pain. You cough up blood. Medicines: You may need any of the following:  Prescription medicine will not be given for this procedure. Resume your previous medications as directed. Extra-strength Tylenol: This medications over-the-counter.   Take 2 tablets every 6 hours as needed for mild-to-moderate pain. Ibuprofen/Advil/Motrin 200 mg tablets: This medication is over-the-counter. Take 3 tablets every 8 hours as needed for moderate to severe pain. Take this medication with food. The drink plenty of fluids while using this medication to prevent kidney injury. Metamucil or MiraLax: This product is over-the-counter. Distal 1 large tbsp in a large glass of water. Use once or twice a day for 1-2 weeks prevent and or treat constipation. Take your medicines as directed. Contact your healthcare provider if you think your medicine is not helping or if you have side effects. Tell him or her if you are allergic to any medicine. Keep a list of the medicines, vitamins, and herbs you take. Include the amounts, and when and why you take them. Bring the list or the pill bottles to follow-up visits. Carry your medicine list with you in case of an emergency. Activity:   Rest as needed. Get up and move around as directed to help prevent blood clots. Start with short walks and slowly increase the distance every day. Limit the number of times you climb stairs to 2 times each day for the first week. Plan most of your daily activities on one level of your home. Do not lift objects heavier than 10 pounds until seen in the office for your follow-up appointment. Do not strain during bowel movements. High-fiber foods and extra liquids can help you prevent constipation. Examples of high-fiber foods are fruit and bran. Prune juice and water are good liquids to drink. Do not have sex, use tampons, or douche and do not do tub baths/swimming until cleared by your physician at your postoperative appointment. You may shower as soon as the day after surgery. Do not go into pools or hot tubs until cleared by your doctor. Ask when it is safe for you to drive.  It is generally safe to drive as soon as your legs are strong, you are off pain medicines and you feel like you will have the appropriate reflexes to step on the breaks in case of an emergency. Ask when you may return to work and to other regular activities. Wound care: Care for your abdominal incisions as directed. Carefully wash around the wound with soap and water. If you have Hibiclens or medicated soap that you were instructed to use before surgery, you may use that to wash with for up to 2 days after surgery. If not, any mild non-scented, non-abrasive soap is safe. It is okay to let the soap and water run over your incision. Do not scrub your incision. Dry the area and put on new, clean bandages as directed. Change your bandages when they get wet or dirty. If you have strips of medical tape, let them fall off on their own. It may take 7 to 14 days for them to fall off. Check your incision every day for redness, swelling, or pus. Deep breathing: Take deep breaths and cough 10 times each hour. This will decrease your risk for a lung infection. Take a deep breath and hold it for as long as you can. Let the air out and then cough strongly. Deep breaths help open your airway. You may be given an incentive spirometer to help you take deep breaths. Put the plastic piece in your mouth and take a slow, deep breath, then let the air out and cough. Repeat these steps 10 times every hour. Get support: This surgery may be life-changing for you and your family. You will no longer be able to get pregnant. Sudden changes in the levels of your hormones may occur and cause mood swings and depression. You may feel angry, sad, or frightened, or cry frequently and unexpectedly. These feelings are normal. Talk to your healthcare provider about where you can get support. You can also ask if hormone replacement medicine is right for you. Follow up with your healthcare provider or gynecologist as directed: You may need to return to have stitches removed, and for other tests.  Write down your questions so you remember to ask them during your visits. © 2017 [address] [place] Information is for End User's use only and may not be sold, redistributed or otherwise used for commercial purposes. All illustrations and images included in CareNotes® are the copyrighted property of A.D.A.M., Inc. or [employer]un [employer]. The above information is an  only. It is not intended as medical advice for individual conditions or treatments. Talk to your doctor, nurse or pharmacist before following any medical regimen to see if it is safe and effective for you.

## 2023-11-15 NOTE — OP NOTE
OPERATIVE REPORT  PATIENT NAME: Amy Alston    :  1970  MRN: [id number]  Pt Location: [facility] 07    SURGERY DATE: 11/15/2023    Surgeon(s) and Role:     * Thelma Whiteside MD - Primary     * Galina Vital MD - Vesna Rider MD - Fellow    Preop Diagnosis:  Retained intrauterine contraceptive device (IUD) Doneta Negus    Post-Op Diagnosis Codes:     * Retained intrauterine contraceptive device (IUD) Doneta Negus    Procedure(s):  Bilateral - Total laparoscopic hysterectomy. bilateral salpingo-oophorectomy  Cystoscopy    Specimen(s):  ID Type Source Tests Collected by Time Destination   1 : uterus, cervix, bilateral ovaries ,bilateral fallopian tubes,and retained IUD fragment Tissue Uterus TISSUE EXAM Thelma Whiteside MD 11/15/2023 1632        Estimated Blood Loss:   Minimal    Drains:  Closed/Suction Drain RLQ Bulb 19 Fr. (Active)   Number of days: 294     Anesthesia Type:   General    Operative Indications:  Patient with history of high-grade cervical dysplasia status post multiple excisional procedures and no residual evaluable cervix also with retained fragment of IUD. After counseling, she elected to undergo definitive surgical treatment. Cons of ovarian preservation versus BSO in her menopausal state, after consideration, she elected to have her ovaries removed. Operative Findings:  #1. Approximately 6 weeks size uterus, minimal residual vaginal portio of the cervix completely excised. No gross lesions. #2.  Grossly normal bilateral fallopian tubes and ovaries. #3.  Cystoscopy demonstrated normal bladder mucosa and bilateral blood and ureteral urine jets. Complications:   None    Procedure and Technique:  The patient was taken to the operating room where general endotracheal anesthesia was induced without complications. She received antibiotic and deep vein thrombosis prophylaxis per hospital protocol.   The patient was placed in the dorsal lithotomy position in [address] and her abdomen, perineum and vagina were prepped and draped in the usual sterile fashion. Under direct visualization the uterus was sounded to approximately 6 cm, the endocervix was dilated and a JAMES uterine manipulator was secured in place with a stitch of 0 Vicryl. Panda catheter was placed and the intravaginal occluder balloon was inflated. Attention was turned to the abdomen. A 5 mm skin incision was at the base of he umbilicus. Please note that this and all other incisions were infiltrated with 0.5% bupivacaine at the beginning and completion of the procedure. Using a 5 mm Endopath Excel trocar and a 5 mm laparoscope the peritoneal cavity was entered under direct visualization. Good intraperitoneal location was confirmed and pneumoperitoneum was created to maximal pressure of 15 mmHg. The patient was placed in steep Trendelenburg and the above-mentioned findings were noted. Two additional 5 mm trocars were placed in a similar fashion in the right and left lower quadrants. The ureters were clearly identified transperitoneally on both sides. The above-mentioned findings were noted. The ovarian vessels were skeletonized cauterized and divided at the level of the pelvic brim bilaterally with the Enseal device. The round ligaments were cauterized and divided with the Enseal device. The vesicouterine peritoneal reflection was divided sharply and the bladder was mobilized anteriorly. The uterine vessels were cauterized and divided bilaterally. The cardinal ligaments were cauterized and divided bilaterally with the Enseal device down to the level of the vaginal fornices. At this point, a circumferential colpotomy was made using electrocautery. The specimen was removed through the vagina. A gloved sponge was placed in the vagina to maintain pneumoperitoneum. The vaginal cuff was closed using a running stitch of 2-0 PDO Stratafix.   Airtight closure and excellent hemostasis was obtained. Copious irrigation was used and hemostasis was confirmed at low intraperitoneal pressures. All trocars were removed and pneumoperitoneum was completely released with the assistance of Valsalva maneuvers. The skin at all port sites was closed using a soft particular stitch of 4-0 Monocryl and Exofin. Vaginal exam revealed excellent closure and hemostasis. At this point the bladder was retrograde filled with a suction  device after removal of the Panda catheter. Using the laparoscope the bladder was examined and above-mentioned findings were noted. At the completion, the bladder was partially drained. The patient tolerated the procedure well, sponge, lap, needle and instrument counts were all reported as correct ×2. At the time of this dictation the patient is awaiting extubation in the operating room. She has remained stable throughout the procedure. I was present for the entire procedure.     Patient Disposition:  PACU     Tash Vega MD, [address] [facility]  11/15/2023  4:51 PM

## 2023-11-15 NOTE — ANESTHESIA POSTPROCEDURE EVALUATION
Post-Op Assessment Note    CV Status:  Stable  Pain Score: 1    Pain management: adequate       Mental Status:  Alert and awake   Hydration Status:  Euvolemic   PONV Controlled:  Controlled   Airway Patency:  Patent     Post Op Vitals Reviewed: Yes    No anethesia notable event occurred.     Staff: Anesthesiologist               BP      Temp     Pulse     Resp      SpO2      /87   Pulse 74   Temp (!) 97 °F (36.1 °C)   Resp 12   Ht 5' 3" (1.6 m)   Wt 54.5 kg (120 lb 2.4 oz)   SpO2 99%   BMI 21.28 kg/m²

## 2023-11-15 NOTE — INTERVAL H&P NOTE
H&P reviewed. After examining the patient I find no changes in the patients condition since the H&P written. Vitals:    11/15/23 1300   BP: 127/78   Pulse: 70   Resp: 16   Temp: (!) 97.1 °F (36.2 °C)   SpO2: 99%     After reconsidering options, patient has elected to have definitive treatment by total laparoscopic hysterectomy and bilateral salpingo-oophorectomy, as well as all other indicated procedures. The patient was counseled regarding indications, risks, benefits and alternatives to surgical management. We discussed risks including but not limited to bleeding and potential need for blood transfusions, infection, pain, injury to surrounding organs such as bladder, intestines, ureters and neurovascular structures. Patient understands potential risks associated with stress of surgery and general anesthesia including but not limited to acute cardiac events, venous thromboembolism, etc.  Patient consents for blood transfusions if those are deemed necessary during the course of care. She understands risks include but are not limited to hypersensitivity reactions and infection with blood-borne pathogens. Patient verbalizes understanding, agrees and wants to proceed. Informed consent form signed. All questions answered to patient's satisfaction.       Griselda Stark MD, [place], [address] [place], [place] [address]  11/15/2023  3:05 PM

## 2023-11-20 ENCOUNTER — TELEPHONE (OUTPATIENT)
Dept: HEMATOLOGY ONCOLOGY | Facility: CLINIC | Age: 53
End: 2023-11-20

## 2023-11-20 ENCOUNTER — TELEPHONE (OUTPATIENT)
Dept: GYNECOLOGIC ONCOLOGY | Facility: CLINIC | Age: 53
End: 2023-11-20

## 2023-11-20 NOTE — TELEPHONE ENCOUNTER
Patient Call    Who are you speaking with? Patient    If it is not the patient, are they listed on an active communication consent form? N/A   What is the reason for this call? The patient would like to schedule her post op appointment. Does this require a call back? Yes   If a call back is required, please list best call back number [phone]   If a call back is required, advise that a message will be forwarded to their care team and someone will return their call as soon as possible. Did you relay this information to the patient?  Yes

## 2023-11-21 PROCEDURE — 88309 TISSUE EXAM BY PATHOLOGIST: CPT | Performed by: PATHOLOGY

## 2023-12-05 ENCOUNTER — DOCUMENTATION (OUTPATIENT)
Dept: GENETICS | Facility: CLINIC | Age: 53
End: 2023-12-05

## 2023-12-07 ENCOUNTER — TELEPHONE (OUTPATIENT)
Dept: GENETICS | Facility: CLINIC | Age: 53
End: 2023-12-07

## 2023-12-11 NOTE — PROGRESS NOTES
Pre-Test Genetic Counseling Consult Note    Patient Name: Rio Decker   /Age: 1970/53 y.o. Referring Provider: Napoleon Cadena MD, FACS    Date of Service: 2023  Genetic Counselor: Jori Thornton MS, [facility]  Interpretation Services: None  [facility]    Juliette Su was referred to the [address] [place] [address],[address] and Genetic Assessment Program due to her personal history of breast cancer . she presents today to discuss the possibility of a hereditary cancer syndrome, options for genetic testing, and implications for her and her family. Cancer History and Treatment:   Personal History:   Oncology History Overview Note   2018 - observation with 81 mg ASA daily     Personal history of adenocarcinoma of breast   2009 Initial Diagnosis    Invasive ductal carcinoma of breast, female, right ([facility])     2009 - 2009 Cancer Staged     T1 B. N0, ER/NC positive, HER-2 negative, infiltrating ductal carcinoma. Stage IA, right     2009 Surgery    Right partial mastectomy, SLNB, breast implant. Dr Arlene Gary     2009 Genetic Testing    BRCA negative      -  Radiation    WBRT. Dr Reg Irving     2009 - 2014 Hormone Therapy    Tamoxifen     Essential thrombocytosis ([facility])   2018 Initial Diagnosis    In the past, Plt count had increased and return to normal.  Pt was screened for ET with Jorge mutation and was negative. Then in 2018, platelet count elevated again and mutation testing was completed. 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 BCR-aBL was negative via FISH.         Screening Hx:   Breast:  Breast Imagin23  Breast Density: Heterogeneously dense    Colon:  Colonoscopy: none    Gynecologic:  DILAN/BSO 51    Skin:  No current screening    Other screening: none    Medical and Surgical History  Pertinent surgical history:   Past Surgical History:   Procedure Laterality Date BREAST LUMPECTOMY Right     last assessed - [id number]    BREAST LUMPECTOMY Right 05/11/2009    BREAST LUMPECTOMY Right 06/05/2009    BREAST RECONSTRUCTION Bilateral 10/2018    bilateral implants    BREAST RECONSTRUCTION Right 02/2019    right implant    CERVICAL BIOPSY N/A 1/20/2023    Procedure: COLD KNIFE CONE, ENDOMETRIAL CURETTAGE;  Surgeon: Ayesha Norman MD;  [facility]: [facility];  Service: Gynecology Oncology    CERVICAL BIOPSY  W/ LOOP ELECTRODE EXCISION      COLPOSCOPY W/ BIOPSY / CURETTAGE      Colposcopy Cervix with Biopsy(s) with Endocervical Currettage    CYSTOSCOPY N/A 1/25/2023    Procedure: Celestine Locket;  Surgeon: Dylan Davis MD;  [facility];  Service: Gynecology Oncology    CYSTOSCOPY N/A 11/15/2023    Procedure: Cystoscopy;  Surgeon: Dylan Davis MD;  [facility];  Service: Gynecology Oncology    HYSTERECTOMY Bilateral 11/15/2023    Procedure:  Total laparoscopic hysterectomy, bilateral salpingo-oophorectomy;  Surgeon: Dylan Davis MD;  [facility];  Service: Gynecology Oncology    MASTECTOMY, PARTIAL Right 05/11/2009    right partial mastectomy re-excision 6/26/09    PA LAPS ABD PRTM&OMENTUM DX W/WO SPEC BR/WA SPX N/A 1/25/2023    Procedure: LAPAROSCOPY DIAGNOSTIC, PELVIC WASHOUT AND PELVIC DRAIN PLACEMENT;  Surgeon: Dylan Davis MD;  [facility];  Service: Gynecology Oncology    SENTINEL LYMPH NODE BIOPSY Right       Pertinent medical history:  Past Medical History:   Diagnosis Date    Anxiety     Bacterial vaginosis     Breast cancer ([address])     H/O bilateral breast implants     History of infection due to human papilloma virus (HPV)     last assessed - [id number]    HPV (human papilloma virus) infection     cold knife endometrial curettage  today   1/20/2023    Hx of radiation therapy     BALDEMAR-2 gene mutation     Moderate dysplasia of cervix (EMILI II)     last assessed - [id number]    Ovarian cyst     last assessed - [id number]    Pap smear abnormality of cervix with ASCUS favoring dysplasia     last assessed - [id number]    PONV (postoperative nausea and vomiting)     Secondary amenorrhea     Thrombocytosis     Use of tamoxifen (Nolvadex)     last assessed - [id number]    Varicella          Other History:  Height:   Ht Readings from Last 1 Encounters:   11/14/23 5' 3" (1.6 m)     Weight:   Wt Readings from Last 1 Encounters:   11/15/23 54.5 kg (120 lb 2.4 oz)       Relevant Family History   Patient reports no Ashkenazi Hindu ancestry. Maternal Family History:  No reported history of cancer     Paternal Family History:   Aunt (d.55) with history of cancer NOS (dx 54)   Grandmother with history of ovarian cancer   First cousin through unaffected aunt (late 46s) with history of colon cancer (dx 48)    Please refer to the scanned pedigree in the Media Tab for a complete family history     *All history is reported as provided by the patient; records are not available for review, except where indicated. Assessment:  We discussed sporadic, familial and hereditary cancer. We also discussed the many factors that influence our risk for cancer such as age, environmental exposures, lifestyle choices and family history. We reviewed the indications suggestive of a hereditary predisposition to cancer. Genetic testing is indicated for Vernida Senters based on the following criteria: Meets NCCN V2.2024 Testing Criteria for High-Penetrance Breast Cancer Susceptibility Genes: personal history of breast cancer diagnosed under the age of 48  Meets NCCN V2.2024 Testing Criteria for Ovarian Cancer Susceptibility Genes: family history of a second-degree relative diagnosed with ovarian cancer    The risks, benefits, and limitations of genetic testing were reviewed with the patient, as well as genetic discrimination laws, and possible test results (positive, negative, variants of uncertain significance) and their clinical implications.  If positive for a mutation, options for managing cancer risk including increased surveillance, chemoprevention, and in some cases prophylactic surgery were discussed. Jian Coy was informed that if a hereditary cancer syndrome was identified in her, first degree relatives (parents, siblings, and children) have a chance of also inheriting the condition. Genetic testing would allow for predictive genetic testing in other relatives, who may also be at risk depending on their degree of relation. Billing:  Most individuals pay <$100 for hereditary cancer genetic testing. If insurance covers the cost of the testing, individuals may still pay out of pocket secondary to co-pays, co-insurance, or deductibles. If the cost of the testing exceeds $100, the lab will reach out to the patient via phone or e-mail. The patient will then have the option to proceed with the testing, cancel the testing, or elect the self-pay option of $250. Jian Coy verbalized understanding. Plan: Patient decided to proceed with testing and provided consent.     Summary:     Sample Collection:  The patient's blood sample was drawn in the office on 12/12 by medical assistant Magdalena Yancey    Genetic Testing Preformed: [employer]: Cancer® +RNAinsight® (61 genes): APC, LAURA, AXIN2, BAP1, BARD1, BMPR1A, BRCA1, BRCA2, BRIP1, CDH1, CDK4, CDKN1B, CDKN2A, CHEK2, CTNNA1, DICER1, EGLN1, EPCAM, FH, FLCN, GREM1, HOXB13, KIF1B, KIT, MAX, MEN1, MET, MITF, MLH1, MSH2, MSH3, MSH6, MUTYH, NBN, NF1, NTHL1, PALB2, PMS2, POLD1, POLE, POT1, PTEN, RAD50, RAD51C, RAD51D, RB1, RECQL, RET, SDHA, SDHAF2, SDHB, SDHC, SDHD, SMAD4, SMARCA4, STK11, LUNJ739, TP53, TSC1, TSC2, VHL     Result Call Information:  In the event that we need to reach Jian Coy via telephone:  I confirmed the patient's mobile number on file as the best number to call with results  I confirmed with the patient that we can leave a voicemail on the provided numbers    Results take approximately 2-3 weeks to complete once test is weston Alexandra will be notified via Bardol[facility] Grillehart once results are available. Additional recommendations for surveillance/medical management will be made pending genetic test results.

## 2023-12-12 ENCOUNTER — CLINICAL SUPPORT (OUTPATIENT)
Dept: GENETICS | Facility: CLINIC | Age: 53
End: 2023-12-12
Payer: COMMERCIAL

## 2023-12-12 ENCOUNTER — OFFICE VISIT (OUTPATIENT)
Dept: GYNECOLOGIC ONCOLOGY | Facility: CLINIC | Age: 53
End: 2023-12-12

## 2023-12-12 ENCOUNTER — DOCUMENTATION (OUTPATIENT)
Dept: GENETICS | Facility: CLINIC | Age: 53
End: 2023-12-12

## 2023-12-12 VITALS
HEART RATE: 77 BPM | HEIGHT: 63 IN | OXYGEN SATURATION: 99 % | WEIGHT: 119.6 LBS | BODY MASS INDEX: 21.19 KG/M2 | DIASTOLIC BLOOD PRESSURE: 84 MMHG | TEMPERATURE: 97.4 F | SYSTOLIC BLOOD PRESSURE: 130 MMHG | RESPIRATION RATE: 16 BRPM

## 2023-12-12 DIAGNOSIS — Z90.710 S/P HYSTERECTOMY WITH OOPHORECTOMY: ICD-10-CM

## 2023-12-12 DIAGNOSIS — Z80.0 FAMILY HISTORY OF COLON CANCER: ICD-10-CM

## 2023-12-12 DIAGNOSIS — Z85.3 PERSONAL HISTORY OF ADENOCARCINOMA OF BREAST: Primary | ICD-10-CM

## 2023-12-12 DIAGNOSIS — D06.9 CIN III (CERVICAL INTRAEPITHELIAL NEOPLASIA III): Primary | ICD-10-CM

## 2023-12-12 DIAGNOSIS — Z80.41 FAMILY HISTORY OF OVARIAN CANCER: ICD-10-CM

## 2023-12-12 DIAGNOSIS — Z90.721 S/P HYSTERECTOMY WITH OOPHORECTOMY: ICD-10-CM

## 2023-12-12 PROBLEM — Z98.890 S/P CONE BIOPSY OF CERVIX: Status: RESOLVED | Noted: 2023-01-24 | Resolved: 2023-12-12

## 2023-12-12 PROCEDURE — 36415 COLL VENOUS BLD VENIPUNCTURE: CPT

## 2023-12-12 PROCEDURE — 99024 POSTOP FOLLOW-UP VISIT: CPT | Performed by: OBSTETRICS & GYNECOLOGY

## 2023-12-12 NOTE — ASSESSMENT & PLAN NOTE
Patient has recovered well from surgery. She denies significant menopausal symptoms. Minimal spotting related to vaginal cuff healing. I recommended to continue with strict pelvic rest for 3 additional weeks. She will then resume all activities including sexual intercourse without restrictions. She will contact me if she has any bleeding, pain or discomfort at that time.

## 2023-12-12 NOTE — LETTER
2023     Lydia Spears MD  [address]    Patient: Justin Burns  YOB: 1970  Date of Visit: 2023      Dear Dr. Evelia Mar: Thank you for referring Justin Bunrs to me for evaluation. Below are my notes for this consultation. If you have questions, please do not hesitate to call me. I look forward to following your patient along with you. Sincerely,        Jhonny Bonds        CC: Castillo Hammond MD        Pre-Test Genetic Counseling Consult Note    Patient Name: Justin Burns   /Age: 1970/53 y.o. Referring Provider: Lydia Spears MD, [facility]    Date of Service: 2023  Genetic Counselor: Jhonny Bonds MS, [facility] [place]pretation Services: None  [facility]    Manny Bueno was referred to the [address] [place] [address],[address] and Genetic Assessment Program due to her personal history of breast cancer . she presents today to discuss the possibility of a hereditary cancer syndrome, options for genetic testing, and implications for her and her family. Cancer History and Treatment:   Personal History:   Oncology History Overview Note   2018 - observation with 81 mg ASA daily     Personal history of adenocarcinoma of breast   2009 Initial Diagnosis    Invasive ductal carcinoma of breast, female, right ([address] [facility])     2009 - 2009 Cancer Staged     T1 B. N0, ER/MD positive, HER-2 negative, infiltrating ductal carcinoma. Stage IA, right     2009 Surgery    Right partial mastectomy, SLNB, breast implant. Dr Angelina Kennedy     2009 Genetic Testing    BRCA negative      -  Radiation    WBRT. Dr Stacy Rangel     2009 - 2014 Hormone Therapy    Tamoxifen     Essential thrombocytosis ([address] [facility])   2018 Initial Diagnosis    In the past, Plt count had increased and return to normal.  Pt was screened for ET with Jorge mutation and was negative.       Then in 2018, platelet count elevated again and mutation testing was completed. 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 BCR-aBL was negative via FISH. Screening Hx:   Breast:  Breast Imagin23  Breast Density: Heterogeneously dense    Colon:  Colonoscopy: none    Gynecologic:  DILAN/BSO 51    Skin:  No current screening    Other screening: none    Medical and Surgical History  Pertinent surgical history:   Past Surgical History:   Procedure Laterality Date   • BREAST LUMPECTOMY Right     last assessed - [id number]   • BREAST LUMPECTOMY Right 2009   • BREAST LUMPECTOMY Right 2009   • BREAST RECONSTRUCTION Bilateral 10/2018    bilateral implants   • BREAST RECONSTRUCTION Right 2019    right implant   • CERVICAL BIOPSY N/A 2023    Procedure: COLD KNIFE CONE, ENDOMETRIAL CURETTAGE;  Surgeon: Arpit Almonte MD;  [facility];  Service: Gynecology Oncology   • CERVICAL BIOPSY  W/ LOOP ELECTRODE EXCISION     • COLPOSCOPY W/ BIOPSY / CURETTAGE      Colposcopy Cervix with Biopsy(s) with Endocervical Currettage   • CYSTOSCOPY N/A 2023    Procedure: Bassam Hughes;  Surgeon: Lady Galicia MD;  [facility];  Service: Gynecology Oncology   • CYSTOSCOPY N/A 11/15/2023    Procedure: Cystoscopy;  Surgeon: Lady Galicia MD;  [facility];  Service: Gynecology Oncology   • HYSTERECTOMY Bilateral 11/15/2023    Procedure:  Total laparoscopic hysterectomy, bilateral salpingo-oophorectomy;  Surgeon: Lady Galicia MD;  [facility];  Service: Gynecology Oncology   • MASTECTOMY, PARTIAL Right 2009    right partial mastectomy re-excision 09   • MI LAPS ABD PRTM&OMENTUM DX W/WO SPEC BR/WA SPX N/A 2023    Procedure: LAPAROSCOPY DIAGNOSTIC, PELVIC WASHOUT AND PELVIC DRAIN PLACEMENT;  Surgeon: Lady Galicia MD;  [facility];  Service: Gynecology Oncology   • SENTINEL LYMPH NODE BIOPSY Right       Pertinent medical history:  Past Medical History:   Diagnosis Date   • Anxiety    • Bacterial vaginosis    • Breast cancer ([address])    • H/O bilateral breast implants    • History of infection due to human papilloma virus (HPV)     last assessed - [id number]   • HPV (human papilloma virus) infection     cold knife endometrial curettage  today   1/20/2023   • Hx of radiation therapy    • BALDEMAR-2 gene mutation    • Moderate dysplasia of cervix (EMILI II)     last assessed - [id number]   • Ovarian cyst     last assessed - [id number]   • Pap smear abnormality of cervix with ASCUS favoring dysplasia     last assessed - [id number]   • PONV (postoperative nausea and vomiting)    • Secondary amenorrhea    • Thrombocytosis    • Use of tamoxifen (Nolvadex)     last assessed - [id number]   • Varicella          Other History:  Height:   Ht Readings from Last 1 Encounters:   11/14/23 5' 3" (1.6 m)     Weight:   Wt Readings from Last 1 Encounters:   11/15/23 54.5 kg (120 lb 2.4 oz)       Relevant Family History   Patient reports no Ashkenazi Sikhism ancestry. Maternal Family History:  No reported history of cancer     Paternal Family History:   Aunt (d.55) with history of cancer NOS (dx 54)   Grandmother with history of ovarian cancer   First cousin through unaffected aunt (late 46s) with history of colon cancer (dx 48)    Please refer to the scanned pedigree in the Media Tab for a complete family history     *All history is reported as provided by the patient; records are not available for review, except where indicated. Assessment:  We discussed sporadic, familial and hereditary cancer. We also discussed the many factors that influence our risk for cancer such as age, environmental exposures, lifestyle choices and family history. We reviewed the indications suggestive of a hereditary predisposition to cancer.     Genetic testing is indicated for Julian Rizvi based on the following criteria: Meets NCCN V2.2024 Testing Criteria for High-Penetrance Breast Cancer Susceptibility Genes: personal history of breast cancer diagnosed under the age of 48  Meets NCCN V2.2024 Testing Criteria for Ovarian Cancer Susceptibility Genes: family history of a second-degree relative diagnosed with ovarian cancer    The risks, benefits, and limitations of genetic testing were reviewed with the patient, as well as genetic discrimination laws, and possible test results (positive, negative, variants of uncertain significance) and their clinical implications. If positive for a mutation, options for managing cancer risk including increased surveillance, chemoprevention, and in some cases prophylactic surgery were discussed. Gaetano Gray was informed that if a hereditary cancer syndrome was identified in her, first degree relatives (parents, siblings, and children) have a chance of also inheriting the condition. Genetic testing would allow for predictive genetic testing in other relatives, who may also be at risk depending on their degree of relation. Billing:  Most individuals pay <$100 for hereditary cancer genetic testing. If insurance covers the cost of the testing, individuals may still pay out of pocket secondary to co-pays, co-insurance, or deductibles. If the cost of the testing exceeds $100, the lab will reach out to the patient via phone or e-mail. The patient will then have the option to proceed with the testing, cancel the testing, or elect the self-pay option of $250. Gaetano Gray verbalized understanding. Plan: Patient decided to proceed with testing and provided consent.     Summary:     Sample Collection:  The patient's blood sample was drawn in the office on 12/12 by medical assistant Jorge Martin    Genetic Testing Preformed: [employer]: Cancer® +RNAinsight® (61 genes): APC, LAURA, AXIN2, BAP1, BARD1, BMPR1A, BRCA1, BRCA2, BRIP1, CDH1, CDK4, CDKN1B, CDKN2A, CHEK2, CTNNA1, DICER1, EGLN1, EPCAM, FH, FLCN, GREM1, HOXB13, KIF1B, KIT, MAX, MEN1, MET, MITF, MLH1, MSH2, MSH3, MSH6, MUTYH, NBN, NF1, NTHL1, PALB2, PMS2, POLD1, POLE, POT1, PTEN, RAD50, RAD51C, RAD51D, RB1, RECQL, RET, SDHA, SDHAF2, SDHB, SDHC, SDHD, SMAD4, SMARCA4, STK11, KXQB576, TP53, TSC1, TSC2, VHL     Result Call Information:  In the event that we need to reach Ravindra via telephone:  I confirmed the patient's mobile number on file as the best number to call with results  I confirmed with the patient that we can leave a voicemail on the provided numbers    Results take approximately 2-3 weeks to complete once test is started. Ravindra will be notified via [employer] once results are available. Additional recommendations for surveillance/medical management will be made pending genetic test results.

## 2023-12-12 NOTE — ASSESSMENT & PLAN NOTE
Now status post hysterectomy for retained IUD/embedded deeply in the myometrium. Final surgical pathology specimen demonstrated no residual cervical dysplasia. I discussed results with patient. She needs psychologic assessment (Pap only, no need for HPV) of the vagina yearly. These can be performed by her primary gynecologist Dr. Jose Carlos Gonzalez. I will remain available if there are any questions or concerns.

## 2023-12-12 NOTE — LETTER
December 12, 2023     Dionne Marquez, [address]    Patient: Jayla Uribe   YOB: 1970   Date of Visit: 12/12/2023       Dear Dr. Nicole Potter: Thank you for referring Jayla Uribe to me for evaluation. Below are my notes for this consultation. If you have questions, please do not hesitate to call me. I look forward to following your patient along with you. Sincerely,        Dylan Davis MD        CC: No Recipients    Dylan Davis MD  12/12/2023 10:02 AM  Incomplete  Assessment/Plan:    Problem List Items Addressed This Visit          Genitourinary    EMILI III (cervical intraepithelial neoplasia III) - Primary     Now status post hysterectomy for retained IUD/embedded deeply in the myometrium. Final surgical pathology specimen demonstrated no residual cervical dysplasia. I discussed results with patient. She needs psychologic assessment (Pap only, no need for HPV) of the vagina yearly. These can be performed by her primary gynecologist Dr. Nicole Potter. I will remain available if there are any questions or concerns. Other    S/P hysterectomy with oophorectomy     Patient has recovered well from surgery. She denies significant menopausal symptoms. Minimal spotting related to vaginal cuff healing. I recommended to continue with strict pelvic rest for 3 additional weeks. She will then resume all activities including sexual intercourse without restrictions. She will contact me if she has any bleeding, pain or discomfort at that time.               CHIEF COMPLAINT:   Postoperative follow-up    Problem:  Cancer Staging   Personal history of adenocarcinoma of breast  Staging form: Breast, AJCC 7th Edition  - Pathologic: Stage IA (T1b, N0, cM0) - Signed by Syed Paul MD on 5/21/2018        Previous therapy:  Oncology History Overview Note   9/2018 - observation with 81 mg ASA daily     Personal history of adenocarcinoma of breast 4/2009 Initial Diagnosis    Invasive ductal carcinoma of breast, female, right ([address])     4/2009 - 5/2009 Cancer Staged     T1 B. N0, ER/MT positive, HER-2 negative, infiltrating ductal carcinoma. Stage IA, right     5/11/2009 Surgery    Right partial mastectomy, SLNB, breast implant. Dr Bhavna Jaquez     5/2009 Genetic Testing    BRCA negative     2009 - 2009 Radiation    WBRT. Dr Aicha Berry     6/2009 - 6/2014 Hormone Therapy    Tamoxifen     Essential thrombocytosis ([address])   6/13/2018 Initial Diagnosis    In the past, Plt count had increased and return to normal.  Pt was screened for ET with Jorge mutation and was negative. Then in June 2018, platelet count elevated again and mutation testing was completed. 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 BCR-aBL was negative via FISH. Patient ID: Nuvia Kennedy is a 48 y.o. female  HPI  Patient with remote breast cancer and now status post excisional procedure for high-grade dysplasia who was undergoing surveillance but had retained/fractured IUD. She opted for definitive hysterectomy. She is now approaching 4 weeks out from laparoscopic hysterectomy and bilateral salpingo-oophorectomy. She has recovered well. Reports minimal blood-tinged paper after urination but no bleeding. No pain. Normal bowel and bladder function. No other complaints. Pathology reviewed. The following portions of the patient's history were reviewed and updated as appropriate: allergies, current medications, past family history, past medical history, past social history, past surgical history, and problem list.    Review of Systems  Per HPI.   Current Outpatient Medications   Medication Sig Dispense Refill   • aspirin (ECOTRIN LOW STRENGTH) 81 mg EC tablet Take 81 mg by mouth daily     • intrauterine copper (PARAGARD) IUD 1 each by Intrauterine route once     • LORazepam (ATIVAN) 0.5 mg tablet Take 1 tablet (0.5 mg total) by mouth daily as needed for anxiety 20 tablet 0     No current facility-administered medications for this visit. Objective:    Blood pressure 130/84, pulse 77, temperature (!) 97.4 °F (36.3 °C), temperature source Temporal, resp. rate 16, height 5' 3" (1.6 m), weight 54.3 kg (119 lb 9.6 oz), SpO2 99 %, not currently breastfeeding. Body mass index is 21.19 kg/m². Body surface area is 1.55 meters squared. Physical Exam  Abdomen: Incisions well-healed. No hernias. Pelvic: Normal external genitalia. Vulva vagina with no lesions. Vaginal cuff well-healed. No granulation tissue. Scant bloody discharge noted. Bimanual with no evidence of dehiscence or fluid collection as above vaginal cuff.     Kika Rodrigues MD, [place], [address] [place], [place] [address]  12/12/2023  10:02 AM

## 2023-12-12 NOTE — PROGRESS NOTES
Assessment/Plan:    Problem List Items Addressed This Visit          Genitourinary    EMILI III (cervical intraepithelial neoplasia III) - Primary     Now status post hysterectomy for retained IUD/embedded deeply in the myometrium. Final surgical pathology specimen demonstrated no residual cervical dysplasia. I discussed results with patient. She needs psychologic assessment (Pap only, no need for HPV) of the vagina yearly. These can be performed by her primary gynecologist Dr. Torri Morel. I will remain available if there are any questions or concerns. Other    S/P hysterectomy with oophorectomy     Patient has recovered well from surgery. She denies significant menopausal symptoms. Minimal spotting related to vaginal cuff healing. I recommended to continue with strict pelvic rest for 3 additional weeks. She will then resume all activities including sexual intercourse without restrictions. She will contact me if she has any bleeding, pain or discomfort at that time. CHIEF COMPLAINT:   Postoperative follow-up    Problem:  Cancer Staging   Personal history of adenocarcinoma of breast  Staging form: Breast, AJCC 7th Edition  - Pathologic: Stage IA (T1b, N0, cM0) - Signed by Tammy Riggs MD on 5/21/2018        Previous therapy:  Oncology History Overview Note   9/2018 - observation with 81 mg ASA daily     Personal history of adenocarcinoma of breast   4/2009 Initial Diagnosis    Invasive ductal carcinoma of breast, female, right ([address])     4/2009 - 5/2009 Cancer Staged     T1 B. N0, ER/AL positive, HER-2 negative, infiltrating ductal carcinoma. Stage IA, right     5/11/2009 Surgery    Right partial mastectomy, SLNB, breast implant. Dr Corky Bush     5/2009 Genetic Testing    BRCA negative     2009 - 2009 Radiation    WBRT.   Dr Ramona Brown     6/2009 - 6/2014 Hormone Therapy    Tamoxifen     Essential thrombocytosis ([address])   6/13/2018 Initial Diagnosis    In the past, Plt count had increased and return to normal.  Pt was screened for ET with Jorge mutation and was negative. Then in June 2018, platelet count elevated again and mutation testing was completed. 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 BCR-aBL was negative via FISH. Patient ID: Jonh Coffey is a 48 y.o. female  HPI  Patient with remote breast cancer and now status post excisional procedure for high-grade dysplasia who was undergoing surveillance but had retained/fractured IUD. She opted for definitive hysterectomy. She is now approaching 4 weeks out from laparoscopic hysterectomy and bilateral salpingo-oophorectomy. She has recovered well. Reports minimal blood-tinged paper after urination but no bleeding. No pain. Normal bowel and bladder function. No other complaints. Pathology reviewed. The following portions of the patient's history were reviewed and updated as appropriate: allergies, current medications, past family history, past medical history, past social history, past surgical history, and problem list.    Review of Systems  Per [facility]. Current Outpatient Medications   Medication Sig Dispense Refill    aspirin (ECOTRIN LOW STRENGTH) 81 mg EC tablet Take 81 mg by mouth daily      intrauterine copper (PARAGARD) IUD 1 each by Intrauterine route once      LORazepam (ATIVAN) 0.5 mg tablet Take 1 tablet (0.5 mg total) by mouth daily as needed for anxiety 20 tablet 0     No current facility-administered medications for this visit. Objective:    Blood pressure 130/84, pulse 77, temperature (!) 97.4 °F (36.3 °C), temperature source Temporal, resp. rate 16, height 5' 3" (1.6 m), weight 54.3 kg (119 lb 9.6 oz), SpO2 99 %, not currently breastfeeding. Body mass index is 21.19 kg/m². Body surface area is 1.55 meters squared. Physical Exam  Abdomen: Incisions well-healed. No hernias. Pelvic: Normal external genitalia. Vulva vagina with no lesions.   Vaginal cuff well-healed. No granulation tissue. Scant bloody discharge noted. Bimanual with no evidence of dehiscence or fluid collection as above vaginal cuff.     Griselda Stark MD, [place], [address] [place], [place] [address]  12/12/2023  10:02 AM

## 2024-03-04 NOTE — H&P
H&P Exam - Gynecology Oncology  Daniel Vera 50 y o  female MRN: [id number]  Unit/Bed#: OR [place] Encounter: [id number]        Assessment/Plan    54yo with h/o stage IB breast cancer now with abnormal colposcopy presents for surgical intervention  ECC +CIN2-3  Previously discussed risks and benefits of proceeding with surgical intervention  Postop expectations and recovery discussed       History of Present Illness     HPI:  Daniel Vera is a 50 y o  female who presents for surgical intervention  She has no new complaints  Pt denies abdominal bloating/pain/cramping  No vaginal bleeding/discharge  No changes in BM/urination  Oncology History Overview Note   9/2018 - observation with 81 mg ASA daily     Personal history of adenocarcinoma of breast   4/2009 Initial Diagnosis    Invasive ductal carcinoma of breast, female, right ([facility] [address] )     4/2009 - 5/2009 Cancer Staged     T1 B  N0, ER/CA positive, HER-2 negative, infiltrating ductal carcinoma  Stage IA, right     5/11/2009 Surgery    Right partial mastectomy, SLNB, breast implant  Dr Michelle Ivory     5/2009 Genetic Testing    BRCA negative     2009 - 2009 Radiation    WBRT  Dr Merry Da Silva     6/2009 - 6/2014 Hormone Therapy    Tamoxifen     [facility]   6/13/2018 Initial Diagnosis    In the past, Plt count had increased and return to normal   Pt was screened for ET with Jorge mutation and was negative  Then in June 2018, platelet count elevated again and mutation testing was completed  8/23/2018 Genomic Testing    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  BCR-aBL was negative via FISH  Review of Systems   Constitutional: Negative for appetite change, chills, fatigue and fever  Respiratory: Negative for chest tightness and shortness of breath  Gastrointestinal: Negative for abdominal distention, abdominal pain, constipation, diarrhea and nausea     Genitourinary: Negative for difficulty urinating, flank pain, frequency, urgency, vaginal bleeding, vaginal discharge and vaginal pain  Musculoskeletal: Negative for back pain, joint swelling and myalgias  Skin: Negative for rash  Neurological: Negative for dizziness, light-headedness, numbness and headaches         Historical Information   Past Medical History:   Diagnosis Date   • Anxiety    • Bacterial vaginosis    • Cancer [facility])     right breast-    3 lumpectomies/reconstruction   • H/O bilateral breast implants    • History of infection due to human papilloma virus (HPV)     last assessed - [id number]   • HPV (human papilloma virus) infection     cold knife endometrial curettage  today   2023   • Hx of radiation therapy    • BALDEMAR-2 gene mutation    • Moderate dysplasia of cervix (EMILI II)     last assessed - [id number]   • Ovarian cyst     last assessed - [id number]   • Pap smear abnormality of cervix with ASCUS favoring dysplasia     last assessed - [id number]   • PONV (postoperative nausea and vomiting)    • Secondary amenorrhea    • Thrombocytosis    • Use of tamoxifen (Nolvadex)     last assessed - [id number]   • Varicella      Past Surgical History:   Procedure Laterality Date   • BREAST LUMPECTOMY Right     last assessed - [id number]   • BREAST LUMPECTOMY Right 2009   • BREAST LUMPECTOMY Right 2009   • BREAST RECONSTRUCTION Bilateral 10/2018    bilateral implants   • BREAST RECONSTRUCTION Right 2019    right implant   • CERVICAL BIOPSY  W/ LOOP ELECTRODE EXCISION     • COLPOSCOPY W/ BIOPSY / CURETTAGE      Colposcopy Cervix with Biopsy(s) with Endocervical Currettage   • MASTECTOMY, PARTIAL Right 2009    right partial mastectomy re-excision 09   • SENTINEL LYMPH NODE BIOPSY Right      OB History    Para Term  AB Living   0 0 0 0 0 0   SAB IAB Ectopic Multiple Live Births   0 0 0 0 0   Obstetric Comments   17years old (menarche)     Family History   Problem Relation Age of Onset   • No Known Problems Mother    • No Known Problems Father    • No Known Problems Sister    • No Known Problems Maternal Grandmother    • No Known Problems Maternal Grandfather    • Ovarian cancer Paternal Grandmother         age unknown   • No Known Problems Paternal Grandfather    • No Known Problems Maternal Aunt    • Cancer Paternal Aunt 61   • No Known Problems Paternal Aunt    • No Known Problems Paternal Aunt    • Colon polyps Cousin    • Colon cancer Cousin 48   • No Known Problems Half-Sister      Social History   Social History     Substance and Sexual Activity   Alcohol Use Yes   • Alcohol/week: 2 0 standard drinks   • Types: 2 Cans of beer per week    Comment: 4  x   weekly     Social History     Substance and Sexual Activity   Drug Use No     Social History     Tobacco Use   Smoking Status Former   • Packs/day: 0 25   • Years: 26 00   • Pack years: 6 50   • Types: Cigarettes   • Quit date:    • Years since quittin 0   Smokeless Tobacco Never   Tobacco Comments    former social smoker       Meds/Allergies   Medications Prior to Admission   Medication   • aspirin (ECOTRIN LOW STRENGTH) 81 mg EC tablet   • intrauterine copper (PARAGARD) IUD   • LORazepam (ATIVAN) 0 5 mg tablet     Allergies   Allergen Reactions   • Cephalexin Rash       Objective     /76   Pulse 61   Temp (!) 97 4 °F (36 3 °C) (Temporal)   Resp 16   Ht 5' 3" (1 6 m)   Wt 52 1 kg (114 lb 13 8 oz)   LMP 2021 (Approximate)   SpO2 100%   BMI 20 35 kg/m²     No intake/output data recorded  No intake/output data recorded  Lab Results   Component Value Date    WBC 6 8 2018    HGB 13 8 2018    HCT 42 6 2018    MCV 88 0 2018     (H) 2018       Lab Results   Component Value Date    GLUCOSE 76 2015    CALCIUM 9 3 2016     2016    K 4 1 2016    CO2 22 2016     2016    BUN 22 2016    CREATININE 0 90 2016       Physical Exam  HENT:      Head: Normocephalic and atraumatic  Nose: Nose normal    Cardiovascular:      Rate and Rhythm: Normal rate and regular rhythm  Pulmonary:      Effort: Pulmonary effort is normal    Abdominal:      General: There is no distension  Palpations: Abdomen is soft  There is no mass  Genitourinary:     Comments: defer  Musculoskeletal:         General: No swelling  Normal range of motion  Cervical back: Normal range of motion  Skin:     General: Skin is warm and dry  Neurological:      General: No focal deficit present  Mental Status: She is alert  Psychiatric:         Mood and Affect: Mood normal          Imaging: I have personally reviewed pertinent reports  EKG, Pathology, and Other Studies: I have personally reviewed pertinent reports          Code Status: No Order    Wing Bhakta MD  1/20/2023  10:30 AM 4 = No assist / stand by assistance

## 2024-03-07 ENCOUNTER — RA CDI HCC (OUTPATIENT)
Dept: OTHER | Facility: HOSPITAL | Age: 54
End: 2024-03-07

## 2024-03-07 NOTE — PROGRESS NOTES
HCC coding opportunities       Chart reviewed, no opportunity found: CHART REVIEWED, NO OPPORTUNITY FOUND      This is a reminder to address (resolve/update/assess) ALL HCC (risk adjustment) codes as found on active problem list for 2024 as patient scores reset to zero KAMERON.  Also, just a reminder to please review and assess all other chronic conditions for 2024  Patients Insurance        Commercial Insurance: [employer] Commercial [employer]

## 2024-03-13 ENCOUNTER — TELEPHONE (OUTPATIENT)
Dept: HEMATOLOGY ONCOLOGY | Facility: CLINIC | Age: 54
End: 2024-03-13

## 2024-03-13 NOTE — TELEPHONE ENCOUNTER
Patient Call    Who are you speaking with? Patient    If it is not the patient, are they listed on an active communication consent form? N/A   What is the reason for this call? Pt is going out of town so has to cancel 5/29 with Dr. Clifton. She asked if Dr. Clifton can call her back to review mammo   Does this require a call back? Yes   If a call back is required, please list [facility] call back number [phone]    If a call back is required, advise that a message will be forwarded to their care team and someone will return their call as soon as possible.   Did you relay this information to the patient? Yes

## 2024-03-14 ENCOUNTER — TELEPHONE (OUTPATIENT)
Dept: HEMATOLOGY ONCOLOGY | Facility: CLINIC | Age: 54
End: 2024-03-14

## 2024-03-14 ENCOUNTER — APPOINTMENT (OUTPATIENT)
Dept: LAB | Facility: MEDICAL CENTER | Age: 54
End: 2024-03-14
Payer: COMMERCIAL

## 2024-03-14 ENCOUNTER — OFFICE VISIT (OUTPATIENT)
Dept: FAMILY MEDICINE CLINIC | Facility: CLINIC | Age: 54
End: 2024-03-14
Payer: COMMERCIAL

## 2024-03-14 VITALS
HEIGHT: 63 IN | OXYGEN SATURATION: 98 % | HEART RATE: 62 BPM | BODY MASS INDEX: 21.48 KG/M2 | SYSTOLIC BLOOD PRESSURE: 128 MMHG | WEIGHT: 121.2 LBS | RESPIRATION RATE: 16 BRPM | DIASTOLIC BLOOD PRESSURE: 82 MMHG | TEMPERATURE: 97.8 F

## 2024-03-14 DIAGNOSIS — D47.3 ESSENTIAL THROMBOCYTOSIS (HCC): ICD-10-CM

## 2024-03-14 DIAGNOSIS — Z17.0 MALIGNANT NEOPLASM OF OVERLAPPING SITES OF BREAST IN FEMALE, ESTROGEN RECEPTOR POSITIVE, UNSPECIFIED LATERALITY (HCC): ICD-10-CM

## 2024-03-14 DIAGNOSIS — R16.1 SPLENOMEGALY: ICD-10-CM

## 2024-03-14 DIAGNOSIS — Z12.11 COLON CANCER SCREENING: Primary | ICD-10-CM

## 2024-03-14 DIAGNOSIS — C50.819 MALIGNANT NEOPLASM OF OVERLAPPING SITES OF BREAST IN FEMALE, ESTROGEN RECEPTOR POSITIVE, UNSPECIFIED LATERALITY (HCC): ICD-10-CM

## 2024-03-14 DIAGNOSIS — Z00.00 ANNUAL PHYSICAL EXAM: ICD-10-CM

## 2024-03-14 LAB
ALBUMIN SERPL BCP-MCNC: 4.6 G/DL (ref 3.5–5)
ALP SERPL-CCNC: 65 U/L (ref 34–104)
ALT SERPL W P-5'-P-CCNC: 12 U/L (ref 7–52)
ANION GAP SERPL CALCULATED.3IONS-SCNC: 9 MMOL/L (ref 4–13)
AST SERPL W P-5'-P-CCNC: 22 U/L (ref 13–39)
BASOPHILS # BLD AUTO: 0.11 THOUSANDS/ÂΜL (ref 0–0.1)
BASOPHILS NFR BLD AUTO: 2 % (ref 0–1)
BILIRUB SERPL-MCNC: 0.66 MG/DL (ref 0.2–1)
BUN SERPL-MCNC: 22 MG/DL (ref 5–25)
CALCIUM SERPL-MCNC: 9.7 MG/DL (ref 8.4–10.2)
CHLORIDE SERPL-SCNC: 104 MMOL/L (ref 96–108)
CO2 SERPL-SCNC: 26 MMOL/L (ref 21–32)
CREAT SERPL-MCNC: 0.92 MG/DL (ref 0.6–1.3)
EOSINOPHIL # BLD AUTO: 0.23 THOUSAND/ÂΜL (ref 0–0.61)
EOSINOPHIL NFR BLD AUTO: 4 % (ref 0–6)
ERYTHROCYTE [DISTWIDTH] IN BLOOD BY AUTOMATED COUNT: 15.1 % (ref 11.6–15.1)
FERRITIN SERPL-MCNC: 41 NG/ML (ref 11–307)
GFR SERPL CREATININE-BSD FRML MDRD: 71 ML/MIN/1.73SQ M
GLUCOSE P FAST SERPL-MCNC: 96 MG/DL (ref 65–99)
HCT VFR BLD AUTO: 47.1 % (ref 34.8–46.1)
HGB BLD-MCNC: 15.1 G/DL (ref 11.5–15.4)
IMM GRANULOCYTES # BLD AUTO: 0.03 THOUSAND/UL (ref 0–0.2)
IMM GRANULOCYTES NFR BLD AUTO: 1 % (ref 0–2)
IRON SATN MFR SERPL: 13 % (ref 15–50)
IRON SERPL-MCNC: 39 UG/DL (ref 50–212)
LYMPHOCYTES # BLD AUTO: 1.58 THOUSANDS/ÂΜL (ref 0.6–4.47)
LYMPHOCYTES NFR BLD AUTO: 28 % (ref 14–44)
MCH RBC QN AUTO: 27.6 PG (ref 26.8–34.3)
MCHC RBC AUTO-ENTMCNC: 32.1 G/DL (ref 31.4–37.4)
MCV RBC AUTO: 86 FL (ref 82–98)
MONOCYTES # BLD AUTO: 0.48 THOUSAND/ÂΜL (ref 0.17–1.22)
MONOCYTES NFR BLD AUTO: 8 % (ref 4–12)
NEUTROPHILS # BLD AUTO: 3.27 THOUSANDS/ÂΜL (ref 1.85–7.62)
NEUTS SEG NFR BLD AUTO: 57 % (ref 43–75)
NRBC BLD AUTO-RTO: 0 /100 WBCS
PLATELET # BLD AUTO: 591 THOUSANDS/UL (ref 149–390)
PMV BLD AUTO: 9.7 FL (ref 8.9–12.7)
POTASSIUM SERPL-SCNC: 4.2 MMOL/L (ref 3.5–5.3)
PROT SERPL-MCNC: 6.7 G/DL (ref 6.4–8.4)
RBC # BLD AUTO: 5.48 MILLION/UL (ref 3.81–5.12)
SODIUM SERPL-SCNC: 139 MMOL/L (ref 135–147)
TIBC SERPL-MCNC: 289 UG/DL (ref 250–450)
UIBC SERPL-MCNC: 250 UG/DL (ref 155–355)
WBC # BLD AUTO: 5.7 THOUSAND/UL (ref 4.31–10.16)

## 2024-03-14 PROCEDURE — 83550 IRON BINDING TEST: CPT | Performed by: INTERNAL MEDICINE

## 2024-03-14 PROCEDURE — 99396 PREV VISIT EST AGE 40-64: CPT | Performed by: INTERNAL MEDICINE

## 2024-03-14 PROCEDURE — 83540 ASSAY OF IRON: CPT | Performed by: INTERNAL MEDICINE

## 2024-03-14 PROCEDURE — 82728 ASSAY OF FERRITIN: CPT | Performed by: INTERNAL MEDICINE

## 2024-03-14 NOTE — TELEPHONE ENCOUNTER
Call out to patient for lab reminder prior to follow up on Monday, March 18th @ 10:30AM.  Lab was instructed to return call to office if there are further questions or needs to reschedule.  [employer] number provided for assistance.

## 2024-03-14 NOTE — TELEPHONE ENCOUNTER
Attempted returning call to Rajani, no answer, left her a detailed message with her mammogram results from 05/2023 and encouraged her to call the office back to re-schedule her appt.

## 2024-03-14 NOTE — PROGRESS NOTES
ADULT ANNUAL PHYSICAL  [facility] [address] [facility]    NAME: Kalyn Joshi  AGE: 54 y.o. SEX: female  : 1970     DATE: 3/14/2024     Assessment and Plan:     Problem List Items Addressed This Visit    None  Visit Diagnoses       Colon cancer screening    -  Primary    Annual physical exam                Immunizations and preventive care screenings were discussed with patient today. Appropriate education was printed on patient's after visit summary.    Counseling:  See below  Patient is here for annual physical.  She has recovered well from hysterectomy and bilateral oophorectomy.  She is doing overall well.  We will do DEXA scan next year.  She will follow-up with GI for colonoscopy.  Mammogram is scheduled.  She will get shingles vaccination at the pharmacy.  She will follow-up with hematology for thrombocytosis.  Blood work will be ordered.  I will see her back in a year       No follow-ups on file.     Chief Complaint:     Chief Complaint   Patient presents with    Physical Exam     Colorectal screen and osteoporosis screening due.       History of Present Illness:     Adult Annual Physical   Patient here for a comprehensive physical exam. The patient reports no problems.    Diet and Physical Activity  Diet/Nutrition: well balanced diet.   Exercise: walking.      Depression Screening  PHQ-2/9 Depression Screening    Little interest or pleasure in doing things: 0 - not at all  Feeling down, depressed, or hopeless: 0 - not at all  PHQ-2 Score: 0  PHQ-2 Interpretation: Negative depression screen       General Health  Sleep: sleeps well.   Hearing: normal - bilateral.  Vision: no vision problems.   Dental: brushes teeth twice daily.       /GYN Health  Follows with gynecology? yes   Patient is: postmenopausal  Last menstrual period: As above  Contraceptive method: menopause.    Advanced Care Planning  Do you have an advanced directive? yes  Do you  have a durable medical power of ? yes  ACP document given to the patient? no     Review of Systems:     Review of Systems   Past Medical History:     Past Medical History:   Diagnosis Date    Anxiety     Bacterial vaginosis     Breast cancer (HCC)     H/O bilateral breast implants     History of infection due to human papilloma virus (HPV)     last assessed - 03Jun2013    HPV (human papilloma virus) infection     cold knife endometrial curettage  today   1/20/2023    Hx of radiation therapy     BALDEMAR-2 gene mutation     Moderate dysplasia of cervix (EMILI II)     last assessed - 20Dec2013    Ovarian cyst     last assessed - 04Jun2013    Pap smear abnormality of cervix with ASCUS favoring dysplasia     last assessed - 19Dec2013    PONV (postoperative nausea and vomiting)     Secondary amenorrhea     Thrombocytosis     Use of tamoxifen (Nolvadex)     last assessed - 09May2017    Varicella       Past Surgical History:     Past Surgical History:   Procedure Laterality Date    BREAST LUMPECTOMY Right     last assessed - 13May2013    BREAST LUMPECTOMY Right 05/11/2009    BREAST LUMPECTOMY Right 06/05/2009    BREAST RECONSTRUCTION Bilateral 10/2018    bilateral implants    BREAST RECONSTRUCTION Right 02/2019    right implant    CERVICAL BIOPSY N/A 1/20/2023    Procedure: COLD KNIFE CONE, ENDOMETRIAL CURETTAGE;  Surgeon: Mel Kirkland MD;  [facility];  Service: Gynecology Oncology    CERVICAL BIOPSY  W/ LOOP ELECTRODE EXCISION      COLPOSCOPY W/ BIOPSY / CURETTAGE      Colposcopy Cervix with Biopsy(s) with Endocervical Currettage    CYSTOSCOPY N/A 1/25/2023    Procedure: CYSTOSCOPY;  Surgeon: Trey Yeboah MD;  [facility];  Service: Gynecology Oncology    CYSTOSCOPY N/A 11/15/2023    Procedure: Cystoscopy;  Surgeon: Trey Yeboah MD;  [facility];  Service: Gynecology Oncology    HYSTERECTOMY Bilateral 11/15/2023    Procedure: Total laparoscopic hysterectomy, bilateral  salpingo-oophorectomy;  Surgeon: Trey Yeboah MD;  [facility];  Service: Gynecology Oncology    MASTECTOMY, PARTIAL Right 2009    right partial mastectomy re-excision 09    PA LAPS ABD PRTM&OMENTUM DX W/WO SPEC BR/WA SPX N/A 2023    Procedure: LAPAROSCOPY DIAGNOSTIC, PELVIC WASHOUT AND PELVIC DRAIN PLACEMENT;  Surgeon: Trey Yeboah MD;  [facility];  Service: Gynecology Oncology    SENTINEL LYMPH NODE BIOPSY Right       Social History:     Social History     Socioeconomic History    Marital status: Single     Spouse name: None    Number of children: None    Years of education: None    Highest education level: None   Occupational History    None   Tobacco Use    Smoking status: Former     Current packs/day: 0.00     Average packs/day: 0.3 packs/day for 26.0 years (6.5 ttl pk-yrs)     Types: Cigarettes     Start date:      Quit date:      Years since quittin.2    Smokeless tobacco: Never    Tobacco comments:     former social smoker   Vaping Use    Vaping status: Never Used   Substance and Sexual Activity    Alcohol use: Yes     Alcohol/week: 2.0 standard drinks of alcohol     Types: 2 Cans of beer per week     Comment: 4  x   weekly    Drug use: No    Sexual activity: Yes     Partners: Male     Birth control/protection: Implant   Other Topics Concern    None   Social History Narrative    Caffeine use    Marital History -     Yarsanism Affiliation - Mormonism    Uses safety equipment - Seat belts     Social Determinants of Health     Financial Resource Strain: Not on file   Food Insecurity: Not on file   Transportation Needs: Not on file   Physical Activity: Not on file   Stress: Not on file   Social Connections: Not on file   Intimate Partner Violence: Not on file   Housing Stability: Not on file      Family History:     Family History   Problem Relation Age of Onset    No Known Problems Mother     No Known Problems Father     No Known Problems  "Sister     No Known Problems Maternal Aunt     Cancer Paternal Aunt 60    No Known Problems Paternal Aunt     No Known Problems Paternal Aunt     No Known Problems Maternal Grandmother     No Known Problems Maternal Grandfather     Ovarian cancer Paternal Grandmother         age unknown    No Known Problems Paternal Grandfather     Colon polyps Cousin     Colon cancer Cousin 50    No Known Problems Half-Sister       Current Medications:     Current Outpatient Medications   Medication Sig Dispense Refill    aspirin (ECOTRIN LOW STRENGTH) 81 mg EC tablet Take 81 mg by mouth daily       No current facility-administered medications for this visit.      Allergies:     Allergies   Allergen Reactions    Cephalexin Rash      Physical Exam:     /82 (BP Location: Left arm, Patient Position: Sitting, Cuff Size: Standard)   Pulse 62   Temp 97.8 °F (36.6 °C) (Tympanic)   Resp 16   Ht 5' 3\" (1.6 m)   Wt 55 kg (121 lb 3.2 oz)   LMP  (LMP Unknown)   SpO2 98%   BMI 21.47 kg/m²     Physical Exam  Constitutional:       General: She is not in acute distress.     Appearance: She is not diaphoretic.   Eyes:      General: No scleral icterus.     Conjunctiva/sclera: Conjunctivae normal.   Neck:      Thyroid: No thyromegaly.      Vascular: No carotid bruit.   Cardiovascular:      Rate and Rhythm: Normal rate and regular rhythm.      Heart sounds: Normal heart sounds. No murmur heard.  Pulmonary:      Effort: Pulmonary effort is normal. No respiratory distress.      Breath sounds: Normal breath sounds. No stridor. No wheezing or rales.   Abdominal:      General: Bowel sounds are normal. There is no distension.      Palpations: Abdomen is soft. There is no mass.      Tenderness: There is no abdominal tenderness. There is no guarding.   Musculoskeletal:      Right lower leg: No edema.      Left lower leg: No edema.   Lymphadenopathy:      Cervical: No cervical adenopathy.   Skin:     General: Skin is warm.      Coloration: Skin " is not pale.   Neurological:      Mental Status: She is alert and oriented to person, place, and time.      Cranial Nerves: No cranial nerve deficit.      Coordination: Coordination normal.   Psychiatric:         Behavior: Behavior normal.          Marilyn Ceron MD  [facility]

## 2024-03-14 NOTE — TELEPHONE ENCOUNTER
Returned call to patient and R/Sed her follow up appt to 05/31/24 at 10:30 AM with INOCENCIA Alas. She was appreciative

## 2024-03-14 NOTE — TELEPHONE ENCOUNTER
Patient Call    Who are you speaking with? Patient    If it is not the patient, are they listed on an active communication consent form? Yes   What is the reason for this call? Calling and requested to speak with Lindsey   Does this require a call back? Yes   If a call back is required, please list best call back number [phone]   If a call back is required, advise that a message will be forwarded to their care team and someone will return their call as soon as possible.   Did you relay this information to the patient? Yes

## 2024-03-15 ENCOUNTER — TELEPHONE (OUTPATIENT)
Dept: HEMATOLOGY ONCOLOGY | Facility: CLINIC | Age: 54
End: 2024-03-15

## 2024-03-15 DIAGNOSIS — E61.1 IRON DEFICIENCY: Primary | ICD-10-CM

## 2024-03-15 RX ORDER — FERROUS SULFATE 324(65)MG
TABLET, DELAYED RELEASE (ENTERIC COATED) ORAL
Qty: 30 TABLET | Refills: 1 | Status: SHIPPED | OUTPATIENT
Start: 2024-03-15

## 2024-03-15 RX ORDER — ASCORBIC ACID 500 MG
TABLET ORAL
Qty: 30 TABLET | Refills: 1 | Status: SHIPPED | OUTPATIENT
Start: 2024-03-15

## 2024-03-15 NOTE — TELEPHONE ENCOUNTER
Patient Call    Who are you speaking with? Patient    If it is not the patient, are they listed on an active communication consent form? N/A   What is the reason for this call? Pt is calling in regards to blood work she had done yesterday. Pt states the results she sees in her my chart look like they were the orders from her PCP. Pt has an appt on 3/18/24 with Karissa and she wants to make sure that the office has the blood work that they need for that appt. Pt would like a call back regarding this please.    Does this require a call back? Yes   If a call back is required, please list best call back number [phone]   If a call back is required, advise that a message will be forwarded to their care team and someone will return their call as soon as possible.   Did you relay this information to the patient? Yes

## 2024-03-15 NOTE — RESULT ENCOUNTER NOTE
Hemoglobin is good however iron levels are low.  Calling in iron supplementation.  Please follow-up with GI for colonoscopy

## 2024-03-18 ENCOUNTER — OFFICE VISIT (OUTPATIENT)
Dept: HEMATOLOGY ONCOLOGY | Facility: CLINIC | Age: 54
End: 2024-03-18
Payer: COMMERCIAL

## 2024-03-18 VITALS
SYSTOLIC BLOOD PRESSURE: 142 MMHG | HEIGHT: 63 IN | DIASTOLIC BLOOD PRESSURE: 81 MMHG | RESPIRATION RATE: 14 BRPM | OXYGEN SATURATION: 99 % | WEIGHT: 122 LBS | BODY MASS INDEX: 21.62 KG/M2 | TEMPERATURE: 98.2 F | HEART RATE: 77 BPM

## 2024-03-18 DIAGNOSIS — D50.8 IRON DEFICIENCY ANEMIA SECONDARY TO INADEQUATE DIETARY IRON INTAKE: ICD-10-CM

## 2024-03-18 DIAGNOSIS — D47.3 ESSENTIAL THROMBOCYTOSIS (HCC): Primary | ICD-10-CM

## 2024-03-18 DIAGNOSIS — R16.1 SPLENOMEGALY: ICD-10-CM

## 2024-03-18 PROCEDURE — 99214 OFFICE O/P EST MOD 30 MIN: CPT | Performed by: NURSE PRACTITIONER

## 2024-03-18 NOTE — PROGRESS NOTES
[facility] [place]  [address] [address]  [phone]  Hematology Ambulatory Follow-Up  Kalyn Joshi, 1970, [id number]  3/18/2024    Assessment/Plan:    1. Splenomegaly  2. Essential thrombocytosis (HCC)  3. Iron deficiency anemia secondary to inadequate dietary iron intake  Patient is a 54-year-old female with a history of JAK2 positive essential thrombocytosis currently on aspirin. Platelets range from 458 up to 769.  Most recent labs from 3/14/2024 show a normal WBC, RBC 5.48, hemoglobin 15.1, hematocrit 47.1, platelets 591 basophils 2% with an absolute basophil count of 0.11, otherwise normal differential.  Metabolic panel within normal limits, iron panel showed a saturation of 13% with a ferritin of 41.  She was already prescribed ferrous sulfate along with vitamin C but has not started as of yet.  I reviewed that ferrous sulfate can cause GI upset, specifically constipation.  She could also try Vitron C which has ferrous carboxyl that may be gentler on the stomach.  We will repeat iron level in 6 months.  In January 2023 patient was hospitalized with an infection after pelvic surgery.  She was found to have splenomegaly measuring 19.3 cm.  Ultrasound of the abdomen showed spleen measuring 13.8 x 5.3 x 14.8 cm in April 2023.  We will repeat ultrasound in the near future, I will call her with the results or message through [employer].  Patient is up-to-date on mammogram, history of ER/AR positive breast cancer in 2009.  Bone survey on April 25, 2023 showed no directive osseous lesions.  We will see her in 1 year with labs every 6 months managing accordingly.    - CBC; Standing  - Iron Panel (Includes Ferritin, Iron Sat%, Iron, and TIBC); Standing  - Comprehensive metabolic panel; Standing  - US abdomen complete; Future    Patient verbalized understanding and is in agreement to the plan. Patient knows to call the Hematology/Oncology  office with any questions and concerns regarding the above.    Barrier(s) to care: None  The patient is able to self care.    Karissa PRO  Medical Oncology/Hematology  [facility]    Interval history: clinically stable    Review of Systems   Constitutional:  Negative for activity change, appetite change, fatigue, fever and unexpected weight change.   Respiratory:  Negative for cough and shortness of breath.    Cardiovascular:  Negative for chest pain and leg swelling.   Gastrointestinal:  Negative for abdominal pain, constipation, diarrhea and nausea.   Endocrine: Negative for cold intolerance and heat intolerance.   Musculoskeletal:  Negative for arthralgias and myalgias.   Skin: Negative.    Neurological:  Negative for dizziness, weakness and headaches.   Hematological:  Negative for adenopathy. Does not bruise/bleed easily.     Past Medical History:   Diagnosis Date    Anxiety     Bacterial vaginosis     Breast cancer (HCC)     H/O bilateral breast implants     History of infection due to human papilloma virus (HPV)     last assessed - 03Jun2013    HPV (human papilloma virus) infection     cold knife endometrial curettage  today   1/20/2023    Hx of radiation therapy     BALDEMAR-2 gene mutation     Moderate dysplasia of cervix (EMILI II)     last assessed - 20Dec2013    Ovarian cyst     last assessed - 04Jun2013    Pap smear abnormality of cervix with ASCUS favoring dysplasia     last assessed - 19Dec2013    PONV (postoperative nausea and vomiting)     Secondary amenorrhea     Thrombocytosis     Use of tamoxifen (Nolvadex)     last assessed - 09May2017    Varicella      Past Surgical History:   Procedure Laterality Date    BREAST LUMPECTOMY Right     last assessed - 13May2013    BREAST LUMPECTOMY Right 05/11/2009    BREAST LUMPECTOMY Right 06/05/2009    BREAST RECONSTRUCTION Bilateral 10/2018    bilateral implants    BREAST RECONSTRUCTION Right 02/2019    right implant    CERVICAL BIOPSY  "N/A 1/20/2023    Procedure: COLD KNIFE CONE, ENDOMETRIAL CURETTAGE;  Surgeon: Mel Kirkland MD;  [facility];  Service: Gynecology Oncology    CERVICAL BIOPSY  W/ LOOP ELECTRODE EXCISION      COLPOSCOPY W/ BIOPSY / CURETTAGE      Colposcopy Cervix with Biopsy(s) with Endocervical Currettage    CYSTOSCOPY N/A 1/25/2023    Procedure: CYSTOSCOPY;  Surgeon: Trey Yeboah MD;  [facility];  Service: Gynecology Oncology    CYSTOSCOPY N/A 11/15/2023    Procedure: Cystoscopy;  Surgeon: Trey Yeboah MD;  [facility];  Service: Gynecology Oncology    HYSTERECTOMY Bilateral 11/15/2023    Procedure: Total laparoscopic hysterectomy, bilateral salpingo-oophorectomy;  Surgeon: Trey Yeboah MD;  [facility];  Service: Gynecology Oncology    MASTECTOMY, PARTIAL Right 05/11/2009    right partial mastectomy re-excision 6/26/09    CO LAPS ABD PRTM&OMENTUM DX W/WO SPEC BR/WA SPX N/A 1/25/2023    Procedure: LAPAROSCOPY DIAGNOSTIC, PELVIC WASHOUT AND PELVIC DRAIN PLACEMENT;  Surgeon: Trey Yeboah MD;  [facility];  Service: Gynecology Oncology    SENTINEL LYMPH NODE BIOPSY Right      Current Outpatient Medications:     ascorbic acid (VITAMIN C) 500 mg tablet, 1 pill Monday Wednesday Friday with iron tablets, Disp: 30 tablet, Rfl: 1    aspirin (ECOTRIN LOW STRENGTH) 81 mg EC tablet, Take 81 mg by mouth daily, Disp: , Rfl:     ferrous sulfate 324 (65 Fe) mg, 1 pill Monday Wednesday Friday, Disp: 30 tablet, Rfl: 1    Allergies   Allergen Reactions    Cephalexin Rash     Objective:  /81 (BP Location: Left arm, Patient Position: Sitting, Cuff Size: Standard)   Pulse 77   Temp 98.2 °F (36.8 °C) (Temporal)   Resp 14   Ht 5' 3\" (1.6 m)   Wt 55.3 kg (122 lb)   LMP  (LMP Unknown)   SpO2 99%   BMI 21.61 kg/m²    Physical Exam  Vitals reviewed.   Constitutional:       Appearance: Normal appearance. She is well-developed.   HENT:      Head: Normocephalic and atraumatic. "   Eyes:      Conjunctiva/sclera: Conjunctivae normal.      Pupils: Pupils are equal, round, and reactive to light.   Pulmonary:      Effort: Pulmonary effort is normal. No respiratory distress.   Musculoskeletal:         General: Normal range of motion.      Cervical back: Normal range of motion.   Lymphadenopathy:      Cervical: No cervical adenopathy.   Skin:     General: Skin is dry.   Neurological:      Mental Status: She is alert and oriented to person, place, and time.   Psychiatric:         Behavior: Behavior normal.     Result Review  Labs:  Lab Results   Component Value Date    WBC 5.70 03/14/2024    HGB 15.1 03/14/2024    HCT 47.1 (H) 03/14/2024    MCV 86 03/14/2024     (H) 03/14/2024     Lab Results   Component Value Date     02/03/2016    SODIUM 139 03/14/2024    K 4.2 03/14/2024     03/14/2024    CO2 26 03/14/2024    ANIONGAP 3 (L) 04/06/2015    AGAP 9 03/14/2024    BUN 22 03/14/2024    CREATININE 0.92 03/14/2024    GLUC 58 (L) 02/06/2023    GLUF 96 03/14/2024    CALCIUM 9.7 03/14/2024    AST 22 03/14/2024    ALT 12 03/14/2024    ALKPHOS 65 03/14/2024    PROT 6.6 02/03/2016    TP 6.7 03/14/2024    BILITOT 0.7 02/03/2016    TBILI 0.66 03/14/2024    EGFR 71 03/14/2024     Imaging:  No results found.    Please note:  This report has been generated by a voice recognition software system. Therefore there may be syntax, spelling, and/or grammatical errors. Please call if you have any questions.

## 2024-04-08 ENCOUNTER — HOSPITAL ENCOUNTER (OUTPATIENT)
Dept: ULTRASOUND IMAGING | Facility: MEDICAL CENTER | Age: 54
Discharge: HOME | End: 2024-04-08
Payer: COMMERCIAL

## 2024-04-08 DIAGNOSIS — R16.1 SPLENOMEGALY: ICD-10-CM

## 2024-04-08 DIAGNOSIS — D47.3 ESSENTIAL THROMBOCYTOSIS (HCC): ICD-10-CM

## 2024-04-08 PROCEDURE — 76700 US EXAM ABDOM COMPLETE: CPT

## 2024-05-13 ENCOUNTER — TELEPHONE (OUTPATIENT)
Dept: HEMATOLOGY ONCOLOGY | Facility: CLINIC | Age: 54
End: 2024-05-13

## 2024-05-13 NOTE — TELEPHONE ENCOUNTER
Appointment Change  Cancel, Reschedule, Change to Virtual      Who are you speaking with? Patient   If it is not the patient, is the caller listed on the communication consent form? N/A   Which provider is the appointment scheduled with? INOCENCIA Oliveira   When was the original appointment scheduled?    Please list date and time 05/31/2024 @ 10:30AM    At which location is the appointment scheduled to take place? [place]   Was the appointment rescheduled?     Was the appointment changed from an in person visit to a virtual visit?    If so, please list the details of the change.   07/18/2024 @10:30AM with Zuleima Lawson    What is the reason for the appointment change? Rescheduled mammo

## 2024-05-17 DIAGNOSIS — E61.1 IRON DEFICIENCY: ICD-10-CM

## 2024-05-17 RX ORDER — ASCORBIC ACID 500 MG
TABLET ORAL
Qty: 36 TABLET | Refills: 5 | Status: SHIPPED | OUTPATIENT
Start: 2024-05-17

## 2024-05-17 RX ORDER — FERROUS SULFATE 324(65)MG
TABLET, DELAYED RELEASE (ENTERIC COATED) ORAL
Qty: 36 TABLET | Refills: 5 | Status: SHIPPED | OUTPATIENT
Start: 2024-05-17

## 2024-06-26 ENCOUNTER — NURSE TRIAGE (OUTPATIENT)
Age: 54
End: 2024-06-26

## 2024-06-26 NOTE — TELEPHONE ENCOUNTER
"Patient is calling in, she states she had a hysterectomy in November with removal of part of her IUD that broke off and was retained. She is having clear thin yellowish discharge. She denies any odors, pelvic pain or fevers. She denies any vaginal itching. She would like to be seen due to her history. Appt made for 6/27      Reason for Disposition  • Rash is tiny water blisters (3 or more)    Answer Assessment - Initial Assessment Questions  1. DISCHARGE: \"Describe the discharge.\" (e.g., white, yellow, green, gray, foamy, cottage cheese-like)      Clear and thin- maybe a little yellow  2. ODOR: \"Is there a bad odor?\"      Denies   3. ONSET: \"When did the discharge begin?\"      Last week   4. RASH: \"Is there a rash in that area?\" If Yes, ask: \"Describe it.\" (e.g., redness, blisters, sores, bumps)      Denies   5. ABDOMINAL PAIN: \"Are you having any abdominal pain?\" If Yes, ask: \"What does it feel like? \" (e.g., crampy, dull, intermittent, constant)       Denies   6. ABDOMINAL PAIN SEVERITY: If present, ask: \"How bad is it?\"  (e.g., mild, moderate, severe)   - MILD - doesn't interfere with normal activities    - MODERATE - interferes with normal activities or awakens from sleep    - SEVERE - patient doesn't want to move (R/O peritonitis)       Denies   7. CAUSE: \"What do you think is causing the discharge?\" \"Have you had the same problem before? What happened then?\"      Unsure  8. OTHER SYMPTOMS: \"Do you have any other symptoms?\" (e.g., fever, itching, vaginal bleeding, pain with urination, injury to genital area, vaginal foreign body)      Hysterectomy removed in November, denies vaginal itching    Protocols used: Vaginal Discharge-ADULT-OH    "

## 2024-06-26 NOTE — TELEPHONE ENCOUNTER
----- Message from Kailyn NEGRON sent at 6/26/2024  8:19 AM EDT -----  Patient had a hysterectomy Nov of 2023. She has vaginal discharge and is concerned that her IUD is not in place that might be causing an infection. Please advise.

## 2024-06-27 ENCOUNTER — OFFICE VISIT (OUTPATIENT)
Dept: GYNECOLOGY | Facility: CLINIC | Age: 54
End: 2024-06-27
Payer: COMMERCIAL

## 2024-06-27 VITALS — BODY MASS INDEX: 21.43 KG/M2 | SYSTOLIC BLOOD PRESSURE: 122 MMHG | DIASTOLIC BLOOD PRESSURE: 70 MMHG | WEIGHT: 121 LBS

## 2024-06-27 DIAGNOSIS — N89.8 VAGINAL DISCHARGE: Primary | ICD-10-CM

## 2024-06-27 LAB
BV WHIFF TEST VAG QL: NORMAL
CLUE CELLS SPEC QL WET PREP: NORMAL
PH SMN: 4.5 [PH]
SL AMB POCT WET MOUNT: NORMAL
T VAGINALIS VAG QL WET PREP: NORMAL
YEAST VAG QL WET PREP: NORMAL

## 2024-06-27 PROCEDURE — 87210 SMEAR WET MOUNT SALINE/INK: CPT | Performed by: OBSTETRICS & GYNECOLOGY

## 2024-06-27 PROCEDURE — 99213 OFFICE O/P EST LOW 20 MIN: CPT | Performed by: OBSTETRICS & GYNECOLOGY

## 2024-06-27 NOTE — PROGRESS NOTES
Assessment/Plan:     Vaginal discharge-she was reassured that on exam and on wet mount, this appeared normal.  We did discuss vaginal lubricant and moisturizer.  She has some mild atrophic changes on exam.  There is no sign of infection.      Vaginal dryness-we discussed lubricants and moisturizer and she was given a list to review.  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    She will return for her yearly or as needed     There are no diagnoses linked to this encounter.      Subjective:     Patient ID: Kalyn Joshi is a 54 y.o. female.    Patient here for evaluation of vaginal discharge.  It is thin and clear to white/yellow-tinged.  No odor.   She is concerned because she had a conization and had a complication after and then she had a retained, embedded IUD.  She had a hysterectomy, BSO in November because of this and history of recurrent dysplasia.    She has no itching or any other symptoms.  She wanted to be sure there was no infection because she had a difficult time with this as above.    She is sexually active, no new partner.  She has some minimal vaginal dryness.          Review of Systems   Genitourinary:  Positive for vaginal discharge. Negative for vaginal bleeding.        Vaginal dryness         Objective:     Physical Exam  Genitourinary:     General: Normal vulva.      Vagina: Normal.      Uterus: Absent.       Comments: Vaginal cuff is normal, well-healed  Mild atrophic changes  Minimal, normal-appearing white discharge

## 2024-07-02 ENCOUNTER — HOSPITAL ENCOUNTER (OUTPATIENT)
Dept: MAMMOGRAPHY | Facility: MEDICAL CENTER | Age: 54
Discharge: HOME | End: 2024-07-02
Payer: COMMERCIAL

## 2024-07-02 VITALS — WEIGHT: 121 LBS | BODY MASS INDEX: 21.44 KG/M2 | HEIGHT: 63 IN

## 2024-07-02 DIAGNOSIS — Z12.39 BREAST CANCER SCREENING, HIGH RISK PATIENT: ICD-10-CM

## 2024-07-02 PROCEDURE — 77063 BREAST TOMOSYNTHESIS BI: CPT

## 2024-07-02 PROCEDURE — 77067 SCR MAMMO BI INCL CAD: CPT

## 2024-07-18 ENCOUNTER — OFFICE VISIT (OUTPATIENT)
Dept: SURGICAL ONCOLOGY | Facility: CLINIC | Age: 54
End: 2024-07-18
Payer: COMMERCIAL

## 2024-07-18 VITALS
TEMPERATURE: 97.6 F | BODY MASS INDEX: 21.09 KG/M2 | HEIGHT: 63 IN | HEART RATE: 73 BPM | OXYGEN SATURATION: 98 % | SYSTOLIC BLOOD PRESSURE: 110 MMHG | WEIGHT: 119 LBS | DIASTOLIC BLOOD PRESSURE: 76 MMHG

## 2024-07-18 DIAGNOSIS — Z12.39 BREAST CANCER SCREENING, HIGH RISK PATIENT: ICD-10-CM

## 2024-07-18 DIAGNOSIS — Z85.3 ENCOUNTER FOR FOLLOW-UP SURVEILLANCE OF BREAST CANCER: Primary | ICD-10-CM

## 2024-07-18 DIAGNOSIS — Z98.890 STATUS POST RIGHT BREAST LUMPECTOMY: ICD-10-CM

## 2024-07-18 DIAGNOSIS — Z12.31 VISIT FOR SCREENING MAMMOGRAM: ICD-10-CM

## 2024-07-18 DIAGNOSIS — Z08 ENCOUNTER FOR FOLLOW-UP SURVEILLANCE OF BREAST CANCER: Primary | ICD-10-CM

## 2024-07-18 DIAGNOSIS — Z85.3 PERSONAL HISTORY OF ADENOCARCINOMA OF BREAST: ICD-10-CM

## 2024-07-18 PROCEDURE — 99213 OFFICE O/P EST LOW 20 MIN: CPT

## 2024-07-18 NOTE — PROGRESS NOTES
Surgical Oncology Follow Up       [address]  [facility] [place]  [address]  [facility] [id number]    Kalyn Madelyn  1970  [id number]  [address]  [facility] [place]  [address]  [facility] [id number]    Chief Complaint   Patient presents with   • Follow-up       Assessment/Plan:  1. Encounter for follow-up surveillance of breast cancer  - 1 year f/u    2. Personal history of adenocarcinoma of breast    3. Breast cancer screening, high risk patient    4. Status post right breast lumpectomy    5. Visit for screening mammogram  - Mammo screening bilateral w 3d & cad; Future      Discussion/Summary: Patient is a 54 year old female presenting for a 1 year f/u for hx of breast cancer dx in 2009. Pathology revealed right breast IDC ER/PA+, HER2-. She had a lumpectomy with sln and WBRT. She has b/l implants. She took tamoxifen for 5 years. She is now on obs. She had a b/l screening mammo on 7/2/24 which was BIRADS 2 category 3 density. There were no concerns on her clinical breast exam. We will see the patient back in 1 year or sooner should the need arise. She was instructed to call with any questions or concerns prior to this time. All questions were answered today.      History of Present Illness:     Oncology History Overview Note   9/2018 - observation with 81 mg ASA daily     Personal history of adenocarcinoma of breast   4/2009 Initial Diagnosis    Invasive ductal carcinoma of breast, female, right (HCC)     4/2009 - 5/2009 Cancer Staged     T1 B. N0, ER/PA positive, HER-2 negative, infiltrating ductal carcinoma.   Stage IA, right     5/11/2009 Surgery    Right partial mastectomy, SLNB, breast implant.  Dr Ca     5/2009 Genetic Testing    BRCA negative     2009 - 2009 Radiation    WBRT.  Dr Dao     6/2009 - 6/2014 Hormone Therapy    Tamoxifen     Essential thrombocytosis (HCC)   6/13/2018 Initial Diagnosis    In  the past, Plt count had increased and return to normal.  Pt was screened for ET with Jorge mutation and was negative.      Then in June 2018, platelet count elevated again and mutation testing was completed.      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    BCR-aBL was negative via FISH.           -Interval History: Patient is a 54 year old female presenting for a 1 year f/u for hx of breast cancer dx in 2009. She is now on obs. She had a b/l screening mammo on 7/2/24 which was BIRADS 2 category 3 density.  Patient denies changes on her breast exam. She denies persistent headaches, bone pain, back pain, shortness of breath, or abdominal pain.      Review of Systems:  Review of Systems   Constitutional:  Negative for activity change, appetite change, fatigue and unexpected weight change.   Respiratory:  Negative for cough and shortness of breath.    Cardiovascular:  Negative for chest pain.   Gastrointestinal:  Negative for abdominal pain, diarrhea, nausea and vomiting.   Endocrine: Negative for heat intolerance.   Musculoskeletal:  Negative for arthralgias, back pain and myalgias.   Skin:  Negative for rash.   Neurological:  Negative for weakness and headaches.   Hematological:  Negative for adenopathy.       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   • Retained intrauterine contraceptive device (IUD)   • Anxiety   • S/P  hysterectomy with oophorectomy     Past Medical History:   Diagnosis Date   • Anxiety    • Bacterial vaginosis    • Breast cancer (HCC)    • H/O bilateral breast implants    • History of infection due to human papilloma virus (HPV)     last assessed - 03Jun2013   • HPV (human papilloma virus) infection     cold knife endometrial curettage  today   1/20/2023   • Hx of radiation therapy    • BALDEMAR-2 gene mutation    • Moderate dysplasia of cervix (EMILI II)     last assessed - 20Dec2013   • Ovarian cyst     last assessed - 04Jun2013   • Pap smear abnormality of cervix with ASCUS favoring dysplasia     last assessed - 19Dec2013   • PONV (postoperative nausea and vomiting)    • Secondary amenorrhea    • Thrombocytosis    • Use of tamoxifen (Nolvadex)     last assessed - 09May2017   • Varicella      Past Surgical History:   Procedure Laterality Date   • BREAST LUMPECTOMY Right     last assessed - 13May2013   • BREAST LUMPECTOMY Right 05/11/2009   • BREAST LUMPECTOMY Right 06/05/2009   • BREAST RECONSTRUCTION Bilateral 10/2018    bilateral implants   • BREAST RECONSTRUCTION Right 02/2019    right implant   • CERVICAL BIOPSY N/A 1/20/2023    Procedure: COLD KNIFE CONE, ENDOMETRIAL CURETTAGE;  Surgeon: Mel Kirkland MD;  [facility];  Service: Gynecology Oncology   • CERVICAL BIOPSY  W/ LOOP ELECTRODE EXCISION     • COLPOSCOPY W/ BIOPSY / CURETTAGE      Colposcopy Cervix with Biopsy(s) with Endocervical Currettage   • CYSTOSCOPY N/A 1/25/2023    Procedure: CYSTOSCOPY;  Surgeon: Trey Yeboah MD;  [facility];  Service: Gynecology Oncology   • CYSTOSCOPY N/A 11/15/2023    Procedure: Cystoscopy;  Surgeon: Trey Yeboah MD;  [facility];  Service: Gynecology Oncology   • HYSTERECTOMY Bilateral 11/15/2023    Procedure: Total laparoscopic hysterectomy, bilateral salpingo-oophorectomy;  Surgeon: Trey Yeboah MD;  [facility];  Service: Gynecology Oncology   • MASTECTOMY, PARTIAL  Right 2009    right partial mastectomy re-excision 09   • NJ LAPS ABD PRTM&OMENTUM DX W/WO SPEC BR/WA SPX N/A 2023    Procedure: LAPAROSCOPY DIAGNOSTIC, PELVIC WASHOUT AND PELVIC DRAIN PLACEMENT;  Surgeon: Trey Yeboah MD;  [facility]: [facility];  Service: Gynecology Oncology   • SENTINEL LYMPH NODE BIOPSY Right      Family History   Problem Relation Age of Onset   • No Known Problems Mother    • No Known Problems Father    • No Known Problems Sister    • No Known Problems Maternal Aunt    • Cancer Paternal Aunt 60   • No Known Problems Paternal Aunt    • No Known Problems Paternal Aunt    • No Known Problems Maternal Grandmother    • No Known Problems Maternal Grandfather    • Ovarian cancer Paternal Grandmother         age unknown   • No Known Problems Paternal Grandfather    • Colon polyps Cousin    • Colon cancer Cousin 50   • No Known Problems Half-Sister      Social History     Socioeconomic History   • Marital status: Single     Spouse name: Not on file   • Number of children: Not on file   • Years of education: Not on file   • Highest education level: Not on file   Occupational History   • Not on file   Tobacco Use   • Smoking status: Former     Current packs/day: 0.00     Average packs/day: 0.3 packs/day for 26.0 years (6.5 ttl pk-yrs)     Types: Cigarettes     Start date:      Quit date: 2012     Years since quittin.5   • Smokeless tobacco: Never   • Tobacco comments:     former social smoker   Vaping Use   • Vaping status: Never Used   Substance and Sexual Activity   • Alcohol use: Yes     Alcohol/week: 2.0 standard drinks of alcohol     Types: 2 Cans of beer per week     Comment: 4  x   weekly   • Drug use: No   • Sexual activity: Yes     Partners: Male     Birth control/protection: Implant   Other Topics Concern   • Not on file   Social History Narrative    Caffeine use    Marital History -     Confucianism Affiliation - Denominational    Uses safety equipment - Seat  daniel     Social Determinants of Health     Financial Resource Strain: Not on file   Food Insecurity: Not on file   Transportation Needs: Not on file   Physical Activity: Not on file   Stress: Not on file   Social Connections: Not on file   Intimate Partner Violence: Not on file   Housing Stability: Not on file       Current Outpatient Medications:   •  ascorbic acid (VITAMIN C) 500 mg tablet, TAKE 1 TABLET BY MOUTH MON, WED, FRI WITH IRON TABS, Disp: 36 tablet, Rfl: 5  •  aspirin (ECOTRIN LOW STRENGTH) 81 mg EC tablet, Take 81 mg by mouth daily, Disp: , Rfl:   •  ferrous sulfate 324 (65 Fe) mg, TAKE 1 TABLET BY MOUTH ON MON, WED, FRI, Disp: 36 tablet, Rfl: 5  •  tretinoin (RETIN-A) 0.025 % cream, as needed, Disp: , Rfl:   Allergies   Allergen Reactions   • Cephalexin Rash     Vitals:    07/18/24 1035   BP: 110/76   Pulse: 73   Temp: 97.6 °F (36.4 °C)   SpO2: 98%       Physical Exam  Constitutional:       General: She is not in acute distress.     Appearance: Normal appearance.   Cardiovascular:      Rate and Rhythm: Normal rate and regular rhythm.      Pulses: Normal pulses.      Heart sounds: Normal heart sounds.   Pulmonary:      Effort: Pulmonary effort is normal.      Breath sounds: Normal breath sounds.   Chest:      Chest wall: No mass.   Breasts:     Right: No swelling, bleeding, inverted nipple, mass, nipple discharge, skin change or tenderness.      Left: No swelling, bleeding, inverted nipple, mass, nipple discharge, skin change or tenderness.      Comments: B/l implants present   Abdominal:      General: Abdomen is flat.      Palpations: Abdomen is soft.   Lymphadenopathy:      Upper Body:      Right upper body: No supraclavicular, axillary or pectoral adenopathy.      Left upper body: No supraclavicular, axillary or pectoral adenopathy.   Skin:     General: Skin is warm.   Neurological:      General: No focal deficit present.      Mental Status: She is alert and oriented to person, place, and time.    Psychiatric:         Mood and Affect: Mood normal.         Behavior: Behavior normal.           Results:    Imaging  Mammo screening bilateral w 3d & cad    Result Date: 7/3/2024  Narrative: DIAGNOSIS: Breast cancer screening, high risk patient TECHNIQUE: Digital screening mammography was performed. Computer Aided Detection (CAD) analyzed all applicable images. COMPARISONS: Prior breast imaging dated: 05/22/2023, 05/16/2022, 05/13/2021, 05/11/2020, 05/09/2019, 05/07/2018, 05/04/2017, 05/03/2016, and 04/16/2015 RELEVANT HISTORY: Family Breast Cancer History: No known family history of breast cancer. Family Medical History: Family medical history includes colon cancer in cousin and ovarian cancer in paternal grandmother. Personal History: Hormone history includes tamoxifen. Surgical history includes lumpectomy, mastectomy, hysterectomy, and breast explant. Medical history includes breast cancer. The patient is scheduled in a reminder system for screening mammography. 8-10% of cancers will be missed on mammography. Management of a palpable abnormality must be based on clinical grounds.  Patients will be notified of their results via letter from our facility. Accredited by [employer] and FDA. RISK ASSESSMENT: Tyrer-Cuzick risk assessment reporting was suppressed due to the patient's history and/or demographic factors. TISSUE DENSITY: The breasts are heterogeneously dense, which may obscure small masses. INDICATION: Kalyn Joshi is a 54 y.o. female presenting for screening mammography. FINDINGS: Bilateral There are no suspicious masses, grouped microcalcifications or areas of unexplained architectural distortion. The skin and nipple areolar complex are unremarkable.  Retropectoral silicone  implant(s) have been identified.    Impression: No mammographic evidence of malignancy. ASSESSMENT/BI-RADS CATEGORY: Left: 2 - Benign Right: 2 - Benign Overall: 2 - Benign RECOMMENDATION:      - Routine  screening mammogram in 1 year for both breasts. Workstation ID: [id number]       I reviewed the above imaging data.      Advance Care Planning/Advance Directives:  Discussed disease status, cancer treatment plans and/or cancer treatment goals with the patient.

## 2024-09-19 DIAGNOSIS — G89.29 CHRONIC SHOULDER PAIN, UNSPECIFIED LATERALITY: Primary | ICD-10-CM

## 2024-09-19 DIAGNOSIS — M25.519 CHRONIC SHOULDER PAIN, UNSPECIFIED LATERALITY: Primary | ICD-10-CM

## 2024-09-24 ENCOUNTER — EVALUATION (OUTPATIENT)
Dept: PHYSICAL THERAPY | Facility: MEDICAL CENTER | Age: 54
End: 2024-09-24
Payer: COMMERCIAL

## 2024-09-24 DIAGNOSIS — G89.29 CHRONIC LEFT SHOULDER PAIN: Primary | ICD-10-CM

## 2024-09-24 DIAGNOSIS — M25.512 CHRONIC LEFT SHOULDER PAIN: Primary | ICD-10-CM

## 2024-09-24 PROCEDURE — 97110 THERAPEUTIC EXERCISES: CPT | Performed by: PHYSICAL THERAPIST

## 2024-09-24 PROCEDURE — 97140 MANUAL THERAPY 1/> REGIONS: CPT | Performed by: PHYSICAL THERAPIST

## 2024-09-24 PROCEDURE — 97161 PT EVAL LOW COMPLEX 20 MIN: CPT | Performed by: PHYSICAL THERAPIST

## 2024-09-24 NOTE — PROGRESS NOTES
PT Evaluation     Today's date: 2024  Patient name: Kalyn Joshi  : 1970  MRN: [id number]  Referring provider: Marilyn Ceron MD  Dx:   Encounter Diagnosis   Name Primary?    Chronic left shoulder pain                   Assessment  Impairments: abnormal coordination, abnormal muscle firing, abnormal or restricted ROM, impaired physical strength, lacks appropriate home exercise program, pain with function, poor body mechanics and activity limitations    Assessment details: Kalyn Joshi is a 55 y/o female who presents with complaints of chronic left shoulder pain.  The patient's greatest concern is pain c/ certain movements.  Primary movement impairment diagnosis of scapular dyskinesis, resulting in pathoanatomical symptoms of chronic shoulder pain, which limits her ability to perform functional activities without pain.  Pt. will benefit from skilled PT services that includes manual therapy techniques to enhance tissue extensibility, neuromuscular re-education to facilitate motor control, therapeutic exercise to increase functional mobility, and modalities prn to reduce pain and inflammation.   Prognosis details: Prognosis given patient compliance to HEP and POC.     Goals  Impairment Goals  - Pt I with initial HEP in 1-2 visits  - Improve ROM equal to contralateral side in 4-6 weeks  - Increase strength to 5/5 in all affected areas in 4-6 weeks    Functional Goals  - Increase Functional Status Measure to: goal status  - Patient will be independent with comprehensive HEP in 6-8 weeks  - Patient will be able to reach for object from shelf at shoulder height with min reports of difficulty in 2-4 weeks  - Patient will be able to reach for object overhead with min to difficulty in 6-8 weeks  - Patient will be able to lift/carry objects without provocation of symptoms in 6-8 weeks  - Patient will be able to resume recreational activities in 6-8 weeks           Plan  Patient would benefit from: PT eval  Planned  modality interventions: thermotherapy: paraffin bath and cryotherapy    Planned therapy interventions: joint mobilization, manual therapy, neuromuscular re-education, postural training, strengthening, stretching, therapeutic activities, therapeutic exercise, home exercise program, graded exercise, graded activity, flexibility and body mechanics training    Frequency: 1-2x/week.  Therapy duration (weeks): 6-8 weeks.  Treatment plan discussed with: patient      Subjective Evaluation    History of Present Illness  Mechanism of injury: Patient reports that her left shoulder pain began in  without a specific RAMSES.  She followed up c/ PCP and was referred to physical therapy.  Patient states that the pain feels sharp and tight in nature and is aggravated with certain movements.  It is located throughout the anterolateral shoulder region.  The pain is not constant and does not wake her up at night.  She does have a hx of breast cancer.  Patient enjoys lifting light weights, but has been unable to do so d/t pain.  She would like to learn how to manage the pain for increased ease c/ daily activities.        Patient Goals  Patient goals for therapy: decreased pain    Pain  Current pain ratin  At best pain ratin  At worst pain rating: 10  Quality: tight and sharp  Relieving factors: rest  Exacerbated by: Leaning on the arm; lifting her arm with internal rotation; putting the arm in awkward positions.      Diagnostic Tests  No diagnostic tests performed  Treatments  Current treatment: physical therapy    Objective     Postural Observations  Seated posture: fair      Observations   Left Shoulder   Negative for trophic changes.     Palpation   Left   No palpable tenderness to the biceps and supraspinatus.   Hypertonic in the upper trapezius.   Tenderness of the anterior deltoid.     Tenderness     Left Shoulder   No tenderness in the biceps tendon (proximal) and supraspinatus tendon.     Cervical/Thoracic  Screen   Cervical range of motion within normal limits  Thoracic range of motion within normal limits    Neurological Testing     Sensation     Shoulder   Left Shoulder   Intact: light touch    Right Shoulder   Intact: Light touch    Additional Neurological Details  Reflexes NT.      Active Range of Motion   Left Shoulder   Flexion: 170 degrees with pain  Abduction: 170 degrees with pain  External rotation BTH: Active external rotation behind the head: T3.   Internal rotation BTB: Active internal rotation behind the back: T7.     Right Shoulder   Normal active range of motion    Passive Range of Motion   Left Shoulder   Normal passive range of motion    Right Shoulder   Normal passive range of motion    Scapular Mobility   Left Shoulder   Scapular Mobility with Shoulder to 90° FF   Scapular winging: severe  Scapular elevation: moderate    Right Shoulder   Scapular mobility: WFL    Joint Play   Left Shoulder  Joints within functional limits are the anterior capsule. Hypomobile in the posterior capsule and inferior capsule.    Right Shoulder  Joints within functional limits are the anterior capsule, posterior capsule and inferior capsule.     Strength/Myotome Testing     Left Shoulder     Planes of Motion   Flexion: 5   Abduction: 5   External rotation at 0°: 4-   Internal rotation at 0°: 4-     Isolated Muscles   Anterior deltoid: 5   Biceps: 5   Supraspinatus: 4   Triceps: 5   Upper trapezius: 5     Right Shoulder     Planes of Motion   Flexion: 5   Abduction: 5   External rotation at 0°: 5   Internal rotation at 0°: 5     Isolated Muscles   Anterior deltoid: 5   Biceps: 5   Supraspinatus: 5   Triceps: 5   Upper trapezius: 5     Additional Strength Details  Scapular strength testing deferred at this time d/t pain.    Tests     Left Shoulder   Positive empty can, Hawkin's, Neer's and painful arc.   Negative drop arm, full can and Speed's.        Precautions: Hx of CA    Manuals 9/24            G-H mobs AZ             PROM AZ                                      Neuro Re-Ed             Scap 4 2x10 5s                                                   Ther Ex             UBE             Pulleys 3'            Table slides FE AAROM 3x10                                                                             Ther Activity                                       Gait Training                                       Modalities              10'                                 Mel Wiley, PT  9/24/2024,12:39 PM

## 2024-10-01 ENCOUNTER — OFFICE VISIT (OUTPATIENT)
Dept: PHYSICAL THERAPY | Facility: MEDICAL CENTER | Age: 54
End: 2024-10-01
Payer: COMMERCIAL

## 2024-10-01 DIAGNOSIS — M25.512 CHRONIC LEFT SHOULDER PAIN: Primary | ICD-10-CM

## 2024-10-01 DIAGNOSIS — G89.29 CHRONIC LEFT SHOULDER PAIN: Primary | ICD-10-CM

## 2024-10-01 PROCEDURE — 97140 MANUAL THERAPY 1/> REGIONS: CPT | Performed by: PHYSICAL THERAPIST

## 2024-10-01 PROCEDURE — 97110 THERAPEUTIC EXERCISES: CPT | Performed by: PHYSICAL THERAPIST

## 2024-10-01 PROCEDURE — 97112 NEUROMUSCULAR REEDUCATION: CPT | Performed by: PHYSICAL THERAPIST

## 2024-10-01 NOTE — PROGRESS NOTES
Daily Note     Today's date: 10/1/2024  Patient name: Kalyn Joshi  : 1970  MRN: [id number]  Referring provider: Marilyn Ceron MD  Dx:   Encounter Diagnosis     ICD-10-CM    1. Chronic left shoulder pain  M25.512     G89.29                      Subjective: Patient states that she is doing well.      Objective: See treatment diary below.      Assessment:  Patient demonstrates improved glenohumeral mobility and ROM.  She has some pain during PROM c/ an empty end feel.  She tolerated exercise progressions well c/ decreased overall pain.  Tolerated treatment well. Patient would benefit from continued PT.      Plan: Continue per plan of care.      Precautions: Hx of CA    Manuals 9/24 10/1           G-H mobs AZ AZ           PROM AZ AZ                                     Neuro Re-Ed             Scap 4 2x10 5s 2x10 5s                                                  Ther Ex             UBE             Pulleys 3' 5'           Table slides FE AAROM 3x10 3x10           SL ER  3x10                                                               Ther Activity                                       Gait Training                                       Modalities              10' 10'

## 2024-10-03 ENCOUNTER — APPOINTMENT (OUTPATIENT)
Dept: PHYSICAL THERAPY | Facility: MEDICAL CENTER | Age: 54
End: 2024-10-03
Payer: COMMERCIAL

## 2024-10-04 ENCOUNTER — OFFICE VISIT (OUTPATIENT)
Dept: PHYSICAL THERAPY | Facility: MEDICAL CENTER | Age: 54
End: 2024-10-04
Payer: COMMERCIAL

## 2024-10-04 DIAGNOSIS — G89.29 CHRONIC LEFT SHOULDER PAIN: Primary | ICD-10-CM

## 2024-10-04 DIAGNOSIS — M25.512 CHRONIC LEFT SHOULDER PAIN: Primary | ICD-10-CM

## 2024-10-04 PROCEDURE — 97112 NEUROMUSCULAR REEDUCATION: CPT | Performed by: PHYSICAL THERAPIST

## 2024-10-04 PROCEDURE — 97140 MANUAL THERAPY 1/> REGIONS: CPT | Performed by: PHYSICAL THERAPIST

## 2024-10-04 PROCEDURE — 97110 THERAPEUTIC EXERCISES: CPT | Performed by: PHYSICAL THERAPIST

## 2024-10-04 NOTE — PROGRESS NOTES
Daily Note     Today's date: 10/4/2024  Patient name: Kalyn Joshi  : 1970  MRN: [id number]  Referring provider: Marilyn Ceron MD  Dx:   Encounter Diagnosis     ICD-10-CM    1. Chronic left shoulder pain  M25.512     G89.29                      Subjective: Patient states that she is doing well.       Objective: See treatment diary below.      Assessment:  Patient demonstrates good tolerance to progressions c/ decreased pain.  Tolerated treatment well. Patient would benefit from continued PT.      Plan: Continue per plan of care.      Precautions: Hx of CA    Manuals 9/24 10/1 10/4          G-H mobs AZ AZ AZ          PROM AZ AZ AZ                                    Neuro Re-Ed             Scap 4 2x10 5s 2x10 5s 3x10 5s                                                 Ther Ex             UBE             Pulleys 3' 5' 5'          Table slides FE/ER AAROM 3x10 3x10 3x10          SL ER  3x10 3x10                                                              Ther Activity                                       Gait Training                                       Modalities              10' 10' 10'

## 2024-10-08 ENCOUNTER — OFFICE VISIT (OUTPATIENT)
Dept: PHYSICAL THERAPY | Facility: MEDICAL CENTER | Age: 54
End: 2024-10-08
Payer: COMMERCIAL

## 2024-10-08 DIAGNOSIS — G89.29 CHRONIC LEFT SHOULDER PAIN: Primary | ICD-10-CM

## 2024-10-08 DIAGNOSIS — M25.512 CHRONIC LEFT SHOULDER PAIN: Primary | ICD-10-CM

## 2024-10-08 PROCEDURE — 97112 NEUROMUSCULAR REEDUCATION: CPT | Performed by: PHYSICAL THERAPIST

## 2024-10-08 PROCEDURE — 97110 THERAPEUTIC EXERCISES: CPT | Performed by: PHYSICAL THERAPIST

## 2024-10-08 PROCEDURE — 97140 MANUAL THERAPY 1/> REGIONS: CPT | Performed by: PHYSICAL THERAPIST

## 2024-10-08 NOTE — PROGRESS NOTES
Daily Note     Today's date: 10/8/2024  Patient name: Kalyn Joshi  : 1970  MRN: [id number]  Referring provider: Marilyn Ceron MD  Dx:   Encounter Diagnosis     ICD-10-CM    1. Chronic left shoulder pain  M25.512     G89.29                      Subjective: Patient states that she has been using her arm more and it is a little sore today.      Objective: See treatment diary below.      Assessment:  Patient presents c/ increased ROM, but continues c/ random pain during certain movements.  TTP t/o anterior deltoid m.  Discontinued seated ER AAROM d/t pain.  All other exercises were tolerated well.  Patient would benefit from continued PT.      Plan: Continue per plan of care.      Precautions: Hx of CA    Manuals 9/24 10/1 10/4 10/8         G-H mobs AZ AZ AZ AZ         PROM AZ AZ AZ AZ                                   Neuro Re-Ed             Scap 4 2x10 5s 2x10 5s 3x10 5s 3x10 5s                                                Ther Ex             UBE    3' F/ 3' B         Pulleys 3' 5' 5' 5'         Table slides FE AAROM 3x10 3x10 3x10 3x10          SL ER  3x10 3x10 3x10         SA punches     3x10 sup         Alph     1x sup                                   Ther Activity                                       Gait Training                                       Modalities              10' 10' 10' 10'

## 2024-10-10 ENCOUNTER — OFFICE VISIT (OUTPATIENT)
Dept: PHYSICAL THERAPY | Facility: MEDICAL CENTER | Age: 54
End: 2024-10-10
Payer: COMMERCIAL

## 2024-10-10 DIAGNOSIS — G89.29 CHRONIC LEFT SHOULDER PAIN: Primary | ICD-10-CM

## 2024-10-10 DIAGNOSIS — M25.512 CHRONIC LEFT SHOULDER PAIN: Primary | ICD-10-CM

## 2024-10-10 PROCEDURE — 97112 NEUROMUSCULAR REEDUCATION: CPT

## 2024-10-10 PROCEDURE — 97110 THERAPEUTIC EXERCISES: CPT

## 2024-10-10 PROCEDURE — 97140 MANUAL THERAPY 1/> REGIONS: CPT

## 2024-10-10 NOTE — PROGRESS NOTES
Daily Note     Today's date: 10/10/2024  Patient name: Kalyn Joshi  : 1970  MRN: [id number]  Referring provider: Marilyn Ceron MD  Dx:   Encounter Diagnosis     ICD-10-CM    1. Chronic left shoulder pain  M25.512     G89.29                      Subjective:  Patient states that she believes her pain is improving slightly. She is able to do more at home.       Objective: See treatment diary below      Assessment: Tolerated treatment well. Continued with POC. Complains of most pain into shoulder external and internal rotation. Patient demonstrated fatigue post treatment, exhibited good technique with therapeutic exercises, and would benefit from continued PT      Plan: Continue per plan of care.      Precautions: Hx of CA    Manuals 9/24 10/1 10/4 10/8 10/10        G-H mobs AZ AZ AZ AZ         PROM AZ AZ AZ AZ RA                                  Neuro Re-Ed             Scap 4 2x10 5s 2x10 5s 3x10 5s 3x10 5s 3x10 5s                                               Ther Ex             UBE    3' F/ 3' B 3' F/ 3' B        Pulleys 3' 5' 5' 5' 5'         Table slides FE AAROM 3x10 3x10 3x10 3x10  3x10        SL ER  3x10 3x10 3x10 3x10        SA punches     3x10 sup 3x10 sup        Alph     1x sup 1x sup                                  Ther Activity                                       Gait Training                                       Modalities              10' 10' 10' 10' 10'

## 2024-10-15 ENCOUNTER — APPOINTMENT (OUTPATIENT)
Dept: PHYSICAL THERAPY | Facility: MEDICAL CENTER | Age: 54
End: 2024-10-15
Payer: COMMERCIAL

## 2024-10-17 ENCOUNTER — OFFICE VISIT (OUTPATIENT)
Dept: PHYSICAL THERAPY | Facility: MEDICAL CENTER | Age: 54
End: 2024-10-17

## 2024-10-17 DIAGNOSIS — G89.29 CHRONIC LEFT SHOULDER PAIN: Primary | ICD-10-CM

## 2024-10-17 DIAGNOSIS — M25.512 CHRONIC LEFT SHOULDER PAIN: Primary | ICD-10-CM

## 2024-10-17 PROCEDURE — 97140 MANUAL THERAPY 1/> REGIONS: CPT | Performed by: PHYSICAL THERAPIST

## 2024-10-17 PROCEDURE — 97110 THERAPEUTIC EXERCISES: CPT | Performed by: PHYSICAL THERAPIST

## 2024-10-17 NOTE — PROGRESS NOTES
Daily Note     Today's date: 10/17/2024  Patient name: Kalyn Joshi  : 1970  MRN: [id number]  Referring provider: Marilyn Ceron MD  Dx:   Encounter Diagnosis     ICD-10-CM    1. Chronic left shoulder pain  M25.512     G89.29                      Subjective:  Patient states that she noticed that her left arm is in a awkward position when working after buying a new chair without arm rests.     Objective: See treatment diary below.      Assessment:  Patient presents with improved glenohumeral mobility and good tolerance to progressions without pain.  Tolerated treatment well. Patient would benefit from continued PT.      Plan: Continue per plan of care.      Precautions: Hx of CA    Manuals 9/24 10/1 10/4 10/8 10/10 10/17       G-H mobs AZ AZ AZ AZ  AZ       PROM AZ AZ AZ AZ RA AZ                                 Neuro Re-Ed             Scap 4 2x10 5s 2x10 5s 3x10 5s 3x10 5s 3x10 5s 2x10 yellow                                              Ther Ex             UBE    3' F/ 3' B 3' F/ 3' B 3' F/ 3' B       Pulleys 3' 5' 5' 5' 5'  5'       Table slides FE AAROM 3x10 3x10 3x10 3x10  3x10 3x10       SL ER  3x10 3x10 3x10 3x10 3x12       SL FE      3X10       SA punches     3x10 sup 3x10 sup 3x10 sup       Alph     1x sup 1x sup 2x sup       Prone ret      3x10       Self STM/MFR post cuff      HEP       Ther Activity                                       Gait Training                                       Modalities              10' 10' 10' 10' 10'  10'

## 2024-10-22 ENCOUNTER — OFFICE VISIT (OUTPATIENT)
Dept: PHYSICAL THERAPY | Facility: MEDICAL CENTER | Age: 54
End: 2024-10-22

## 2024-10-22 DIAGNOSIS — G89.29 CHRONIC LEFT SHOULDER PAIN: Primary | ICD-10-CM

## 2024-10-22 DIAGNOSIS — M25.512 CHRONIC LEFT SHOULDER PAIN: Primary | ICD-10-CM

## 2024-10-22 PROCEDURE — 97140 MANUAL THERAPY 1/> REGIONS: CPT | Performed by: PHYSICAL THERAPIST

## 2024-10-22 PROCEDURE — 97110 THERAPEUTIC EXERCISES: CPT | Performed by: PHYSICAL THERAPIST

## 2024-10-22 NOTE — PROGRESS NOTES
Daily Note     Today's date: 10/22/2024  Patient name: Kalyn Joshi  : 1970  MRN: [id number]  Referring provider: Marilyn Ceron MD  Dx:   Encounter Diagnosis     ICD-10-CM    1. Chronic left shoulder pain  M25.512     G89.29                      Subjective: Patient states that she has noticed a positive change.      Objective: See treatment diary below.      Assessment:  Patient demonstrates good tolerance to progressions.  She continues c/ pain t/o left deltoid region c/ ER AAROM.  No TTP or pain at rest.  She demonstrates good PROM throughout.  Tolerated treatment well. Patient would benefit from continued PT.      Plan: Continue per plan of care.      Precautions: Hx of CA    Manuals 9/24 10/1 10/4 10/8 10/10 10/17 10/22      G-H mobs AZ AZ AZ AZ  AZ AZ      PROM AZ AZ AZ AZ RA AZ AZ                                Neuro Re-Ed             Scap 4 2x10 5s 2x10 5s 3x10 5s 3x10 5s 3x10 5s 2x10 yellow 3x10 red                                             Ther Ex             UBE    3' F/ 3' B 3' F/ 3' B 3' F/ 3' B 3' F/ 3' B      Pulleys 3' 5' 5' 5' 5'  5' 5'      Table slides FE AAROM 3x10 3x10 3x10 3x10  3x10 3x10 3x10      SL ER  3x10 3x10 3x10 3x10 3x12 3x15      SL FE      3x10 3x10      SA punches     3x10 sup 3x10 sup 3x10 sup 3x12 sup      Alph     1x sup 1x sup 2x sup 3x sup      Prone ret      3x10 3x10      Self STM/MFR post cuff      HEP HEP      Ther Activity                                       Gait Training                                       Modalities              10' 10' 10' 10' 10'  10' 10'

## 2024-10-24 ENCOUNTER — APPOINTMENT (OUTPATIENT)
Dept: PHYSICAL THERAPY | Facility: MEDICAL CENTER | Age: 54
End: 2024-10-24
Payer: COMMERCIAL

## 2024-10-29 ENCOUNTER — OFFICE VISIT (OUTPATIENT)
Dept: PHYSICAL THERAPY | Facility: MEDICAL CENTER | Age: 54
End: 2024-10-29
Payer: COMMERCIAL

## 2024-10-29 DIAGNOSIS — M25.512 CHRONIC LEFT SHOULDER PAIN: Primary | ICD-10-CM

## 2024-10-29 DIAGNOSIS — G89.29 CHRONIC LEFT SHOULDER PAIN: Primary | ICD-10-CM

## 2024-10-29 PROCEDURE — 97110 THERAPEUTIC EXERCISES: CPT | Performed by: PHYSICAL THERAPIST

## 2024-10-29 PROCEDURE — 97140 MANUAL THERAPY 1/> REGIONS: CPT | Performed by: PHYSICAL THERAPIST

## 2024-10-29 NOTE — PROGRESS NOTES
Daily Note     Today's date: 10/29/2024  Patient name: Kalyn Joshi  : 1970  MRN: [id number]  Referring provider: Marilyn Ceron MD  Dx:   Encounter Diagnosis     ICD-10-CM    1. Chronic left shoulder pain  M25.512     G89.29                      Subjective: Patient states that she feels some muscle soreness and improvement, but continues to have some random pain when she is dressing and performing ADLs.      Objective: See treatment diary below.      Assessment:  Patient demonstrates left shoulder symptoms consistent c/ subacromial impingement.  She demonstrates slow improvement, but may benefit from consult c/ orthopedic for imaging and possible CSI.  She will follow up c/ Dr. Boudreaux and return to PT afterwards as needed.    Plan:  Referral sent to ortho.     Precautions: Hx of CA    Manuals 9/24 10/1 10/4 10/8 10/10 10/17 10/22 10/29     G-H mobs AZ AZ AZ AZ  AZ AZ AZ     PROM AZ AZ AZ AZ RA AZ AZ AZ                               Neuro Re-Ed             Scap 4 2x10 5s 2x10 5s 3x10 5s 3x10 5s 3x10 5s 2x10 yellow 3x10 red 3x10 red                                            Ther Ex             UBE    3' F/ 3' B 3' F/ 3' B 3' F/ 3' B 3' F/ 3' B 3' F/ 3' B     Pulleys 3' 5' 5' 5' 5'  5' 5' 5'     Table slides FE AAROM 3x10 3x10 3x10 3x10  3x10 3x10 3x10 3x10     SL ER  3x10 3x10 3x10 3x10 3x12 3x15 3x15     SL FE      3x10 3x10 3x12     SA punches     3x10 sup 3x10 sup 3x10 sup 3x12 sup 3x15 sup     Alph     1x sup 1x sup 2x sup 3x sup 3x sup     Prone ret      3x10 3x10 3x10     Self STM/MFR post cuff      HEP HEP HEP     Ther Activity                                       Gait Training                                       Modalities             MH 10' 10' 10' 10' 10'  10' 10' 10'

## 2024-10-30 ENCOUNTER — TELEPHONE (OUTPATIENT)
Age: 54
End: 2024-10-30

## 2024-10-30 NOTE — TELEPHONE ENCOUNTER
Caller: Patient     Doctor: Janusz     Reason for call: Patient asked for a call to get an estimate for self pay     Call back#: [phone]

## 2024-12-02 ENCOUNTER — OFFICE VISIT (OUTPATIENT)
Dept: OBGYN CLINIC | Facility: MEDICAL CENTER | Age: 54
End: 2024-12-02

## 2024-12-02 ENCOUNTER — APPOINTMENT (OUTPATIENT)
Dept: RADIOLOGY | Facility: MEDICAL CENTER | Age: 54
End: 2024-12-02
Payer: COMMERCIAL

## 2024-12-02 VITALS
DIASTOLIC BLOOD PRESSURE: 86 MMHG | HEART RATE: 71 BPM | BODY MASS INDEX: 21.09 KG/M2 | SYSTOLIC BLOOD PRESSURE: 125 MMHG | WEIGHT: 119 LBS | HEIGHT: 63 IN

## 2024-12-02 DIAGNOSIS — M25.512 LEFT SHOULDER PAIN, UNSPECIFIED CHRONICITY: ICD-10-CM

## 2024-12-02 DIAGNOSIS — M25.512 LEFT SHOULDER PAIN, UNSPECIFIED CHRONICITY: Primary | ICD-10-CM

## 2024-12-02 PROCEDURE — 99204 OFFICE O/P NEW MOD 45 MIN: CPT | Performed by: ORTHOPAEDIC SURGERY

## 2024-12-02 PROCEDURE — 73030 X-RAY EXAM OF SHOULDER: CPT

## 2024-12-02 NOTE — PROGRESS NOTES
"Orthopaedic Surgery - Office Note  Kalyn Joshi (54 y.o. female)   : 1970   MRN: [id number]  Encounter Date: 2024    Assessment / Plan    Possible L shoulder labrum tear  MRI of left shoulder  Activity as tolerated  Continue with HEP  Discussed that next treatment options will depend on MRI results   Follow-up:  Return for MRI review.      Chief Complaint / Date of Onset  L shoulder to elbow pain for 6 months  Injury Mechanism / Date  None  Surgery / Date  None    History of Present Illness   Kalyn Joshi is a 54 y.o. female who presents for L shoulder pain. LHD. Reports she was golfing a lot this summer. Bothered by overhead motions. No numbness or tingling. Referred by family doctor. No PMHx of surgeries or injuries. PMHx of breast cancer.     Treatment Summary  Medications / Modalities  Tylenol as needed  Bracing / Immobilization  None  Physical Therapy  Yes for 1 month  Injections  None  Prior Surgeries  None  Other Treatments  None    Employment / Current Status  Desk job    Sport / Organization / Current Status  Active       Review of Systems  Pertinent items are noted in HPI.  All other systems were reviewed and are negative.      Physical Exam  /86   Pulse 71   Ht 5' 3\" (1.6 m)   Wt 54 kg (119 lb)   LMP  (LMP Unknown)   BMI 21.08 kg/m²   Cons: Appears well.  No apparent distress.  Psych: Alert. Oriented x3.  Mood and affect normal.  Eyes: PERRLA, EOMI  Resp: Normal effort.  No audible wheezing or stridor.  CV: Palpable pulse.  No discernable arrhythmia.    Lymph:  No palpable cervical, axillary, or inguinal lymphadenopathy.  Skin: Warm.  No palpable masses.  No visible lesions.  Neuro: Normal muscle tone.  Normal and symmetric DTR's.     Left Shoulder Exam  Alignment / Posture:  Normal shoulder posture. Normal scapular position.  Inspection:  No swelling. No edema. No erythema. No ecchymosis.  Palpation:  No tenderness. No effusion.  ROM:  Shoulder . Shoulder ER 70. Shoulder " IR T4.  Strength:  Supraspinatus 5/5. Infraspinatus 4/5. Subscapularis 5/5.  Stability:  No objective shoulder instability.  Tests: (+) Neer. (+) [place]. (-) Garza.  Neurovascular:  Sensation intact in Ax/R/M/U nerve distributions. 2+ radial pulse.       Studies Reviewed  XR of left shoulder - no acute fractures or osseous abnormalities      Procedures  No procedures today.    Medical, Surgical, Family, and Social History  The patient's medical history, family history, and social history, were reviewed and updated as appropriate.    Past Medical History:   Diagnosis Date   • Anxiety    • Bacterial vaginosis    • Breast cancer (HCC)    • H/O bilateral breast implants    • History of infection due to human papilloma virus (HPV)     last assessed - 03Jun2013   • HPV (human papilloma virus) infection     cold knife endometrial curettage  today   1/20/2023   • Hx of radiation therapy    • BALDEMAR-2 gene mutation    • Moderate dysplasia of cervix (EMILI II)     last assessed - 20Dec2013   • Ovarian cyst     last assessed - 04Jun2013   • Pap smear abnormality of cervix with ASCUS favoring dysplasia     last assessed - 19Dec2013   • PONV (postoperative nausea and vomiting)    • Secondary amenorrhea    • Thrombocytosis    • Use of tamoxifen (Nolvadex)     last assessed - 09May2017   • Varicella        Past Surgical History:   Procedure Laterality Date   • BREAST LUMPECTOMY Right     last assessed - 13May2013   • BREAST LUMPECTOMY Right 05/11/2009   • BREAST LUMPECTOMY Right 06/05/2009   • BREAST RECONSTRUCTION Bilateral 10/2018    bilateral implants   • BREAST RECONSTRUCTION Right 02/2019    right implant   • CERVICAL BIOPSY N/A 1/20/2023    Procedure: COLD KNIFE CONE, ENDOMETRIAL CURETTAGE;  Surgeon: Mel Kirkland MD;  Location: [facility];  Service: Gynecology Oncology   • CERVICAL BIOPSY  W/ LOOP ELECTRODE EXCISION     • COLPOSCOPY W/ BIOPSY / CURETTAGE      Colposcopy Cervix with Biopsy(s) with Endocervical Currettage   •  CYSTOSCOPY N/A 2023    Procedure: CYSTOSCOPY;  Surgeon: Trey Yeboah MD;  Location: [facility];  Service: Gynecology Oncology   • CYSTOSCOPY N/A 11/15/2023    Procedure: Cystoscopy;  Surgeon: Trey Yeboah MD;  Location: [facility];  Service: Gynecology Oncology   • HYSTERECTOMY Bilateral 11/15/2023    Procedure: Total laparoscopic hysterectomy, bilateral salpingo-oophorectomy;  Surgeon: Trey Yeboah MD;  Location: [facility];  Service: Gynecology Oncology   • MASTECTOMY, PARTIAL Right 2009    right partial mastectomy re-excision 09   • CT LAPS ABD PRTM&OMENTUM DX W/WO SPEC BR/WA SPX N/A 2023    Procedure: LAPAROSCOPY DIAGNOSTIC, PELVIC WASHOUT AND PELVIC DRAIN PLACEMENT;  Surgeon: Trey Yeboah MD;  Location: [facility];  Service: Gynecology Oncology   • SENTINEL LYMPH NODE BIOPSY Right        Family History   Problem Relation Age of Onset   • No Known Problems Mother    • No Known Problems Father    • No Known Problems Sister    • No Known Problems Maternal Aunt    • Cancer Paternal Aunt 60   • No Known Problems Paternal Aunt    • No Known Problems Paternal Aunt    • No Known Problems Maternal Grandmother    • No Known Problems Maternal Grandfather    • Ovarian cancer Paternal Grandmother         age unknown   • No Known Problems Paternal Grandfather    • Colon polyps Cousin    • Colon cancer Cousin 50   • No Known Problems Half-Sister        Social History     Occupational History   • Not on file   Tobacco Use   • Smoking status: Former     Current packs/day: 0.00     Average packs/day: 0.3 packs/day for 26.0 years (6.5 ttl pk-yrs)     Types: Cigarettes     Start date:      Quit date: 2012     Years since quittin.9   • Smokeless tobacco: Never   • Tobacco comments:     former social smoker   Vaping Use   • Vaping status: Never Used   Substance and Sexual Activity   • Alcohol use: Yes     Alcohol/week: 2.0 standard drinks of alcohol     Types: 2 Cans of beer per  week     Comment: 4  x   weekly   • Drug use: No   • Sexual activity: Yes     Partners: Male     Birth control/protection: Implant       Allergies   Allergen Reactions   • Cephalexin Rash         Current Outpatient Medications:   •  ascorbic acid (VITAMIN C) 500 mg tablet, TAKE 1 TABLET BY MOUTH MON, WED, FRI WITH IRON TABS, Disp: 36 tablet, Rfl: 5  •  aspirin (ECOTRIN LOW STRENGTH) 81 mg EC tablet, Take 81 mg by mouth daily, Disp: , Rfl:   •  ferrous sulfate 324 (65 Fe) mg, TAKE 1 TABLET BY MOUTH ON MON, WED, FRI, Disp: 36 tablet, Rfl: 5  •  tretinoin (RETIN-A) 0.025 % cream, as needed, Disp: , Rfl:       Maggi Caldwell    Scribe Attestation      I,:  Maggi Caldwell am acting as a scribe while in the presence of the attending physician.:       I,:  Jean Boudreaux MD personally performed the services described in this documentation    as scribed in my presence.:

## 2024-12-06 ENCOUNTER — HOSPITAL ENCOUNTER (OUTPATIENT)
Dept: RADIOLOGY | Facility: IMAGING CENTER | Age: 54
End: 2024-12-06
Payer: COMMERCIAL

## 2024-12-06 DIAGNOSIS — M25.512 LEFT SHOULDER PAIN, UNSPECIFIED CHRONICITY: ICD-10-CM

## 2024-12-06 PROCEDURE — 73221 MRI JOINT UPR EXTREM W/O DYE: CPT

## 2024-12-12 ENCOUNTER — TELEPHONE (OUTPATIENT)
Dept: OBGYN CLINIC | Facility: MEDICAL CENTER | Age: 54
End: 2024-12-12

## 2024-12-13 NOTE — TELEPHONE ENCOUNTER
Caller: Rajani    Reason for call: Patient is awaiting a call from Chauncey to go over results, but her appointment today was cancelled as patient was under the impression it would just be a phone call. She was thinking it would be around her prev appt time. States it was suppose to still be around 9:30 because of her work mac.  Patient is a little upset. Please call.    Call back#: [phone]

## 2024-12-13 NOTE — TELEPHONE ENCOUNTER
Dr. Evangelista, please disregard as this was a Chauncey Anderson pt- this has been taken care of

## 2024-12-13 NOTE — TELEPHONE ENCOUNTER
I did call and speak with the patient.  I reviewed her MRI study at this time.  She will be scheduling appointment for Monday for an injection.

## 2024-12-16 ENCOUNTER — OFFICE VISIT (OUTPATIENT)
Dept: OBGYN CLINIC | Facility: MEDICAL CENTER | Age: 54
End: 2024-12-16

## 2024-12-16 VITALS
SYSTOLIC BLOOD PRESSURE: 126 MMHG | WEIGHT: 120 LBS | HEART RATE: 71 BPM | BODY MASS INDEX: 21.26 KG/M2 | DIASTOLIC BLOOD PRESSURE: 88 MMHG | HEIGHT: 63 IN

## 2024-12-16 DIAGNOSIS — M25.512 LEFT SHOULDER PAIN, UNSPECIFIED CHRONICITY: Primary | ICD-10-CM

## 2024-12-16 PROCEDURE — 20610 DRAIN/INJ JOINT/BURSA W/O US: CPT | Performed by: PHYSICIAN ASSISTANT

## 2024-12-16 PROCEDURE — 99213 OFFICE O/P EST LOW 20 MIN: CPT | Performed by: PHYSICIAN ASSISTANT

## 2024-12-16 RX ORDER — ROPIVACAINE HYDROCHLORIDE 5 MG/ML
4 INJECTION, SOLUTION EPIDURAL; INFILTRATION; PERINEURAL
Status: COMPLETED | OUTPATIENT
Start: 2024-12-16 | End: 2024-12-16

## 2024-12-16 RX ORDER — METHYLPREDNISOLONE ACETATE 40 MG/ML
1 INJECTION, SUSPENSION INTRA-ARTICULAR; INTRALESIONAL; INTRAMUSCULAR; SOFT TISSUE
Status: COMPLETED | OUTPATIENT
Start: 2024-12-16 | End: 2024-12-16

## 2024-12-16 RX ADMIN — ROPIVACAINE HYDROCHLORIDE 4 ML: 5 INJECTION, SOLUTION EPIDURAL; INFILTRATION; PERINEURAL at 09:00

## 2024-12-16 RX ADMIN — METHYLPREDNISOLONE ACETATE 1 ML: 40 INJECTION, SUSPENSION INTRA-ARTICULAR; INTRALESIONAL; INTRAMUSCULAR; SOFT TISSUE at 09:00

## 2024-12-16 NOTE — PROGRESS NOTES
Orthopaedic Surgery - Office Note  Kalyn Joshi (54 y.o. female)   : 1970   MRN: [id number]  Encounter Date: 2024    Assessment / Plan    Left shoulder supraspinatus tendinosis with low-grade interstitial tear and mild subacromial/subdeltoid bursitis     We did review the patient's MRI of left shoulder with her at today's visit.  Did discuss in detail treatment options for tendinitis/bursitis including surgical versus conservative treatments.  Patient will continue with conservative treatment at this time.  Discussion risk and benefits the patient agreed to proceed with a left shoulder subacromial bursa injection.  This was prepared and performed sterilely and tolerated well.  Continue activity as tolerated  Patient can continue with her physical therapy exercises at this time.  Follow-up:  Return for Follow-up as scheduled in about 5 weeks with Dr. Boudreaux.      Chief Complaint / Date of Onset  L shoulder to elbow pain for 6 months  Injury Mechanism / Date  None  Surgery / Date  None    History of Present Illness   Kalyn Joshi is a 54 y.o. female following up to review the MRI of her left shoulder.  She has had ongoing left shoulder pain for about 6 months to be aggravated after she was golfing frequently this summer. Bothered by overhead motions.  She did previously stop physical therapy due to the continued pain in her shoulder.  No numbness or tingling. No PMHx of surgeries or injuries. PMHx of breast cancer.     Treatment Summary  Medications / Modalities  Tylenol as needed  Bracing / Immobilization  None  Physical Therapy  For 6 weeks in 2024 with transition to home exercise program.  Injections  None  Prior Surgeries  None  Other Treatments  None    Employment / Current Status  Desk job    Sport / Organization / Current Status  Active       Review of Systems  Pertinent items are noted in HPI.  All other systems were reviewed and are negative.      Physical Exam  /88   Pulse 71  "  Ht 5' 3\" (1.6 m)   Wt 54.4 kg (120 lb)   LMP  (LMP Unknown)   BMI 21.26 kg/m²   Cons: Appears well.  No apparent distress.  Psych: Alert. Oriented x3.  Mood and affect normal.  Eyes: PERRLA, EOMI  Resp: Normal effort.  No audible wheezing or stridor.  CV: Palpable pulse.  No discernable arrhythmia.    Lymph:  No palpable cervical, axillary, or inguinal lymphadenopathy.  Skin: Warm.  No palpable masses.  No visible lesions.  Neuro: Normal muscle tone.  Normal and symmetric DTR's.     Left Shoulder Exam  Alignment / Posture:  Normal shoulder posture. Normal scapular position.  Inspection:  No swelling. No edema. No erythema. No ecchymosis.  Palpation:  No tenderness. No effusion.  ROM:  Shoulder . Shoulder ER 70. Shoulder IR T4.  Strength:  Supraspinatus 5-/5. Infraspinatus 5-/5. Subscapularis 5/5.  Stability:  No objective shoulder instability.  Tests: (+) Garza. (+) Neer. (+) McCulloch. (+) Internal impingement test. (-) Belly press. (-) Spurling.  Neurovascular:  Sensation intact in Ax/R/M/U nerve distributions. 2+ radial pulse.       Studies Reviewed  Studies reviewed today's visit.    XR of left shoulder - from 12/2/2024 no acute fractures or osseous abnormalities .  MRI study of the left shoulder-from 12/6/2024 showed mild supraspinatus tendinosis with a small low-grade interstitial tear.  No full-thickness tear.  Mild subacromial and subdeltoid bursitis.  Also multiple borderline enlarged left axillary lymph nodes measuring up to 1 cm.  The patient will follow-up with her primary care doctor in regards to the enlarged lymph nodes.      Large joint arthrocentesis: L subacromial bursa  Universal Protocol:  Consent: Verbal consent obtained.  Risks and benefits: risks, benefits and alternatives were discussed  Consent given by: patient  Patient identity confirmed: verbally with patient  Supporting Documentation  Indications: pain   Procedure Details  Location: shoulder - L subacromial bursa  Needle " size: 22 G  Ultrasound guidance: no  Approach: lateral  Medications administered: 1 mL methylPREDNISolone acetate 40 mg/mL; 4 mL ropivacaine 0.5 %    Patient tolerance: patient tolerated the procedure well with no immediate complications  Dressing:  Sterile dressing applied             Medical, Surgical, Family, and Social History  The patient's medical history, family history, and social history, were reviewed and updated as appropriate.    Past Medical History:   Diagnosis Date    Anxiety     Bacterial vaginosis     Breast cancer (HCC)     H/O bilateral breast implants     History of infection due to human papilloma virus (HPV)     last assessed - 03Jun2013    HPV (human papilloma virus) infection     cold knife endometrial curettage  today   1/20/2023    Hx of radiation therapy     BALDEMAR-2 gene mutation     Moderate dysplasia of cervix (EMILI II)     last assessed - 20Dec2013    Ovarian cyst     last assessed - 04Jun2013    Pap smear abnormality of cervix with ASCUS favoring dysplasia     last assessed - 19Dec2013    PONV (postoperative nausea and vomiting)     Secondary amenorrhea     Thrombocytosis     Use of tamoxifen (Nolvadex)     last assessed - 09May2017    Varicella        Past Surgical History:   Procedure Laterality Date    BREAST LUMPECTOMY Right     last assessed - 13May2013    BREAST LUMPECTOMY Right 05/11/2009    BREAST LUMPECTOMY Right 06/05/2009    BREAST RECONSTRUCTION Bilateral 10/2018    bilateral implants    BREAST RECONSTRUCTION Right 02/2019    right implant    CERVICAL BIOPSY N/A 1/20/2023    Procedure: COLD KNIFE CONE, ENDOMETRIAL CURETTAGE;  Surgeon: Mel Kirkland MD;  Location: [facility];  Service: Gynecology Oncology    CERVICAL BIOPSY  W/ LOOP ELECTRODE EXCISION      COLPOSCOPY W/ BIOPSY / CURETTAGE      Colposcopy Cervix with Biopsy(s) with Endocervical Currettage    CYSTOSCOPY N/A 1/25/2023    Procedure: CYSTOSCOPY;  Surgeon: Trey Yeboah MD;  [facility];  Service:  Gynecology Oncology    CYSTOSCOPY N/A 11/15/2023    Procedure: Cystoscopy;  Surgeon: Trey Yeboah MD;  [facility];  Service: Gynecology Oncology    HYSTERECTOMY Bilateral 11/15/2023    Procedure: Total laparoscopic hysterectomy, bilateral salpingo-oophorectomy;  Surgeon: Trey Yeboah MD;  Location[facility];  Service: Gynecology Oncology    MASTECTOMY, PARTIAL Right 2009    right partial mastectomy re-excision 09    KY LAPS ABD PRTM&OMENTUM DX W/WO SPEC BR/WA SPX N/A 2023    Procedure: LAPAROSCOPY DIAGNOSTIC, PELVIC WASHOUT AND PELVIC DRAIN PLACEMENT;  Surgeon: Trey Yeboah MD;  Location[facility];  Service: Gynecology Oncology    SENTINEL LYMPH NODE BIOPSY Right        Family History   Problem Relation Age of Onset    No Known Problems Mother     No Known Problems Father     No Known Problems Sister     No Known Problems Maternal Aunt     Cancer Paternal Aunt 60    No Known Problems Paternal Aunt     No Known Problems Paternal Aunt     No Known Problems Maternal Grandmother     No Known Problems Maternal Grandfather     Ovarian cancer Paternal Grandmother         age unknown    No Known Problems Paternal Grandfather     Colon polyps Cousin     Colon cancer Cousin 50    No Known Problems Half-Sister        Social History     Occupational History    Not on file   Tobacco Use    Smoking status: Former     Current packs/day: 0.00     Average packs/day: 0.3 packs/day for 26.0 years (6.5 ttl pk-yrs)     Types: Cigarettes     Start date:      Quit date:      Years since quittin.9    Smokeless tobacco: Never    Tobacco comments:     former social smoker   Vaping Use    Vaping status: Never Used   Substance and Sexual Activity    Alcohol use: Yes     Alcohol/week: 2.0 standard drinks of alcohol     Types: 2 Cans of beer per week     Comment: 4  x   weekly    Drug use: No    Sexual activity: Yes     Partners: Male     Birth control/protection: Implant        Allergies   Allergen Reactions    Cephalexin Rash         Current Outpatient Medications:     aspirin (ECOTRIN LOW STRENGTH) 81 mg EC tablet, Take 81 mg by mouth daily, Disp: , Rfl:     tretinoin (RETIN-A) 0.025 % cream, as needed, Disp: , Rfl:     ascorbic acid (VITAMIN C) 500 mg tablet, TAKE 1 TABLET BY MOUTH MON, WED, FRI WITH IRON TABS (Patient not taking: Reported on 12/16/2024), Disp: 36 tablet, Rfl: 5    ferrous sulfate 324 (65 Fe) mg, TAKE 1 TABLET BY MOUTH ON MON, WED, FRI (Patient not taking: Reported on 12/16/2024), Disp: 36 tablet, Rfl: 5      Chauncey Anderson PA-C    Scribe Attestation      I,:   am acting as a scribe while in the presence of the attending physician.:       I,:   personally performed the services described in this documentation    as scribed in my presence.:

## 2024-12-27 DIAGNOSIS — R59.0 AXILLARY ADENOPATHY: Primary | ICD-10-CM

## 2025-01-13 ENCOUNTER — HOSPITAL ENCOUNTER (OUTPATIENT)
Dept: ULTRASOUND IMAGING | Facility: CLINIC | Age: 55
Discharge: HOME | End: 2025-01-13
Payer: COMMERCIAL

## 2025-01-13 VITALS — BODY MASS INDEX: 21.26 KG/M2 | HEIGHT: 63 IN | WEIGHT: 120 LBS

## 2025-01-13 DIAGNOSIS — R59.0 AXILLARY ADENOPATHY: ICD-10-CM

## 2025-01-13 PROCEDURE — 76642 ULTRASOUND BREAST LIMITED: CPT

## 2025-01-13 NOTE — PROGRESS NOTES
Met with patient and Dr. Crawford regarding recommendation for;    _____ RIGHT ___x___LEFT      _____Ultrasound guided lymph node biopsy.      __X___Verbalized understanding.    Reviewed clip placement with patient, pt states understanding: Yes: _x___ No: ____  Comments:    Blood thinners:  No: _____ Yes: __x____ What:   asa 81 mg       Biopsy teaching sheet given:  Yes: ___X___ No: ________    Pt given contact information and adv to call with any questions/needs

## 2025-01-17 ENCOUNTER — OFFICE VISIT (OUTPATIENT)
Dept: OBGYN CLINIC | Facility: MEDICAL CENTER | Age: 55
End: 2025-01-17
Payer: COMMERCIAL

## 2025-01-17 VITALS — WEIGHT: 120 LBS | HEIGHT: 63 IN | BODY MASS INDEX: 21.26 KG/M2

## 2025-01-17 DIAGNOSIS — M75.112 NONTRAUMATIC INCOMPLETE TEAR OF LEFT ROTATOR CUFF: Primary | ICD-10-CM

## 2025-01-17 PROCEDURE — 99212 OFFICE O/P EST SF 10 MIN: CPT | Performed by: ORTHOPAEDIC SURGERY

## 2025-01-17 NOTE — PROGRESS NOTES
Orthopaedic Surgery - Office Note  Kalyn Joshi (54 y.o. female)   : 1970   MRN: [id number]  Encounter Date: 2025    Assessment / Plan  Left shoulder supraspinatus tendinosis with low-grade interstitial tear and mild subacromial/subdeltoid bursitis     Activity as tolerated  Anti-inflammatories or Tylenol prn pain  Continue with shoulder strengthening and stretching during physical therapy. Incorporate scapular strengthening and posture during physical therapy/HEP  Discussed repeat injections in the future if pain returns. Advised patient to limit cortisone injections due to potential soft tissue damage of the RTC. Recommended no sooner than 6 months from previous injection.  Follow-up:  Return if symptoms worsen or fail to improve.      Chief Complaint / Date of Onset  L shoulder to elbow pain for 6 months  Injury Mechanism / Date  None  Surgery / Date  None    History of Present Illness   Kalyn Joshi is a 54 y.o. female who presents for follow-up evaluation regarding the left shoulder. At her previous visit, she received a left shoulder CSI which she claims gave 100% relief that is ongoing. She is completing physical therapy through telehealth supplied through her work with [employer]. She started with range of motion and will soon be incorporating strengthening exercises. She denies further injury or trauma to the left shoulder. No distal numbness or tingling.     Of note, she followed-up regarding the enlarged lymph nodes found on MRI imaging from 2024 with surgical oncology in which she went for a breast axilla ultrasound.    Treatment Summary  Medications / Modalities  Tylenol as needed  Bracing / Immobilization  None  Physical Therapy  For 6 weeks in 2024 with transition to home exercise program.  Currently performing HEP   Injections  L subacromial bursa CSI on 24 - 100% relief  Prior Surgeries  None  Other Treatments  None     Employment / Current Status  Desk  "job     Sport / Organization / Current Status  Active       Review of Systems  Pertinent items are noted in HPI.  All other systems were reviewed and are negative.      Physical Exam  Ht 5' 3\" (1.6 m)   Wt 54.4 kg (120 lb)   LMP  (LMP Unknown)   BMI 21.26 kg/m²   Cons: Appears well.  No apparent distress.  Psych: Alert. Oriented x3.  Mood and affect normal.  Eyes: PERRLA, EOMI  Resp: Normal effort.  No audible wheezing or stridor.  CV: Palpable pulse.  No discernable arrhythmia.    Lymph:  No palpable cervical, axillary, or inguinal lymphadenopathy.  Skin: Warm.  No palpable masses.  No visible lesions.  Neuro: Normal muscle tone.  Normal and symmetric DTR's.     Left Shoulder Exam  Alignment / Posture:  Normal shoulder posture.  Inspection:  No swelling. No erythema. No ecchymosis.  Palpation:  No tenderness.  ROM:  Shoulder . Shoulder ER 80. Shoulder IR T4.  Strength:  Supraspinatus 5-/5. Infraspinatus 5-/5. Subscapularis 5/5.  Stability:  No objective shoulder instability.  Tests: No pertinent positive or negative tests.  Neurovascular:  Sensation intact in Ax/R/M/U nerve distributions. 2+ radial pulse.       Studies Reviewed  No studies to review      Procedures  No procedures today.    Medical, Surgical, Family, and Social History  The patient's medical history, family history, and social history, were reviewed and updated as appropriate.    Past Medical History:   Diagnosis Date   • Anxiety    • Bacterial vaginosis    • Breast cancer (HCC)    • H/O bilateral breast implants    • History of infection due to human papilloma virus (HPV)     last assessed - 03Jun2013   • HPV (human papilloma virus) infection     cold knife endometrial curettage  today   1/20/2023   • Hx of radiation therapy    • BALDEMAR-2 gene mutation    • Moderate dysplasia of cervix (EMILI II)     last assessed - 20Dec2013   • Ovarian cyst     last assessed - 04Jun2013   • Pap smear abnormality of cervix with ASCUS favoring dysplasia     " last assessed - 19Dec2013   • PONV (postoperative nausea and vomiting)    • Secondary amenorrhea    • Thrombocytosis    • Use of tamoxifen (Nolvadex)     last assessed - 09May2017   • Varicella        Past Surgical History:   Procedure Laterality Date   • BREAST LUMPECTOMY Right     last assessed - 13May2013   • BREAST LUMPECTOMY Right 05/11/2009   • BREAST LUMPECTOMY Right 06/05/2009   • BREAST RECONSTRUCTION Bilateral 10/2018    bilateral implants   • BREAST RECONSTRUCTION Right 02/2019    right implant   • CERVICAL BIOPSY N/A 1/20/2023    Procedure: COLD KNIFE CONE, ENDOMETRIAL CURETTAGE;  Surgeon: Mel Kirkland MD;  Location: [facility];  Service: Gynecology Oncology   • CERVICAL BIOPSY  W/ LOOP ELECTRODE EXCISION     • COLPOSCOPY W/ BIOPSY / CURETTAGE      Colposcopy Cervix with Biopsy(s) with Endocervical Currettage   • CYSTOSCOPY N/A 1/25/2023    Procedure: CYSTOSCOPY;  Surgeon: Trey Yeboah MD;  [facility];  Service: Gynecology Oncology   • CYSTOSCOPY N/A 11/15/2023    Procedure: Cystoscopy;  Surgeon: Trey Yeboah MD;  [facility];  Service: Gynecology Oncology   • HYSTERECTOMY Bilateral 11/15/2023    Procedure: Total laparoscopic hysterectomy, bilateral salpingo-oophorectomy;  Surgeon: Trey Yeboah MD;  [facility];  Service: Gynecology Oncology   • MASTECTOMY, PARTIAL Right 05/11/2009    right partial mastectomy re-excision 6/26/09   • CT LAPS ABD PRTM&OMENTUM DX W/WO SPEC BR/WA SPX N/A 1/25/2023    Procedure: LAPAROSCOPY DIAGNOSTIC, PELVIC WASHOUT AND PELVIC DRAIN PLACEMENT;  Surgeon: Trey Yeboah MD;  [facility];  Service: Gynecology Oncology   • SENTINEL LYMPH NODE BIOPSY Right        Family History   Problem Relation Age of Onset   • No Known Problems Mother    • No Known Problems Father    • No Known Problems Sister    • No Known Problems Maternal Aunt    • Cancer Paternal Aunt 60   • No Known Problems Paternal Aunt    • No Known Problems  Paternal Aunt    • No Known Problems Maternal Grandmother    • No Known Problems Maternal Grandfather    • Ovarian cancer Paternal Grandmother         age unknown   • No Known Problems Paternal Grandfather    • Colon polyps Cousin    • Colon cancer Cousin 50   • No Known Problems Half-Sister        Social History     Occupational History   • Not on file   Tobacco Use   • Smoking status: Former     Current packs/day: 0.00     Average packs/day: 0.3 packs/day for 26.0 years (6.5 ttl pk-yrs)     Types: Cigarettes     Start date:      Quit date:      Years since quittin.0   • Smokeless tobacco: Never   • Tobacco comments:     former social smoker   Vaping Use   • Vaping status: Never Used   Substance and Sexual Activity   • Alcohol use: Yes     Alcohol/week: 2.0 standard drinks of alcohol     Types: 2 Cans of beer per week     Comment: 4  x   weekly   • Drug use: No   • Sexual activity: Yes     Partners: Male     Birth control/protection: Implant       Allergies   Allergen Reactions   • Cephalexin Rash         Current Outpatient Medications:   •  aspirin (ECOTRIN LOW STRENGTH) 81 mg EC tablet, Take 81 mg by mouth daily, Disp: , Rfl:   •  tretinoin (RETIN-A) 0.025 % cream, as needed, Disp: , Rfl:   •  ascorbic acid (VITAMIN C) 500 mg tablet, TAKE 1 TABLET BY MOUTH MON, WED, FRI WITH IRON TABS (Patient not taking: Reported on 2024), Disp: 36 tablet, Rfl: 5  •  ferrous sulfate 324 (65 Fe) mg, TAKE 1 TABLET BY MOUTH ON MON, WED, FRI (Patient not taking: Reported on 2024), Disp: 36 tablet, Rfl: 5      Gerhard Lee    Scribe Attestation      I,:  Gerhard Lee am acting as a scribe while in the presence of the attending physician.:       I,:  Jean Boudreaux MD personally performed the services described in this documentation    as scribed in my presence.:

## 2025-01-20 ENCOUNTER — HOSPITAL ENCOUNTER (OUTPATIENT)
Dept: ULTRASOUND IMAGING | Facility: CLINIC | Age: 55
Discharge: HOME | End: 2025-01-20
Payer: COMMERCIAL

## 2025-01-20 VITALS — DIASTOLIC BLOOD PRESSURE: 84 MMHG | SYSTOLIC BLOOD PRESSURE: 134 MMHG | HEART RATE: 65 BPM

## 2025-01-20 DIAGNOSIS — R93.89 ABNORMAL ULTRASOUND: ICD-10-CM

## 2025-01-20 PROCEDURE — 88184 FLOWCYTOMETRY/ TC 1 MARKER: CPT | Performed by: SURGERY

## 2025-01-20 PROCEDURE — 88185 FLOWCYTOMETRY/TC ADD-ON: CPT | Performed by: SURGERY

## 2025-01-20 PROCEDURE — 88374 M/PHMTRC ALYS ISHQUANT/SEMIQ: CPT | Performed by: SURGERY

## 2025-01-20 PROCEDURE — 76942 ECHO GUIDE FOR BIOPSY: CPT

## 2025-01-20 RX ORDER — LIDOCAINE HYDROCHLORIDE 10 MG/ML
5 INJECTION, SOLUTION EPIDURAL; INFILTRATION; INTRACAUDAL; PERINEURAL ONCE
Status: COMPLETED | OUTPATIENT
Start: 2025-01-20 | End: 2025-01-20

## 2025-01-20 RX ADMIN — LIDOCAINE HYDROCHLORIDE 5 ML: 10 INJECTION, SOLUTION EPIDURAL; INFILTRATION; INTRACAUDAL; PERINEURAL at 11:33

## 2025-01-20 NOTE — PROGRESS NOTES
Patient arrived via:    __X___ambulatory    _____wheelchair    _____stretcher      Domestic violence screen    ___X___negative______positive    Breast Implants:    ___x____yes ________no

## 2025-01-20 NOTE — PROGRESS NOTES
Procedure type:    ___x__ultrasound guided _____stereotactic    Breast:    __x___Left _____Right    Location: Axilla    Needle: 16G    # of passes: 6  Total ( 2 in RPMI and 4 DIF)    Clip: Open Coil    Performed by: Dr. Crawford    Pressure held for 5 minutes by: Marisol Jaramillo    Steraman Strips:    ___X__yes _____no    Band aid and 2x2 guaze    __X___yes_____no    Tolerated procedure:    __X___yes _____no

## 2025-01-21 NOTE — PROGRESS NOTES
Post procedure call completed    Bleeding: _____yes __X___no    Pain: _____yes ___X___no    Redness/Swelling: ______yes ___X___no    Band aid removed: _____yes ___X__no (discussed removing when she showers)    Steri-Strips intact: ___X___yes _____no (discussed with patient to remove steri strips on 1/25/2025  if they have not come off on their own)    Pt with no questions at this time, adv will call when results available, adv to call with any questions or concerns, has name/# for contact

## 2025-01-22 LAB — SCAN RESULT: NORMAL

## 2025-01-31 ENCOUNTER — TELEPHONE (OUTPATIENT)
Dept: HEMATOLOGY ONCOLOGY | Facility: CLINIC | Age: 55
End: 2025-01-31

## 2025-01-31 DIAGNOSIS — C50.911 MALIGNANT NEOPLASM OF RIGHT FEMALE BREAST, UNSPECIFIED ESTROGEN RECEPTOR STATUS, UNSPECIFIED SITE OF BREAST (HCC): ICD-10-CM

## 2025-01-31 DIAGNOSIS — C85.14 B-CELL LYMPHOMA OF LYMPH NODES OF AXILLA, UNSPECIFIED B-CELL LYMPHOMA TYPE (HCC): ICD-10-CM

## 2025-01-31 DIAGNOSIS — C85.14 B-CELL LYMPHOMA OF LYMPH NODES OF AXILLA, UNSPECIFIED B-CELL LYMPHOMA TYPE (HCC): Primary | ICD-10-CM

## 2025-01-31 DIAGNOSIS — C50.819 MALIGNANT NEOPLASM OF OVERLAPPING SITES OF BREAST IN FEMALE, ESTROGEN RECEPTOR POSITIVE, UNSPECIFIED LATERALITY (HCC): ICD-10-CM

## 2025-01-31 DIAGNOSIS — Z17.0 MALIGNANT NEOPLASM OF OVERLAPPING SITES OF BREAST IN FEMALE, ESTROGEN RECEPTOR POSITIVE, UNSPECIFIED LATERALITY (HCC): ICD-10-CM

## 2025-01-31 DIAGNOSIS — D47.3 ESSENTIAL THROMBOCYTOSIS (HCC): Primary | ICD-10-CM

## 2025-01-31 DIAGNOSIS — C91.10 CLL (CHRONIC LYMPHOCYTIC LEUKEMIA) (HCC): ICD-10-CM

## 2025-01-31 DIAGNOSIS — R16.1 SPLENOMEGALY: ICD-10-CM

## 2025-01-31 LAB — SCAN RESULT: NORMAL

## 2025-01-31 NOTE — TELEPHONE ENCOUNTER
Called and spoke with patient regarding recent biopsy results, Left axillary lymph node showed CD5 positive B-cell population.  Flow differential showed lymphocytes 97%, additional ancillary testing is pending for further characterization.  She denies any B symptoms including fatigue, drenching night sweats, fevers, weight loss.  We ordered a PET/CT and labs including CBC with differential, CMP, LDH, and uric acid.  Patient has an upcoming appointment on March 19, 2025 with me she is willing to see Dr. Mcclain.  Patient verbalized understanding and appreciated the call.

## 2025-02-03 ENCOUNTER — TELEPHONE (OUTPATIENT)
Dept: HEMATOLOGY ONCOLOGY | Facility: CLINIC | Age: 55
End: 2025-02-03

## 2025-02-03 NOTE — TELEPHONE ENCOUNTER
Called patient to get her rescheduled for her upcoming appointment. Patient idalia;pebbles confirmed March 5, 2025 @ 11 am.      Thank you

## 2025-02-04 ENCOUNTER — APPOINTMENT (OUTPATIENT)
Dept: LAB | Facility: MEDICAL CENTER | Age: 55
End: 2025-02-04
Payer: COMMERCIAL

## 2025-02-04 DIAGNOSIS — C85.14 B-CELL LYMPHOMA OF LYMPH NODES OF AXILLA, UNSPECIFIED B-CELL LYMPHOMA TYPE (HCC): ICD-10-CM

## 2025-02-04 DIAGNOSIS — D50.8 IRON DEFICIENCY ANEMIA SECONDARY TO INADEQUATE DIETARY IRON INTAKE: ICD-10-CM

## 2025-02-04 DIAGNOSIS — R16.1 SPLENOMEGALY: ICD-10-CM

## 2025-02-04 DIAGNOSIS — D47.3 ESSENTIAL THROMBOCYTOSIS (HCC): ICD-10-CM

## 2025-02-04 LAB
ALBUMIN SERPL BCG-MCNC: 4.3 G/DL (ref 3.5–5)
ALP SERPL-CCNC: 58 U/L (ref 34–104)
ALT SERPL W P-5'-P-CCNC: 11 U/L (ref 7–52)
ANION GAP SERPL CALCULATED.3IONS-SCNC: 9 MMOL/L (ref 4–13)
AST SERPL W P-5'-P-CCNC: 20 U/L (ref 13–39)
BASOPHILS # BLD AUTO: 0.13 THOUSANDS/ΜL (ref 0–0.1)
BASOPHILS NFR BLD AUTO: 2 % (ref 0–1)
BILIRUB SERPL-MCNC: 0.99 MG/DL (ref 0.2–1)
BUN SERPL-MCNC: 17 MG/DL (ref 5–25)
CALCIUM SERPL-MCNC: 9.4 MG/DL (ref 8.4–10.2)
CHLORIDE SERPL-SCNC: 105 MMOL/L (ref 96–108)
CO2 SERPL-SCNC: 27 MMOL/L (ref 21–32)
CREAT SERPL-MCNC: 0.8 MG/DL (ref 0.6–1.3)
EOSINOPHIL # BLD AUTO: 0.25 THOUSAND/ΜL (ref 0–0.61)
EOSINOPHIL NFR BLD AUTO: 4 % (ref 0–6)
ERYTHROCYTE [DISTWIDTH] IN BLOOD BY AUTOMATED COUNT: 15.6 % (ref 11.6–15.1)
FERRITIN SERPL-MCNC: 42 NG/ML (ref 11–307)
GFR SERPL CREATININE-BSD FRML MDRD: 85 ML/MIN/1.73SQ M
GLUCOSE P FAST SERPL-MCNC: 81 MG/DL (ref 65–99)
HCT VFR BLD AUTO: 48.4 % (ref 34.8–46.1)
HGB BLD-MCNC: 15.1 G/DL (ref 11.5–15.4)
IMM GRANULOCYTES # BLD AUTO: 0.05 THOUSAND/UL (ref 0–0.2)
IMM GRANULOCYTES NFR BLD AUTO: 1 % (ref 0–2)
IRON SATN MFR SERPL: 30 % (ref 15–50)
IRON SERPL-MCNC: 89 UG/DL (ref 50–212)
LDH SERPL-CCNC: 262 U/L (ref 140–271)
LYMPHOCYTES # BLD AUTO: 1.69 THOUSANDS/ΜL (ref 0.6–4.47)
LYMPHOCYTES NFR BLD AUTO: 25 % (ref 14–44)
MCH RBC QN AUTO: 27.9 PG (ref 26.8–34.3)
MCHC RBC AUTO-ENTMCNC: 31.2 G/DL (ref 31.4–37.4)
MCV RBC AUTO: 90 FL (ref 82–98)
MONOCYTES # BLD AUTO: 0.54 THOUSAND/ΜL (ref 0.17–1.22)
MONOCYTES NFR BLD AUTO: 8 % (ref 4–12)
NEUTROPHILS # BLD AUTO: 3.99 THOUSANDS/ΜL (ref 1.85–7.62)
NEUTS SEG NFR BLD AUTO: 60 % (ref 43–75)
NRBC BLD AUTO-RTO: 0 /100 WBCS
PLATELET # BLD AUTO: 645 THOUSANDS/UL (ref 149–390)
PMV BLD AUTO: 10 FL (ref 8.9–12.7)
POTASSIUM SERPL-SCNC: 4.1 MMOL/L (ref 3.5–5.3)
PROT SERPL-MCNC: 6.2 G/DL (ref 6.4–8.4)
RBC # BLD AUTO: 5.41 MILLION/UL (ref 3.81–5.12)
SODIUM SERPL-SCNC: 141 MMOL/L (ref 135–147)
TIBC SERPL-MCNC: 294 UG/DL (ref 250–450)
TRANSFERRIN SERPL-MCNC: 210 MG/DL (ref 203–362)
UIBC SERPL-MCNC: 205 UG/DL (ref 155–355)
URATE SERPL-MCNC: 3 MG/DL (ref 2–7.5)
WBC # BLD AUTO: 6.65 THOUSAND/UL (ref 4.31–10.16)

## 2025-02-04 PROCEDURE — 82728 ASSAY OF FERRITIN: CPT

## 2025-02-04 PROCEDURE — 85025 COMPLETE CBC W/AUTO DIFF WBC: CPT

## 2025-02-04 PROCEDURE — 83540 ASSAY OF IRON: CPT

## 2025-02-04 PROCEDURE — 36415 COLL VENOUS BLD VENIPUNCTURE: CPT

## 2025-02-04 PROCEDURE — 83615 LACTATE (LD) (LDH) ENZYME: CPT

## 2025-02-04 PROCEDURE — 83550 IRON BINDING TEST: CPT

## 2025-02-04 PROCEDURE — 84550 ASSAY OF BLOOD/URIC ACID: CPT

## 2025-02-04 PROCEDURE — 80053 COMPREHEN METABOLIC PANEL: CPT

## 2025-02-06 ENCOUNTER — HOSPITAL ENCOUNTER (OUTPATIENT)
Dept: NUCLEAR MEDICINE | Facility: HOSPITAL | Age: 55
End: 2025-02-06
Attending: INTERNAL MEDICINE
Payer: COMMERCIAL

## 2025-02-06 DIAGNOSIS — C50.911 MALIGNANT NEOPLASM OF RIGHT FEMALE BREAST, UNSPECIFIED ESTROGEN RECEPTOR STATUS, UNSPECIFIED SITE OF BREAST (HCC): ICD-10-CM

## 2025-02-06 DIAGNOSIS — C85.14 B-CELL LYMPHOMA OF LYMPH NODES OF AXILLA, UNSPECIFIED B-CELL LYMPHOMA TYPE (HCC): ICD-10-CM

## 2025-02-06 LAB — GLUCOSE SERPL-MCNC: 94 MG/DL (ref 65–140)

## 2025-02-06 PROCEDURE — A9552 F18 FDG: HCPCS

## 2025-02-06 PROCEDURE — 82948 REAGENT STRIP/BLOOD GLUCOSE: CPT

## 2025-02-06 PROCEDURE — 78815 PET IMAGE W/CT SKULL-THIGH: CPT

## 2025-02-11 ENCOUNTER — TELEPHONE (OUTPATIENT)
Dept: HEMATOLOGY ONCOLOGY | Facility: CLINIC | Age: 55
End: 2025-02-11

## 2025-02-11 NOTE — TELEPHONE ENCOUNTER
Called and spoke with patient to review PET/CT results.  Mildly prominent bilateral axillary and subpectoral nodes with mild FDG uptake corresponding with history of lymphoma.  SUV 2.7 in the largest right axillary node 1.3 x 0.9 cm.  Stable splenomegaly with mild homogenous FDG activity.  There is mildly prominent but relatively homogenous diffuse marrow activity nonspecific.  Previous biopsy was a core biopsy on left axillary lymph node.  I will review with Dr. Mcclain whether we need additional tissue for diagnosis.  Patient has an upcoming appointment with him on February 27, 2025.

## 2025-02-27 ENCOUNTER — OFFICE VISIT (OUTPATIENT)
Dept: HEMATOLOGY ONCOLOGY | Facility: CLINIC | Age: 55
End: 2025-02-27
Payer: COMMERCIAL

## 2025-02-27 VITALS
HEART RATE: 79 BPM | OXYGEN SATURATION: 98 % | RESPIRATION RATE: 17 BRPM | DIASTOLIC BLOOD PRESSURE: 80 MMHG | BODY MASS INDEX: 21.35 KG/M2 | HEIGHT: 63 IN | TEMPERATURE: 98.2 F | SYSTOLIC BLOOD PRESSURE: 130 MMHG | WEIGHT: 120.5 LBS

## 2025-02-27 DIAGNOSIS — D47.3 ESSENTIAL THROMBOCYTOSIS (HCC): Primary | ICD-10-CM

## 2025-02-27 DIAGNOSIS — C83.00 SMALL LYMPHOCYTIC LYMPHOMA (HCC): ICD-10-CM

## 2025-02-27 PROCEDURE — 99215 OFFICE O/P EST HI 40 MIN: CPT | Performed by: INTERNAL MEDICINE

## 2025-02-27 NOTE — PROGRESS NOTES
Name: Kalyn Joshi      : 1970      MRN: [id number]  Encounter Provider: Sarkis Mcclain DO  Encounter Date: 2025   Encounter department: [facility] [place]  :  Assessment & Plan  Essential thrombocytosis (HCC)  Counts are stable.  Continue aspirin 81 mg p.o. daily.  Orders:  •  CBC and differential; Future  •  Comprehensive metabolic panel; Future    Small lymphocytic lymphoma (HCC)  See HPI.  I believe she has Lugano stage II SLL.  Hypermetabolism and splenomegaly on PET/CT could be reflective of this lymphoproliferative disorder or, alternatively, background essential thrombocytosis.  In either event, recommendation is for observation.  She is essentially asymptomatic from this condition with no anemia, thrombocytopenia, B symptoms.  We reviewed that when treatment is indicated it generally is effective at disease control.  Remissions tend to last for years.  Treatment is generally well-tolerated.  Disease is not considered to be curable, however.  Based on all of this observation is recommended consistent with NCCN guidelines.           Return in about 3 months (around 2025) for Labs - See orders.    History of Present Illness   Chief Complaint   Patient presents with   • Follow-up     Oncology History   Cancer Staging   Personal history of adenocarcinoma of breast  Staging form: Breast, AJCC 7th Edition  - Pathologic: Stage IA (T1b, N0, cM0) - Signed by Mari Clifton MD on 2018  Laterality: Right  Histologic grade (G): G2  Estrogen receptor status: Positive  Progesterone receptor status: Positive  HER2 status: Negative  Oncology History Overview Note   2018 - observation with 81 mg ASA daily     Personal history of adenocarcinoma of breast   2009 Initial Diagnosis    Invasive ductal carcinoma of breast, female, right (HCC)     2009 - 2009 Cancer Staged     T1 B. N0, ER/[place] positive, HER-2 negative, infiltrating ductal carcinoma.   Stage IA,  right     5/11/2009 Surgery    Right partial mastectomy, SLNB, breast implant.  Dr Ca     5/2009 Genetic Testing    BRCA negative     2009 - 2009 Radiation    WBRT.  Dr Dao     6/2009 - 6/2014 Hormone Therapy    Tamoxifen     Essential thrombocytosis (HCC)   6/13/2018 Initial Diagnosis    In the past, Plt count had increased and return to normal.  Pt was screened for ET with Jorge mutation and was negative.      Then in June 2018, platelet count elevated again and mutation testing was completed.      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    BCR-aBL was negative via FISH.      Small lymphocytic lymphoma (HCC)   2/27/2025 Initial Diagnosis    Small lymphocytic lymphoma (HCC)     2/27/2025 -  Cancer Staged    Staging form: Chronic Lymphocytic Leukemia, AJCC 8th Edition  - Clinical stage from 2/27/2025: Modified Adkins Stage I (Modified Adkins risk: Intermediate, Lugano: Stage II, Lymphocytosis: Absent, Adenopathy: Present, Organomegaly: Present, Anemia: Absent, Thrombocytopenia: Absent) - Signed by Sarkis Mcclain DO on 2/27/2025  Stage prefix: Initial diagnosis          Pertinent Medical History     2009 T1b N0 ER/[place] positive HER2 negative infiltrating ductal carcinoma right breast, stage Ia.  Lumpectomy, RT.  Tamoxifen ending 2014.    JAK2 mutated essential thrombocytosis.  Diagnosed September 2018.  Aspirin 81 mg p.o. daily.    December 6, 2024 left shoulder MRI showed supraspinatus tendinosis and regional bursitis.  Left axillary lymph nodes up to 1 cm noted.  January 20, 2025 biopsy of left axillary lymph node showed no evidence of breast cancer.  CD5 positive B-cell population consistent with SLL was noted. Del 13 q.  WBC 6.6, hemoglobin 15.1, platelet count 645, normal differential.  CMP shows total protein 6.2, otherwise normal.  LDH and uric acid both normal.  February 6, 2025 PET/CT showed mildly prominent bilateral axillary and subpectoral nodes, SUVs up to 2.7.  Stable  "splenomegaly, 17 cm, SUV 2.8.       Review of Systems   Constitutional:  Negative for appetite change, diaphoresis, fatigue and fever.   HENT:  Negative for sinus pain.    Eyes:  Negative for discharge.   Respiratory:  Negative for cough and shortness of breath.    Cardiovascular:  Negative for chest pain.   Gastrointestinal:  Negative for abdominal pain, constipation and diarrhea.   Endocrine: Negative for cold intolerance.   Genitourinary:  Negative for difficulty urinating and hematuria.   Musculoskeletal:  Negative for joint swelling.   Skin:  Negative for rash.   Allergic/Immunologic: Negative for environmental allergies.   Neurological:  Negative for dizziness and headaches.   Hematological:  Negative for adenopathy.   Psychiatric/Behavioral:  Negative for agitation.            Objective   /80 (BP Location: Left arm, Patient Position: Sitting, Cuff Size: Standard)   Pulse 79   Temp 98.2 °F (36.8 °C) (Temporal)   Resp 17   Ht 5' 3\" (1.6 m)   Wt 54.7 kg (120 lb 8 oz)   LMP  (LMP Unknown)   SpO2 98%   BMI 21.35 kg/m²     Pain Screening:  Pain Score: 0-No pain  ECOG ECOG Performance Status: 0 - Fully active, able to carry on all pre-disease performance without restriction   Physical Exam  Constitutional:       Appearance: She is well-developed.   HENT:      Head: Normocephalic and atraumatic.   Eyes:      Pupils: Pupils are equal, round, and reactive to light.   Cardiovascular:      Rate and Rhythm: Normal rate.      Heart sounds: No murmur heard.  Pulmonary:      Effort: No respiratory distress.      Breath sounds: No wheezing or rales.   Abdominal:      General: There is no distension.      Palpations: Abdomen is soft.      Tenderness: There is no abdominal tenderness. There is no rebound.   Musculoskeletal:         General: No tenderness.      Cervical back: Neck supple.   Lymphadenopathy:      Cervical: No cervical adenopathy.   Skin:     General: Skin is warm.      Findings: No rash. "   Neurological:      Mental Status: She is alert and oriented to person, place, and time.      Deep Tendon Reflexes: Reflexes normal.   Psychiatric:         Thought Content: Thought content normal.         Labs: I have reviewed the following labs:  Lab Results   Component Value Date/Time    WBC 6.65 02/04/2025 07:58 AM    RBC 5.41 (H) 02/04/2025 07:58 AM    Hemoglobin 15.1 02/04/2025 07:58 AM    Hematocrit 48.4 (H) 02/04/2025 07:58 AM    MCV 90 02/04/2025 07:58 AM    MCH 27.9 02/04/2025 07:58 AM    RDW 15.6 (H) 02/04/2025 07:58 AM    Platelets 645 (H) 02/04/2025 07:58 AM    Segmented % 60 02/04/2025 07:58 AM    Lymphocytes % 25 02/04/2025 07:58 AM    Monocytes % 8 02/04/2025 07:58 AM    Eosinophils Relative 4 02/04/2025 07:58 AM    Basophils Relative 2 (H) 02/04/2025 07:58 AM    Immature Grans % 1 02/04/2025 07:58 AM    Absolute Neutrophils 3.99 02/04/2025 07:58 AM     Lab Results   Component Value Date/Time    Potassium 4.1 02/04/2025 07:58 AM    Chloride 105 02/04/2025 07:58 AM    CO2 27 02/04/2025 07:58 AM    BUN 17 02/04/2025 07:58 AM    Creatinine 0.80 02/04/2025 07:58 AM    Glucose, Fasting 81 02/04/2025 07:58 AM    Calcium 9.4 02/04/2025 07:58 AM    AST 20 02/04/2025 07:58 AM    ALT 11 02/04/2025 07:58 AM    Alkaline Phosphatase 58 02/04/2025 07:58 AM    Total Protein 6.2 (L) 02/04/2025 07:58 AM    Albumin 4.3 02/04/2025 07:58 AM    Total Bilirubin 0.99 02/04/2025 07:58 AM    eGFR 85 02/04/2025 07:58 AM

## 2025-02-27 NOTE — ASSESSMENT & PLAN NOTE
Counts are stable.  Continue aspirin 81 mg p.o. daily.  Orders:  •  CBC and differential; Future  •  Comprehensive metabolic panel; Future

## 2025-02-27 NOTE — ASSESSMENT & PLAN NOTE
See HPI.  I believe she has Lugano stage II SLL.  Hypermetabolism and splenomegaly on PET/CT could be reflective of this lymphoproliferative disorder or, alternatively, background essential thrombocytosis.  In either event, recommendation is for observation.  She is essentially asymptomatic from this condition with no anemia, thrombocytopenia, B symptoms.  We reviewed that when treatment is indicated it generally is effective at disease control.  Remissions tend to last for years.  Treatment is generally well-tolerated.  Disease is not considered to be curable, however.  Based on all of this observation is recommended consistent with NCCN guidelines.

## 2025-02-27 NOTE — LETTER
2025     Marilyn Ceron MD  [address]  [address]  [place] [address]    Patient: Kalyn Joshi   YOB: 1970   Date of Visit: 2025       Dear Dr. Ceron:    Thank you for referring Kalyn Joshi to me for evaluation. Below are my notes for this consultation.    If you have questions, please do not hesitate to call me. I look forward to following your patient along with you.         Sincerely,        Sarkis Mcclain DO        CC: MD Sarkis New DO  2025  5:29 PM  Incomplete  Name: Kalyn Joshi      : 1970      MRN: [id number]  Encounter Provider: Sarkis Mcclain DO  Encounter Date: 2025   Encounter department: [facility] [place]  :  Assessment & Plan  Essential thrombocytosis (HCC)    Orders:    CBC and differential; Future    Comprehensive metabolic panel; Future    Small lymphocytic lymphoma (HCC)  See HPI.  I believe she has Lugano stage II SLL.  Hypermetabolism and splenomegaly on PET/CT could be reflective of this lymphoproliferative disorder or, alternatively, background essential thrombocytosis.  In either event, recommendation is for observation.  She is essentially asymptomatic from this condition with no anemia, thrombocytopenia, B symptoms.  We reviewed that when treatment is indicated it generally is effective at disease control.  Remissions tend to last for years.  Treatment is generally well-tolerated.  Disease is not considered to be curable, however.  Based on all of this observation is recommended consistent with NCCN guidelines.           Return in about 3 months (around 2025) for Labs - See orders.    History of Present Illness{?Quick Links Encounters * My Last Note * Last Note in Specialty * Snapshot * Since Last Visit * History :29871}  Chief Complaint   Patient presents with    Follow-up     Oncology History  Cancer Staging   Personal history of adenocarcinoma of  breast  Staging form: Breast, AJCC 7th Edition  - Pathologic: Stage IA (T1b, N0, cM0) - Signed by Mari Clifton MD on 5/21/2018  Laterality: Right  Histologic grade (G): G2  Estrogen receptor status: Positive  Progesterone receptor status: Positive  HER2 status: Negative  Oncology History Overview Note   9/2018 - observation with 81 mg ASA daily     Personal history of adenocarcinoma of breast   4/2009 Initial Diagnosis    Invasive ductal carcinoma of breast, female, right (HCC)     4/2009 - 5/2009 Cancer Staged     T1 B. N0, ER/[place] positive, HER-2 negative, infiltrating ductal carcinoma.   Stage IA, right     5/11/2009 Surgery    Right partial mastectomy, SLNB, breast implant.  Dr Ca     5/2009 Genetic Testing    BRCA negative     2009 - 2009 Radiation    WBRT.  Dr Dao     6/2009 - 6/2014 Hormone Therapy    Tamoxifen     Essential thrombocytosis (HCC)   6/13/2018 Initial Diagnosis    In the past, Plt count had increased and return to normal.  Pt was screened for ET with Jorge mutation and was negative.      Then in June 2018, platelet count elevated again and mutation testing was completed.      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    BCR-aBL was negative via FISH.      Small lymphocytic lymphoma (HCC)   2/27/2025 Initial Diagnosis    Small lymphocytic lymphoma (HCC)     2/27/2025 -  Cancer Staged    Staging form: Chronic Lymphocytic Leukemia, AJCC 8th Edition  - Clinical stage from 2/27/2025: Modified Adkins Stage I (Modified Adkins risk: Intermediate, Lugano: Stage II, Lymphocytosis: Absent, Adenopathy: Present, Organomegaly: Present, Anemia: Absent, Thrombocytopenia: Absent) - Signed by Sarkis Mcclain DO on 2/27/2025  Stage prefix: Initial diagnosis          Pertinent Medical History    2009 T1b N0 ER/[place] positive HER2 negative infiltrating ductal carcinoma right breast, stage Ia.  Lumpectomy, RT.  Tamoxifen ending 2014.    JAK2 mutated essential thrombocytosis.  Diagnosed  "September 2018.  Aspirin 81 mg p.o. daily.    December 6, 2024 left shoulder MRI showed supraspinatus tendinosis and regional bursitis.  Left axillary lymph nodes up to 1 cm noted.  January 20, 2025 biopsy of left axillary lymph node showed no evidence of breast cancer.  CD5 positive B-cell population consistent with SLL was noted. Del 13 q.  WBC 6.6, hemoglobin 15.1, platelet count 645, normal differential.  CMP shows total protein 6.2, otherwise normal.  LDH and uric acid both normal.  February 6, 2025 PET/CT showed mildly prominent bilateral axillary and subpectoral nodes, SUVs up to 2.7.  Stable splenomegaly, 17 cm, SUV 2.8.       Review of Systems   Constitutional:  Negative for appetite change, diaphoresis, fatigue and fever.   HENT:  Negative for sinus pain.    Eyes:  Negative for discharge.   Respiratory:  Negative for cough and shortness of breath.    Cardiovascular:  Negative for chest pain.   Gastrointestinal:  Negative for abdominal pain, constipation and diarrhea.   Endocrine: Negative for cold intolerance.   Genitourinary:  Negative for difficulty urinating and hematuria.   Musculoskeletal:  Negative for joint swelling.   Skin:  Negative for rash.   Allergic/Immunologic: Negative for environmental allergies.   Neurological:  Negative for dizziness and headaches.   Hematological:  Negative for adenopathy.   Psychiatric/Behavioral:  Negative for agitation.      {Select to insert medical history sections (Optional):30385}      Objective{?Quick Links Trend Vitals * Enter New Vitals * Results Review * Timeline (Adult) * Labs * Imaging * Cardiology * Procedures * Lung Cancer Screening * Surgical eConsent :42420}  /80 (BP Location: Left arm, Patient Position: Sitting, Cuff Size: Standard)   Pulse 79   Temp 98.2 °F (36.8 °C) (Temporal)   Resp 17   Ht 5' 3\" (1.6 m)   Wt 54.7 kg (120 lb 8 oz)   LMP  (LMP Unknown)   SpO2 98%   BMI 21.35 kg/m²     Pain Screening:  Pain Score: 0-No pain  ECOG   "   Physical Exam  Constitutional:       Appearance: She is well-developed.   HENT:      Head: Normocephalic and atraumatic.   Eyes:      Pupils: Pupils are equal, round, and reactive to light.   Cardiovascular:      Rate and Rhythm: Normal rate.      Heart sounds: No murmur heard.  Pulmonary:      Effort: No respiratory distress.      Breath sounds: No wheezing or rales.   Abdominal:      General: There is no distension.      Palpations: Abdomen is soft.      Tenderness: There is no abdominal tenderness. There is no rebound.   Musculoskeletal:         General: No tenderness.      Cervical back: Neck supple.   Lymphadenopathy:      Cervical: No cervical adenopathy.   Skin:     General: Skin is warm.      Findings: No rash.   Neurological:      Mental Status: She is alert and oriented to person, place, and time.      Deep Tendon Reflexes: Reflexes normal.   Psychiatric:         Thought Content: Thought content normal.         Labs: I have reviewed the following labs:  Lab Results   Component Value Date/Time    WBC 6.65 02/04/2025 07:58 AM    RBC 5.41 (H) 02/04/2025 07:58 AM    Hemoglobin 15.1 02/04/2025 07:58 AM    Hematocrit 48.4 (H) 02/04/2025 07:58 AM    MCV 90 02/04/2025 07:58 AM    MCH 27.9 02/04/2025 07:58 AM    RDW 15.6 (H) 02/04/2025 07:58 AM    Platelets 645 (H) 02/04/2025 07:58 AM    Segmented % 60 02/04/2025 07:58 AM    Lymphocytes % 25 02/04/2025 07:58 AM    Monocytes % 8 02/04/2025 07:58 AM    Eosinophils Relative 4 02/04/2025 07:58 AM    Basophils Relative 2 (H) 02/04/2025 07:58 AM    Immature Grans % 1 02/04/2025 07:58 AM    Absolute Neutrophils 3.99 02/04/2025 07:58 AM     Lab Results   Component Value Date/Time    Potassium 4.1 02/04/2025 07:58 AM    Chloride 105 02/04/2025 07:58 AM    CO2 27 02/04/2025 07:58 AM    BUN 17 02/04/2025 07:58 AM    Creatinine 0.80 02/04/2025 07:58 AM    Glucose, Fasting 81 02/04/2025 07:58 AM    Calcium 9.4 02/04/2025 07:58 AM    AST 20 02/04/2025 07:58 AM    ALT 11  2025 07:58 AM    Alkaline Phosphatase 58 2025 07:58 AM    Total Protein 6.2 (L) 2025 07:58 AM    Albumin 4.3 2025 07:58 AM    Total Bilirubin 0.99 2025 07:58 AM    eGFR 85 2025 07:58 AM   {SL AMB ONCOLOGY LABS (Optional):67281}    {Radiology Results Review (Optional):26572}    {Administrative / Billing Section (Optional):49459}    Sarkis Mcclain DO  2025  5:29 PM  Sign when Signing Visit  Name: Kalyn Joshi      : 1970      MRN: [id number]  Encounter Provider: Sarkis Mcclain DO  Encounter Date: 2025   Encounter department: [facility] [place]  :  Assessment & Plan  Essential thrombocytosis (HCC)    Orders:    CBC and differential; Future    Comprehensive metabolic panel; Future    Small lymphocytic lymphoma (HCC)  See HPI.  I believe she has Lugano stage II SLL.  Hypermetabolism and splenomegaly on PET/CT could be reflective of this lymphoproliferative disorder or, alternatively, background essential thrombocytosis.  In either event, recommendation is for observation.  She is essentially asymptomatic from this condition with no anemia, thrombocytopenia, B symptoms.  We reviewed that when treatment is indicated it generally is effective at disease control.  Remissions tend to last for years.  Treatment is generally well-tolerated.  Disease is not considered to be curable, however.  Based on all of this observation is recommended consistent with NCCN guidelines.           Return in about 3 months (around 2025) for Labs - See orders.    History of Present Illness{?Quick Links Encounters * My Last Note * Last Note in Specialty * Snapshot * Since Last Visit * History :93896}  Chief Complaint   Patient presents with    Follow-up     Oncology History  Cancer Staging   Personal history of adenocarcinoma of breast  Staging form: Breast, AJCC 7th Edition  - Pathologic: Stage IA (T1b, N0, cM0) - Signed by Mari Clifton MD on  5/21/2018  Laterality: Right  Histologic grade (G): G2  Estrogen receptor status: Positive  Progesterone receptor status: Positive  HER2 status: Negative  Oncology History Overview Note   9/2018 - observation with 81 mg ASA daily     Personal history of adenocarcinoma of breast   4/2009 Initial Diagnosis    Invasive ductal carcinoma of breast, female, right (HCC)     4/2009 - 5/2009 Cancer Staged     T1 B. N0, ER/[place] positive, HER-2 negative, infiltrating ductal carcinoma.   Stage IA, right     5/11/2009 Surgery    Right partial mastectomy, SLNB, breast implant.  Dr Ca     5/2009 Genetic Testing    BRCA negative     2009 - 2009 Radiation    WBRT.  Dr Dao     6/2009 - 6/2014 Hormone Therapy    Tamoxifen     Essential thrombocytosis (HCC)   6/13/2018 Initial Diagnosis    In the past, Plt count had increased and return to normal.  Pt was screened for ET with Jorge mutation and was negative.      Then in June 2018, platelet count elevated again and mutation testing was completed.      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    BCR-aBL was negative via FISH.      Small lymphocytic lymphoma (HCC)   2/27/2025 Initial Diagnosis    Small lymphocytic lymphoma (HCC)     2/27/2025 -  Cancer Staged    Staging form: Chronic Lymphocytic Leukemia, AJCC 8th Edition  - Clinical stage from 2/27/2025: Modified Adkins Stage I (Modified Adkins risk: Intermediate, Lugano: Stage II, Lymphocytosis: Absent, Adenopathy: Present, Organomegaly: Present, Anemia: Absent, Thrombocytopenia: Absent) - Signed by Sarkis Mcclain DO on 2/27/2025  Stage prefix: Initial diagnosis         Pertinent Medical History    2009 T1b N0 ER/[place] positive HER2 negative infiltrating ductal carcinoma right breast, stage Ia.  Lumpectomy, RT.  Tamoxifen ending 2014.    JAK2 mutated essential thrombocytosis.  Diagnosed September 2018.  Aspirin 81 mg p.o. daily.    December 6, 2024 left shoulder MRI showed supraspinatus tendinosis and  "regional bursitis.  Left axillary lymph nodes up to 1 cm noted.  January 20, 2025 biopsy of left axillary lymph node showed no evidence of breast cancer.  CD5 positive B-cell population consistent with SLL was noted. Del 13 q.  WBC 6.6, hemoglobin 15.1, platelet count 645, normal differential.  CMP shows total protein 6.2, otherwise normal.  LDH and uric acid both normal.  February 6, 2025 PET/CT showed mildly prominent bilateral axillary and subpectoral nodes, SUVs up to 2.7.  Stable splenomegaly, 17 cm, SUV 2.8.      Review of Systems   Constitutional:  Negative for appetite change, diaphoresis, fatigue and fever.   HENT:  Negative for sinus pain.    Eyes:  Negative for discharge.   Respiratory:  Negative for cough and shortness of breath.    Cardiovascular:  Negative for chest pain.   Gastrointestinal:  Negative for abdominal pain, constipation and diarrhea.   Endocrine: Negative for cold intolerance.   Genitourinary:  Negative for difficulty urinating and hematuria.   Musculoskeletal:  Negative for joint swelling.   Skin:  Negative for rash.   Allergic/Immunologic: Negative for environmental allergies.   Neurological:  Negative for dizziness and headaches.   Hematological:  Negative for adenopathy.   Psychiatric/Behavioral:  Negative for agitation.      {Select to insert medical history sections (Optional):21855}      Objective{?Quick Links Trend Vitals * Enter New Vitals * Results Review * Timeline (Adult) * Labs * Imaging * Cardiology * Procedures * Lung Cancer Screening * Surgical eConsent :34071}  /80 (BP Location: Left arm, Patient Position: Sitting, Cuff Size: Standard)   Pulse 79   Temp 98.2 °F (36.8 °C) (Temporal)   Resp 17   Ht 5' 3\" (1.6 m)   Wt 54.7 kg (120 lb 8 oz)   LMP  (LMP Unknown)   SpO2 98%   BMI 21.35 kg/m²     Pain Screening:  Pain Score: 0-No pain  ECOG   ***  Physical Exam  Constitutional:       Appearance: She is well-developed.   HENT:      Head: Normocephalic and " atraumatic.   Eyes:      Pupils: Pupils are equal, round, and reactive to light.   Cardiovascular:      Rate and Rhythm: Normal rate.      Heart sounds: No murmur heard.  Pulmonary:      Effort: No respiratory distress.      Breath sounds: No wheezing or rales.   Abdominal:      General: There is no distension.      Palpations: Abdomen is soft.      Tenderness: There is no abdominal tenderness. There is no rebound.   Musculoskeletal:         General: No tenderness.      Cervical back: Neck supple.   Lymphadenopathy:      Cervical: No cervical adenopathy.   Skin:     General: Skin is warm.      Findings: No rash.   Neurological:      Mental Status: She is alert and oriented to person, place, and time.      Deep Tendon Reflexes: Reflexes normal.   Psychiatric:         Thought Content: Thought content normal.         Labs: I have reviewed the following labs:  Lab Results   Component Value Date/Time    WBC 6.65 02/04/2025 07:58 AM    RBC 5.41 (H) 02/04/2025 07:58 AM    Hemoglobin 15.1 02/04/2025 07:58 AM    Hematocrit 48.4 (H) 02/04/2025 07:58 AM    MCV 90 02/04/2025 07:58 AM    MCH 27.9 02/04/2025 07:58 AM    RDW 15.6 (H) 02/04/2025 07:58 AM    Platelets 645 (H) 02/04/2025 07:58 AM    Segmented % 60 02/04/2025 07:58 AM    Lymphocytes % 25 02/04/2025 07:58 AM    Monocytes % 8 02/04/2025 07:58 AM    Eosinophils Relative 4 02/04/2025 07:58 AM    Basophils Relative 2 (H) 02/04/2025 07:58 AM    Immature Grans % 1 02/04/2025 07:58 AM    Absolute Neutrophils 3.99 02/04/2025 07:58 AM     Lab Results   Component Value Date/Time    Potassium 4.1 02/04/2025 07:58 AM    Chloride 105 02/04/2025 07:58 AM    CO2 27 02/04/2025 07:58 AM    BUN 17 02/04/2025 07:58 AM    Creatinine 0.80 02/04/2025 07:58 AM    Glucose, Fasting 81 02/04/2025 07:58 AM    Calcium 9.4 02/04/2025 07:58 AM    AST 20 02/04/2025 07:58 AM    ALT 11 02/04/2025 07:58 AM    Alkaline Phosphatase 58 02/04/2025 07:58 AM    Total Protein 6.2 (L) 02/04/2025 07:58 AM     Albumin 4.3 02/04/2025 07:58 AM    Total Bilirubin 0.99 02/04/2025 07:58 AM    eGFR 85 02/04/2025 07:58 AM   { AMB ONCOLOGY LABS (Optional):55224}    {Radiology Results Review (Optional):31779}    {Administrative / Billing Section (Optional):25186}

## 2025-02-27 NOTE — LETTER
2025     Marilyn Ceron MD  [address]  [address]  [facility] [address]    Patient: Kalyn Joshi   YOB: 1970   Date of Visit: 2025       Dear Dr. Ceron:    Thank you for referring Kalyn Joshi to me for evaluation. Below are my notes for this consultation.    If you have questions, please do not hesitate to call me. I look forward to following your patient along with you.         Sincerely,        Sarkis Mcclain DO        CC: MD Sarkis New DO  2025  5:30 PM  Sign when Signing Visit  Name: Kalyn Joshi      : 1970      MRN: [id number]  Encounter Provider: Sarkis Mcclain DO  Encounter Date: 2025   Encounter department: [facility] [place]  :  Assessment & Plan  Essential thrombocytosis (HCC)  Counts are stable.  Continue aspirin 81 mg p.o. daily.  Orders:  •  CBC and differential; Future  •  Comprehensive metabolic panel; Future    Small lymphocytic lymphoma (HCC)  See HPI.  I believe she has Lugano stage II SLL.  Hypermetabolism and splenomegaly on PET/CT could be reflective of this lymphoproliferative disorder or, alternatively, background essential thrombocytosis.  In either event, recommendation is for observation.  She is essentially asymptomatic from this condition with no anemia, thrombocytopenia, B symptoms.  We reviewed that when treatment is indicated it generally is effective at disease control.  Remissions tend to last for years.  Treatment is generally well-tolerated.  Disease is not considered to be curable, however.  Based on all of this observation is recommended consistent with NCCN guidelines.           Return in about 3 months (around 2025) for Labs - See orders.    History of Present Illness  Chief Complaint   Patient presents with   • Follow-up     Oncology History  Cancer Staging   Personal history of adenocarcinoma of breast  Staging form: Breast, AJCC 7th  Edition  - Pathologic: Stage IA (T1b, N0, cM0) - Signed by Mari Clifton MD on 5/21/2018  Laterality: Right  Histologic grade (G): G2  Estrogen receptor status: Positive  Progesterone receptor status: Positive  HER2 status: Negative  Oncology History Overview Note   9/2018 - observation with 81 mg ASA daily     Personal history of adenocarcinoma of breast   4/2009 Initial Diagnosis    Invasive ductal carcinoma of breast, female, right (HCC)     4/2009 - 5/2009 Cancer Staged     T1 B. N0, ER/NH positive, HER-2 negative, infiltrating ductal carcinoma.   Stage IA, right     5/11/2009 Surgery    Right partial mastectomy, SLNB, breast implant.  Dr Ca     5/2009 Genetic Testing    BRCA negative     2009 - 2009 Radiation    WBRT.  Dr Dao     6/2009 - 6/2014 Hormone Therapy    Tamoxifen     Essential thrombocytosis (HCC)   6/13/2018 Initial Diagnosis    In the past, Plt count had increased and return to normal.  Pt was screened for ET with Jorge mutation and was negative.      Then in June 2018, platelet count elevated again and mutation testing was completed.      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    BCR-aBL was negative via FISH.      Small lymphocytic lymphoma (HCC)   2/27/2025 Initial Diagnosis    Small lymphocytic lymphoma (HCC)     2/27/2025 -  Cancer Staged    Staging form: Chronic Lymphocytic Leukemia, AJCC 8th Edition  - Clinical stage from 2/27/2025: Modified Adkins Stage I (Modified Adkins risk: Intermediate, Lugano: Stage II, Lymphocytosis: Absent, Adenopathy: Present, Organomegaly: Present, Anemia: Absent, Thrombocytopenia: Absent) - Signed by Sarkis Mcclain DO on 2/27/2025  Stage prefix: Initial diagnosis         Pertinent Medical History    2009 T1b N0 ER/NH positive HER2 negative infiltrating ductal carcinoma right breast, stage Ia.  Lumpectomy, RT.  Tamoxifen ending 2014.    JAK2 mutated essential thrombocytosis.  Diagnosed September 2018.  Aspirin 81 mg p.o.  "daily.    December 6, 2024 left shoulder MRI showed supraspinatus tendinosis and regional bursitis.  Left axillary lymph nodes up to 1 cm noted.  January 20, 2025 biopsy of left axillary lymph node showed no evidence of breast cancer.  CD5 positive B-cell population consistent with SLL was noted. Del 13 q.  WBC 6.6, hemoglobin 15.1, platelet count 645, normal differential.  CMP shows total protein 6.2, otherwise normal.  LDH and uric acid both normal.  February 6, 2025 PET/CT showed mildly prominent bilateral axillary and subpectoral nodes, SUVs up to 2.7.  Stable splenomegaly, 17 cm, SUV 2.8.      Review of Systems   Constitutional:  Negative for appetite change, diaphoresis, fatigue and fever.   HENT:  Negative for sinus pain.    Eyes:  Negative for discharge.   Respiratory:  Negative for cough and shortness of breath.    Cardiovascular:  Negative for chest pain.   Gastrointestinal:  Negative for abdominal pain, constipation and diarrhea.   Endocrine: Negative for cold intolerance.   Genitourinary:  Negative for difficulty urinating and hematuria.   Musculoskeletal:  Negative for joint swelling.   Skin:  Negative for rash.   Allergic/Immunologic: Negative for environmental allergies.   Neurological:  Negative for dizziness and headaches.   Hematological:  Negative for adenopathy.   Psychiatric/Behavioral:  Negative for agitation.            Objective  /80 (BP Location: Left arm, Patient Position: Sitting, Cuff Size: Standard)   Pulse 79   Temp 98.2 °F (36.8 °C) (Temporal)   Resp 17   Ht 5' 3\" (1.6 m)   Wt 54.7 kg (120 lb 8 oz)   LMP  (LMP Unknown)   SpO2 98%   BMI 21.35 kg/m²     Pain Screening:  Pain Score: 0-No pain  ECOG ECOG Performance Status: 0 - Fully active, able to carry on all pre-disease performance without restriction   Physical Exam  Constitutional:       Appearance: She is well-developed.   HENT:      Head: Normocephalic and atraumatic.   Eyes:      Pupils: Pupils are equal, round, and " reactive to light.   Cardiovascular:      Rate and Rhythm: Normal rate.      Heart sounds: No murmur heard.  Pulmonary:      Effort: No respiratory distress.      Breath sounds: No wheezing or rales.   Abdominal:      General: There is no distension.      Palpations: Abdomen is soft.      Tenderness: There is no abdominal tenderness. There is no rebound.   Musculoskeletal:         General: No tenderness.      Cervical back: Neck supple.   Lymphadenopathy:      Cervical: No cervical adenopathy.   Skin:     General: Skin is warm.      Findings: No rash.   Neurological:      Mental Status: She is alert and oriented to person, place, and time.      Deep Tendon Reflexes: Reflexes normal.   Psychiatric:         Thought Content: Thought content normal.         Labs: I have reviewed the following labs:  Lab Results   Component Value Date/Time    WBC 6.65 02/04/2025 07:58 AM    RBC 5.41 (H) 02/04/2025 07:58 AM    Hemoglobin 15.1 02/04/2025 07:58 AM    Hematocrit 48.4 (H) 02/04/2025 07:58 AM    MCV 90 02/04/2025 07:58 AM    MCH 27.9 02/04/2025 07:58 AM    RDW 15.6 (H) 02/04/2025 07:58 AM    Platelets 645 (H) 02/04/2025 07:58 AM    Segmented % 60 02/04/2025 07:58 AM    Lymphocytes % 25 02/04/2025 07:58 AM    Monocytes % 8 02/04/2025 07:58 AM    Eosinophils Relative 4 02/04/2025 07:58 AM    Basophils Relative 2 (H) 02/04/2025 07:58 AM    Immature Grans % 1 02/04/2025 07:58 AM    Absolute Neutrophils 3.99 02/04/2025 07:58 AM     Lab Results   Component Value Date/Time    Potassium 4.1 02/04/2025 07:58 AM    Chloride 105 02/04/2025 07:58 AM    CO2 27 02/04/2025 07:58 AM    BUN 17 02/04/2025 07:58 AM    Creatinine 0.80 02/04/2025 07:58 AM    Glucose, Fasting 81 02/04/2025 07:58 AM    Calcium 9.4 02/04/2025 07:58 AM    AST 20 02/04/2025 07:58 AM    ALT 11 02/04/2025 07:58 AM    Alkaline Phosphatase 58 02/04/2025 07:58 AM    Total Protein 6.2 (L) 02/04/2025 07:58 AM    Albumin 4.3 02/04/2025 07:58 AM    Total Bilirubin 0.99  02/04/2025 07:58 AM    eGFR 85 02/04/2025 07:58 AM

## 2025-02-27 NOTE — LETTER
2025     Marilyn Ceron MD  [address]  [address]  [facility] [address]    Patient: Kalyn Joshi   YOB: 1970   Date of Visit: 2025       Dear Dr. Ceron:    Thank you for referring Kalyn Joshi to me for evaluation. Below are my notes for this consultation.    If you have questions, please do not hesitate to call me. I look forward to following your patient along with you.         Sincerely,        Sarkis Mcclain DO        CC: MD Sarkis New DO  2025  4:42 PM  Sign when Signing Visit  Name: Kalyn Joshi      : 1970      MRN: [id number]  Encounter Provider: Sarkis Mcclain DO  Encounter Date: 2025   Encounter department: [facility] [place]  :  Assessment & Plan        No follow-ups on file.    History of Present Illness{?Quick Links Encounters * My Last Note * Last Note in Specialty * Snapshot * Since Last Visit * History :13599}  Chief Complaint   Patient presents with    Follow-up     Oncology History  Cancer Staging   Personal history of adenocarcinoma of breast  Staging form: Breast, AJCC 7th Edition  - Pathologic: Stage IA (T1b, N0, cM0) - Signed by Mari Clifton MD on 2018  Laterality: Right  Histologic grade (G): G2  Estrogen receptor status: Positive  Progesterone receptor status: Positive  HER2 status: Negative  Oncology History Overview Note   2018 - observation with 81 mg ASA daily     Personal history of adenocarcinoma of breast   2009 Initial Diagnosis    Invasive ductal carcinoma of breast, female, right (HCC)     2009 - 2009 Cancer Staged     T1 B. N0, ER/CA positive, HER-2 negative, infiltrating ductal carcinoma.   Stage IA, right     2009 Surgery    Right partial mastectomy, SLNB, breast implant.  Dr Ca     2009 Genetic Testing    BRCA negative      -  Radiation    WBRT.  Dr Dao     2009 - 2014 Hormone Therapy    Tamoxifen     Essential  thrombocytosis (HCC)   6/13/2018 Initial Diagnosis    In the past, Plt count had increased and return to normal.  Pt was screened for ET with Jorge mutation and was negative.      Then in June 2018, platelet count elevated again and mutation testing was completed.      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    BCR-aBL was negative via FISH.        Pertinent Medical History    2009 T1b N0 ER/MN positive HER2 negative infiltrating ductal carcinoma right breast, stage Ia.  Lumpectomy, RT.  Tamoxifen ending 2014.    JAK2 mutated essential thrombocytosis.  Diagnosed September 2018.  Aspirin 81 mg p.o. daily.    December 6, 2024 left shoulder MRI showed supraspinatus tendinosis and regional bursitis.  Left axillary lymph nodes up to 1 cm noted.  January 20, 2025 biopsy of left axillary lymph node showed no evidence of breast cancer.  CD5 positive B-cell population consistent with SLL was noted. Del 13 q.  WBC 6.6, hemoglobin 15.1, platelet count 645, normal differential.  CMP shows total protein 6.2, otherwise normal.  LDH and uric acid both normal.  February 6, 2025 PET/CT showed mildly prominent bilateral axillary and subpectoral nodes, SUVs up to 2.7.  Stable splenomegaly, 17 cm, SUV 2.8.      Review of Systems   Constitutional:  Negative for appetite change, diaphoresis, fatigue and fever.   HENT:  Negative for sinus pain.    Eyes:  Negative for discharge.   Respiratory:  Negative for cough and shortness of breath.    Cardiovascular:  Negative for chest pain.   Gastrointestinal:  Negative for abdominal pain, constipation and diarrhea.   Endocrine: Negative for cold intolerance.   Genitourinary:  Negative for difficulty urinating and hematuria.   Musculoskeletal:  Negative for joint swelling.   Skin:  Negative for rash.   Allergic/Immunologic: Negative for environmental allergies.   Neurological:  Negative for dizziness and headaches.   Hematological:  Negative for adenopathy.  "  Psychiatric/Behavioral:  Negative for agitation.      {Select to insert medical history sections (Optional):62076}      Objective{?Quick Links Trend Vitals * Enter New Vitals * Results Review * Timeline (Adult) * Labs * Imaging * Cardiology * Procedures * Lung Cancer Screening * Surgical eConsent :65605}  /80 (BP Location: Left arm, Patient Position: Sitting, Cuff Size: Standard)   Pulse 79   Temp 98.2 °F (36.8 °C) (Temporal)   Resp 17   Ht 5' 3\" (1.6 m)   Wt 54.7 kg (120 lb 8 oz)   LMP  (LMP Unknown)   SpO2 98%   BMI 21.35 kg/m²     Pain Screening:  Pain Score: 0-No pain  ECOG   ***  Physical Exam  Constitutional:       Appearance: She is well-developed.   HENT:      Head: Normocephalic and atraumatic.   Eyes:      Pupils: Pupils are equal, round, and reactive to light.   Cardiovascular:      Rate and Rhythm: Normal rate.      Heart sounds: No murmur heard.  Pulmonary:      Effort: No respiratory distress.      Breath sounds: No wheezing or rales.   Abdominal:      General: There is no distension.      Palpations: Abdomen is soft.      Tenderness: There is no abdominal tenderness. There is no rebound.   Musculoskeletal:         General: No tenderness.      Cervical back: Neck supple.   Lymphadenopathy:      Cervical: No cervical adenopathy.   Skin:     General: Skin is warm.      Findings: No rash.   Neurological:      Mental Status: She is alert and oriented to person, place, and time.      Deep Tendon Reflexes: Reflexes normal.   Psychiatric:         Thought Content: Thought content normal.         Labs: I have reviewed the following labs:  Lab Results   Component Value Date/Time    WBC 6.65 02/04/2025 07:58 AM    RBC 5.41 (H) 02/04/2025 07:58 AM    Hemoglobin 15.1 02/04/2025 07:58 AM    Hematocrit 48.4 (H) 02/04/2025 07:58 AM    MCV 90 02/04/2025 07:58 AM    MCH 27.9 02/04/2025 07:58 AM    RDW 15.6 (H) 02/04/2025 07:58 AM    Platelets 645 (H) 02/04/2025 07:58 AM    Segmented % 60 02/04/2025 07:58 " AM    Lymphocytes % 25 02/04/2025 07:58 AM    Monocytes % 8 02/04/2025 07:58 AM    Eosinophils Relative 4 02/04/2025 07:58 AM    Basophils Relative 2 (H) 02/04/2025 07:58 AM    Immature Grans % 1 02/04/2025 07:58 AM    Absolute Neutrophils 3.99 02/04/2025 07:58 AM     Lab Results   Component Value Date/Time    Potassium 4.1 02/04/2025 07:58 AM    Chloride 105 02/04/2025 07:58 AM    CO2 27 02/04/2025 07:58 AM    BUN 17 02/04/2025 07:58 AM    Creatinine 0.80 02/04/2025 07:58 AM    Glucose, Fasting 81 02/04/2025 07:58 AM    Calcium 9.4 02/04/2025 07:58 AM    AST 20 02/04/2025 07:58 AM    ALT 11 02/04/2025 07:58 AM    Alkaline Phosphatase 58 02/04/2025 07:58 AM    Total Protein 6.2 (L) 02/04/2025 07:58 AM    Albumin 4.3 02/04/2025 07:58 AM    Total Bilirubin 0.99 02/04/2025 07:58 AM    eGFR 85 02/04/2025 07:58 AM   {SL AMB ONCOLOGY LABS (Optional):15389}    {Radiology Results Review (Optional):96346}    {Administrative / Billing Section (Optional):68128}

## 2025-03-12 ENCOUNTER — RA CDI HCC (OUTPATIENT)
Dept: OTHER | Facility: HOSPITAL | Age: 55
End: 2025-03-12

## 2025-03-12 NOTE — PROGRESS NOTES
HCC coding opportunities       Chart reviewed, no opportunity found: CHART REVIEWED, NO OPPORTUNITY FOUND      This is a reminder to address (resolve/update/assess) ALL HCC (risk adjustment) codes as found on active problem list for 2025 as patient scores reset to zero KAMERON.  Patients Insurance        Commercial Insurance: [employer]

## 2025-03-20 ENCOUNTER — OFFICE VISIT (OUTPATIENT)
Age: 55
End: 2025-03-20
Payer: COMMERCIAL

## 2025-03-20 VITALS
HEART RATE: 66 BPM | SYSTOLIC BLOOD PRESSURE: 114 MMHG | RESPIRATION RATE: 16 BRPM | WEIGHT: 117 LBS | DIASTOLIC BLOOD PRESSURE: 80 MMHG | HEIGHT: 63 IN | BODY MASS INDEX: 20.73 KG/M2 | OXYGEN SATURATION: 98 % | TEMPERATURE: 98 F

## 2025-03-20 DIAGNOSIS — Z13.29 THYROID DISORDER SCREEN: ICD-10-CM

## 2025-03-20 DIAGNOSIS — Z00.00 ANNUAL PHYSICAL EXAM: Primary | ICD-10-CM

## 2025-03-20 DIAGNOSIS — Z13.220 LIPID SCREENING: ICD-10-CM

## 2025-03-20 PROCEDURE — 99386 PREV VISIT NEW AGE 40-64: CPT | Performed by: INTERNAL MEDICINE

## 2025-03-20 NOTE — PATIENT INSTRUCTIONS
"Patient Education     Routine physical for adults   The Basics   Written by the doctors and editors at [facility]   What is a physical? -- A physical is a routine visit, or \"check-up,\" with your doctor. You might also hear it called a \"wellness visit\" or \"preventive visit.\"  During each visit, the doctor will:   Ask about your physical and mental health   Ask about your habits, behaviors, and lifestyle   Do an exam   Give you vaccines if needed   Talk to you about any medicines you take   Give advice about your health   Answer your questions  Getting regular check-ups is an important part of taking care of your health. It can help your doctor find and treat any problems you have. But it's also important for preventing health problems.  A routine physical is different from a \"sick visit.\" A sick visit is when you see a doctor because of a health concern or problem. Since physicals are scheduled ahead of time, you can think about what you want to ask the doctor.  How often should I get a physical? -- It depends on your age and health. In general, for people age 21 years and older:   If you are younger than 50 years, you might be able to get a physical every 3 years.   If you are 50 years or older, your doctor might recommend a physical every year.  If you have an ongoing health condition, like diabetes or high blood pressure, your doctor will probably want to see you more often.  What happens during a physical? -- In general, each visit will include:   Physical exam - The doctor or nurse will check your height, weight, heart rate, and blood pressure. They will also look at your eyes and ears. They will ask about how you are feeling and whether you have any symptoms that bother you.   Medicines - It's a good idea to bring a list of all the medicines you take to each doctor visit. Your doctor will talk to you about your medicines and answer any questions. Tell them if you are having any side effects that bother you. You " "should also tell them if you are having trouble paying for any of your medicines.   Habits and behaviors - This includes:   Your diet   Your exercise habits   Whether you smoke, drink alcohol, or use drugs   Whether you are sexually active   Whether you feel safe at home  Your doctor will talk to you about things you can do to improve your health and lower your risk of health problems. They will also offer help and support. For example, if you want to quit smoking, they can give you advice and might prescribe medicines. If you want to improve your diet or get more physical activity, they can help you with this, too.   Lab tests, if needed - The tests you get will depend on your age and situation. For example, your doctor might want to check your:   Cholesterol   Blood sugar   Iron level   Vaccines - The recommended vaccines will depend on your age, health, and what vaccines you already had. Vaccines are very important because they can prevent certain serious or deadly infections.   Discussion of screening - \"Screening\" means checking for diseases or other health problems before they cause symptoms. Your doctor can recommend screening based on your age, risk, and preferences. This might include tests to check for:   Cancer, such as breast, prostate, cervical, ovarian, colorectal, prostate, lung, or skin cancer   Sexually transmitted infections, such as chlamydia and gonorrhea   Mental health conditions like depression and anxiety  Your doctor will talk to you about the different types of screening tests. They can help you decide which screenings to have. They can also explain what the results might mean.   Answering questions - The physical is a good time to ask the doctor or nurse questions about your health. If needed, they can refer you to other doctors or specialists, too.  Adults older than 65 years often need other care, too. As you get older, your doctor will talk to you about:   How to prevent falling at " home   Hearing or vision tests   Memory testing   How to take your medicines safely   Making sure that you have the help and support you need at home  All topics are updated as new evidence becomes available and our peer review process is complete.  This topic retrieved from [employer] on: May 02, 2024.  Topic 337473 Version 1.0  Release: 32.4.3 - C32.122  © 2024 UpToDate, Inc. and/or its affiliates. All rights reserved.  Consumer Information Use and Disclaimer   Disclaimer: This generalized information is a limited summary of diagnosis, treatment, and/or medication information. It is not meant to be comprehensive and should be used as a tool to help the user understand and/or assess potential diagnostic and treatment options. It does NOT include all information about conditions, treatments, medications, side effects, or risks that may apply to a specific patient. It is not intended to be medical advice or a substitute for the medical advice, diagnosis, or treatment of a health care provider based on the health care provider's examination and assessment of a patient's specific and unique circumstances. Patients must speak with a health care provider for complete information about their health, medical questions, and treatment options, including any risks or benefits regarding use of medications. This information does not endorse any treatments or medications as safe, effective, or approved for treating a specific patient. UpToDate, Inc. and its affiliates disclaim any warranty or liability relating to this information or the use thereof.The use of this information is governed by the Terms of Use, available at https://www.woltersIMayGouuwer.com/en/know/clinical-effectiveness-terms. 2024© UpToDate, Inc. and its affiliates and/or licensors. All rights reserved.  Copyright   © 2024 UpToDate, Inc. and/or its affiliates. All rights reserved.

## 2025-03-20 NOTE — PROGRESS NOTES
Adult Annual Physical  Name: Kalyn Joshi      : 1970      MRN: [id number]  Encounter Provider: Marilyn Ceron MD  Encounter Date: 3/20/2025   Encounter department: [facility]    Assessment & Plan  Annual physical exam  Patient is here for annual physical.  She is due for thyroid lipid profile which will be done with next lab draw.  Up-to-date with mammogram.  She is due for colonoscopy and will be contacting  for so.  Encouraged her to get Prevnar 20 at the pharmacy as we are out on the vaccine.  She also be getting her second shingles vaccination.         Lipid screening    Orders:    Lipid panel    Thyroid disorder screen    Orders:    TSH, 3rd generation with Free T4 reflex      Preventive Screenings:    - Breast cancer screening: has history of breast cancer     Immunizations:  - Immunizations due: Prevnar 20, Tdap and Zoster (Shingrix)       Patient is here for annual physical.  Patient was complaining of left shoulder discomfort underwent MRI that showed slightly enlarged left axillary adenopathy.  This was followed up with ultrasound-guided biopsy that showed CD5 positive B cells.  Flow cytometry also confirmed monoclonal CD5 positive B cells.  PET/CT was ordered by hematology which was reviewed.  Patient remains under care of hematology and is being monitored closely.  Patient voices no type B symptoms.  Her hemoglobin is good.  Her essential thrombocytosis is stable.  Splenomegaly was noted on the PET scan.  Patient is up-to-date with mammogram.  She will be contacting  for colonoscopy.  We discussed vaccinations   History of Present Illness     Adult Annual Physical:  Patient presents for annual physical.     Diet and Physical Activity:  - Diet/Nutrition: no special diet.  - Exercise: moderate cardiovascular exercise and 1-2 times a week on average.    General Health:  - Sleep: sleeps well.  - Vision: most recent eye exam < 1 year ago.  - Dental: regular dental  visits.    /GYN Health:  - Follows with GYN: yes.   - Menopause: postmenopausal.   - History of STDs: no  - Contraception: hysterectomy.      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 (HPV)     last assessed - 03Jun2013    HPV (human papilloma virus) infection     cold knife endometrial curettage  today   1/20/2023    Hx of radiation therapy     BALDEMAR-2 gene mutation     Moderate dysplasia of cervix (EMILI II)     last assessed - 20Dec2013    Ovarian cyst     last assessed - 04Jun2013    Pap smear abnormality of cervix with ASCUS favoring dysplasia     last assessed - 19Dec2013    PONV (postoperative nausea and vomiting)     Secondary amenorrhea     Thrombocytosis     Use of tamoxifen (Nolvadex)     last assessed - 09May2017    Varicella      Past Surgical History:   Procedure Laterality Date    BREAST LUMPECTOMY Right     last assessed - 13May2013    BREAST LUMPECTOMY Right 05/11/2009    BREAST LUMPECTOMY Right 06/05/2009    BREAST RECONSTRUCTION Bilateral 10/2018    bilateral implants    BREAST RECONSTRUCTION Right 02/2019    right implant    CERVICAL BIOPSY N/A 1/20/2023    Procedure: COLD KNIFE CONE, ENDOMETRIAL CURETTAGE;  Surgeon: Mel Kirkland MD;  Location: [facility];  Service: Gynecology Oncology    CERVICAL BIOPSY  W/ LOOP ELECTRODE EXCISION      COLPOSCOPY W/ BIOPSY / CURETTAGE      Colposcopy Cervix with Biopsy(s) with Endocervical Currettage    CYSTOSCOPY N/A 1/25/2023    Procedure: CYSTOSCOPY;  Surgeon: Trey Yeboah MD;  Location: [facility];  Service: Gynecology Oncology    CYSTOSCOPY N/A 11/15/2023    Procedure: Cystoscopy;  Surgeon: Trey Yeboah MD;  Location[facility];  Service: Gynecology Oncology     HYSTERECTOMY Bilateral 11/15/2023    Procedure: Total laparoscopic hysterectomy, bilateral salpingo-oophorectomy;  Surgeon: Trey Yeboah MD;  Location[facility];  Service: Gynecology Oncology    MASTECTOMY, PARTIAL Right 05/11/2009    right partial mastectomy re-excision 6/26/09    SD LAPS ABD PRTM&OMENTUM DX W/WO SPEC BR/WA SPX N/A 1/25/2023    Procedure: LAPAROSCOPY DIAGNOSTIC, PELVIC WASHOUT AND PELVIC DRAIN PLACEMENT;  Surgeon: Trey Yeboah MD;  Location[facility];  Service: Gynecology Oncology    SENTINEL LYMPH NODE BIOPSY Right     US GUIDED BREAST LYMPH NODE BIOPSY LEFT Left 1/20/2025     Family History   Problem Relation Age of Onset    No Known Problems Mother     No Known Problems Father     No Known Problems Sister     No Known Problems Maternal Aunt     Cancer Paternal Aunt 60    No Known Problems Paternal Aunt     No Known Problems Paternal Aunt     No Known Problems Maternal Grandmother     No Known Problems Maternal Grandfather     Ovarian cancer Paternal Grandmother         age unknown    No Known Problems Paternal Grandfather     Colon polyps Cousin     Colon cancer Cousin 50    No Known Problems Half-Sister       reports that she quit smoking about 13 years ago. Her smoking use included cigarettes. She started smoking about 39 years ago. She has a 6.5 pack-year smoking history. She has never used smokeless tobacco. She reports current alcohol use of about 2.0 standard drinks of alcohol per week. She reports that she does not use drugs.  Current Outpatient Medications   Medication Instructions    ascorbic acid (VITAMIN C) 500 mg tablet TAKE 1 TABLET BY MOUTH MON, WED, FRI WITH IRON TABS    aspirin (ECOTRIN LOW STRENGTH) 81 mg, Daily    ferrous sulfate 324 (65 Fe) mg TAKE 1 TABLET BY MOUTH ON MON, WED, FRI    tretinoin (RETIN-A) 0.025 % cream As needed     Allergies   Allergen Reactions    Cephalexin Rash      Current Outpatient Medications on File Prior to Visit   Medication Sig  "Dispense Refill    aspirin (ECOTRIN LOW STRENGTH) 81 mg EC tablet Take 81 mg by mouth daily      tretinoin (RETIN-A) 0.025 % cream as needed      ascorbic acid (VITAMIN C) 500 mg tablet TAKE 1 TABLET BY MOUTH MON, WED, FRI WITH IRON TABS (Patient not taking: Reported on 2024) 36 tablet 5    ferrous sulfate 324 (65 Fe) mg TAKE 1 TABLET BY MOUTH ON MON, WED, FRI (Patient not taking: Reported on 2024) 36 tablet 5     No current facility-administered medications on file prior to visit.      Social History     Tobacco Use    Smoking status: Former     Current packs/day: 0.00     Average packs/day: 0.3 packs/day for 26.0 years (6.5 ttl pk-yrs)     Types: Cigarettes     Start date:      Quit date:      Years since quittin.2    Smokeless tobacco: Never    Tobacco comments:     former social smoker   Vaping Use    Vaping status: Never Used   Substance and Sexual Activity    Alcohol use: Yes     Alcohol/week: 2.0 standard drinks of alcohol     Types: 2 Cans of beer per week     Comment: 4  x   weekly    Drug use: No    Sexual activity: Yes     Partners: Male     Birth control/protection: Implant       Objective   /80 (BP Location: Left arm, Patient Position: Sitting, Cuff Size: Large)   Pulse 66   Temp 98 °F (36.7 °C) (Temporal)   Resp 16   Ht 5' 3\" (1.6 m)   Wt 53.1 kg (117 lb)   LMP  (LMP Unknown)   SpO2 98%   BMI 20.73 kg/m²     Physical Exam  Neck:      Vascular: No carotid bruit.   Cardiovascular:      Rate and Rhythm: Normal rate and regular rhythm.      Heart sounds: Normal heart sounds. No murmur heard.  Pulmonary:      Effort: Pulmonary effort is normal. No respiratory distress.      Breath sounds: Normal breath sounds. No wheezing or rales.   Abdominal:      General: Bowel sounds are normal. There is no distension.      Tenderness: There is no abdominal tenderness. There is no guarding.   Musculoskeletal:      Right lower leg: No edema.      Left lower leg: No edema. "   Lymphadenopathy:      Cervical: No cervical adenopathy.   Neurological:      Mental Status: She is alert.   Psychiatric:         Mood and Affect: Mood normal.         Behavior: Behavior normal.

## 2025-05-12 ENCOUNTER — APPOINTMENT (OUTPATIENT)
Dept: LAB | Facility: MEDICAL CENTER | Age: 55
End: 2025-05-12
Payer: COMMERCIAL

## 2025-05-12 DIAGNOSIS — D47.3 ESSENTIAL THROMBOCYTOSIS (HCC): ICD-10-CM

## 2025-05-12 LAB
ALBUMIN SERPL BCG-MCNC: 4.5 G/DL (ref 3.5–5)
ALP SERPL-CCNC: 78 U/L (ref 34–104)
ALT SERPL W P-5'-P-CCNC: 14 U/L (ref 7–52)
ANION GAP SERPL CALCULATED.3IONS-SCNC: 9 MMOL/L (ref 4–13)
AST SERPL W P-5'-P-CCNC: 20 U/L (ref 13–39)
BASOPHILS # BLD AUTO: 0.17 THOUSANDS/ÂΜL (ref 0–0.1)
BASOPHILS NFR BLD AUTO: 2 % (ref 0–1)
BILIRUB SERPL-MCNC: 1.24 MG/DL (ref 0.2–1)
BUN SERPL-MCNC: 19 MG/DL (ref 5–25)
CALCIUM SERPL-MCNC: 9.7 MG/DL (ref 8.4–10.2)
CHLORIDE SERPL-SCNC: 104 MMOL/L (ref 96–108)
CHOLEST SERPL-MCNC: 197 MG/DL (ref ?–200)
CO2 SERPL-SCNC: 26 MMOL/L (ref 21–32)
CREAT SERPL-MCNC: 0.83 MG/DL (ref 0.6–1.3)
EOSINOPHIL # BLD AUTO: 0.25 THOUSAND/ÂΜL (ref 0–0.61)
EOSINOPHIL NFR BLD AUTO: 3 % (ref 0–6)
ERYTHROCYTE [DISTWIDTH] IN BLOOD BY AUTOMATED COUNT: 14.8 % (ref 11.6–15.1)
GFR SERPL CREATININE-BSD FRML MDRD: 80 ML/MIN/1.73SQ M
GLUCOSE P FAST SERPL-MCNC: 96 MG/DL (ref 65–99)
HCT VFR BLD AUTO: 49.7 % (ref 34.8–46.1)
HDLC SERPL-MCNC: 65 MG/DL
HGB BLD-MCNC: 15.8 G/DL (ref 11.5–15.4)
IMM GRANULOCYTES # BLD AUTO: 0.05 THOUSAND/UL (ref 0–0.2)
IMM GRANULOCYTES NFR BLD AUTO: 1 % (ref 0–2)
LDLC SERPL CALC-MCNC: 109 MG/DL (ref 0–100)
LYMPHOCYTES # BLD AUTO: 1.57 THOUSANDS/ÂΜL (ref 0.6–4.47)
LYMPHOCYTES NFR BLD AUTO: 20 % (ref 14–44)
MCH RBC QN AUTO: 28.7 PG (ref 26.8–34.3)
MCHC RBC AUTO-ENTMCNC: 31.8 G/DL (ref 31.4–37.4)
MCV RBC AUTO: 90 FL (ref 82–98)
MONOCYTES # BLD AUTO: 0.6 THOUSAND/ÂΜL (ref 0.17–1.22)
MONOCYTES NFR BLD AUTO: 8 % (ref 4–12)
NEUTROPHILS # BLD AUTO: 5.23 THOUSANDS/ÂΜL (ref 1.85–7.62)
NEUTS SEG NFR BLD AUTO: 66 % (ref 43–75)
NONHDLC SERPL-MCNC: 132 MG/DL
NRBC BLD AUTO-RTO: 0 /100 WBCS
PLATELET # BLD AUTO: 707 THOUSANDS/UL (ref 149–390)
PMV BLD AUTO: 9.5 FL (ref 8.9–12.7)
POTASSIUM SERPL-SCNC: 4.5 MMOL/L (ref 3.5–5.3)
PROT SERPL-MCNC: 6.8 G/DL (ref 6.4–8.4)
RBC # BLD AUTO: 5.51 MILLION/UL (ref 3.81–5.12)
SODIUM SERPL-SCNC: 139 MMOL/L (ref 135–147)
TRIGL SERPL-MCNC: 113 MG/DL (ref ?–150)
TSH SERPL DL<=0.05 MIU/L-ACNC: 1.45 UIU/ML (ref 0.45–4.5)
WBC # BLD AUTO: 7.87 THOUSAND/UL (ref 4.31–10.16)

## 2025-05-12 PROCEDURE — 80053 COMPREHEN METABOLIC PANEL: CPT

## 2025-05-12 PROCEDURE — 36415 COLL VENOUS BLD VENIPUNCTURE: CPT

## 2025-05-12 PROCEDURE — 85025 COMPLETE CBC W/AUTO DIFF WBC: CPT

## 2025-05-13 ENCOUNTER — RESULTS FOLLOW-UP (OUTPATIENT)
Age: 55
End: 2025-05-13

## 2025-05-13 NOTE — TELEPHONE ENCOUNTER
----- Message from Marilyn Ceron MD sent at 5/13/2025  4:22 PM EDT -----  Cholesterol is getting better

## 2025-05-29 ENCOUNTER — TELEMEDICINE (OUTPATIENT)
Dept: HEMATOLOGY ONCOLOGY | Facility: CLINIC | Age: 55
End: 2025-05-29
Payer: COMMERCIAL

## 2025-05-29 DIAGNOSIS — C83.00 SMALL LYMPHOCYTIC LYMPHOMA (HCC): Primary | ICD-10-CM

## 2025-05-29 DIAGNOSIS — D47.3 ESSENTIAL THROMBOCYTOSIS (HCC): ICD-10-CM

## 2025-05-29 PROCEDURE — 99214 OFFICE O/P EST MOD 30 MIN: CPT | Performed by: INTERNAL MEDICINE

## 2025-05-29 NOTE — ASSESSMENT & PLAN NOTE
Platelets stable and fluctuating in previously established range.  Continue aspirin 81 mg p.o. daily.  Asymptomatic.

## 2025-05-29 NOTE — ASSESSMENT & PLAN NOTE
Observation.    No B symptoms at this time.  Recent CBC shows normal white count, differential.  Hemoglobin fluctuating about the upper end of the reference range.  No anemia or thrombocytopenia.  No indications for therapy at this time.

## 2025-05-29 NOTE — PROGRESS NOTES
Virtual Regular Visit  Name: Kalyn Joshi      : 1970      MRN: [id number]  Encounter Provider: Sarkis Mcclain DO  Encounter Date: 2025   Encounter department: [facility] [place]  :  Assessment & Plan  Small lymphocytic lymphoma (HCC)  Observation.    No B symptoms at this time.  Recent CBC shows normal white count, differential.  Hemoglobin fluctuating about the upper end of the reference range.  No anemia or thrombocytopenia.  No indications for therapy at this time.       Essential thrombocytosis (HCC)  Platelets stable and fluctuating in previously established range.  Continue aspirin 81 mg p.o. daily.  Asymptomatic.       Small lymphocytic lymphoma (HCC)         Essential thrombocytosis (HCC)             History of Present Illness     HPI   T1b N0 ER/ID positive HER2 negative infiltrating ductal carcinoma right breast, stage Ia.  Lumpectomy, RT.  Tamoxifen ending .     JAK2 mutated essential thrombocytosis.  Diagnosed 2018.  Aspirin 81 mg p.o. daily.     2024 left shoulder MRI showed supraspinatus tendinosis and regional bursitis.  Left axillary lymph nodes up to 1 cm noted.  2025 biopsy of left axillary lymph node showed no evidence of breast cancer.  CD5 positive B-cell population consistent with SLL was noted. Del 13 q.  WBC 6.6, hemoglobin 15.1, platelet count 645, normal differential.  CMP shows total protein 6.2, otherwise normal.  LDH and uric acid both normal.  2025 PET/CT showed mildly prominent bilateral axillary and subpectoral nodes, SUVs up to 2.7.  Stable splenomegaly, 17 cm, SUV 2.8.    Review of Systems   Constitutional:  Negative for appetite change, diaphoresis, fatigue and fever.   HENT:  Negative for sinus pain.    Eyes:  Negative for discharge.   Respiratory:  Negative for cough and shortness of breath.    Cardiovascular:  Negative for chest pain.   Gastrointestinal:  Negative for  abdominal pain, constipation and diarrhea.   Endocrine: Negative for cold intolerance.   Genitourinary:  Negative for difficulty urinating and hematuria.   Musculoskeletal:  Negative for joint swelling.   Skin:  Negative for rash.   Allergic/Immunologic: Negative for environmental allergies.   Neurological:  Negative for dizziness and headaches.   Hematological:  Negative for adenopathy.   Psychiatric/Behavioral:  Negative for agitation.        Objective   LMP  (LMP Unknown)     Physical Exam  Constitutional:       Appearance: She is well-developed.   HENT:      Head: Normocephalic.      Nose: Nose normal.      Mouth/Throat:      Mouth: Mucous membranes are moist.     Eyes:      Pupils: Pupils are equal, round, and reactive to light.       Cardiovascular:      Rate and Rhythm: Normal rate and regular rhythm.      Heart sounds: Normal heart sounds.   Pulmonary:      Breath sounds: Normal breath sounds.   Abdominal:      Palpations: Abdomen is soft.      Tenderness: There is no abdominal tenderness.     Musculoskeletal:      Right lower leg: No edema.      Left lower leg: No edema.   Lymphadenopathy:      Cervical: No cervical adenopathy.     Skin:     General: Skin is warm.     Neurological:      Mental Status: She is alert and oriented to person, place, and time.     Psychiatric:         Behavior: Behavior normal.         Administrative Statements   Encounter provider Sarkis Mcclain, DO    The Patient is located at Home and in the following state in which I hold an active license [place].    The patient was identified by name and date of birth. Kalyn Joshi was informed that this is a telemedicine visit and that the visit is being conducted through the Epic Embedded platform. She agrees to proceed..  My office door was closed. No one else was in the room.  She acknowledged consent and understanding of privacy and security of the video platform. The patient has agreed to participate and understands they can  discontinue the visit at any time.    I have spent a total time of 20 minutes in caring for this patient on the day of the visit/encounter including Diagnostic results, Impressions, Documenting in the medical record, Reviewing/placing orders in the medical record (including tests, medications, and/or procedures), and Obtaining or reviewing history  , not including the time spent for establishing the audio/video connection.

## 2025-06-16 ENCOUNTER — TELEPHONE (OUTPATIENT)
Dept: SURGICAL ONCOLOGY | Facility: CLINIC | Age: 55
End: 2025-06-16

## 2025-06-16 NOTE — TELEPHONE ENCOUNTER
Rescheduled appointment with Dr. Clifton due to Dr. Clifton's schedule changing. New appointment is July 16 at 11:30am with Sendy.

## 2025-07-07 ENCOUNTER — HOSPITAL ENCOUNTER (OUTPATIENT)
Dept: MAMMOGRAPHY | Facility: MEDICAL CENTER | Age: 55
Discharge: HOME | End: 2025-07-07
Payer: COMMERCIAL

## 2025-07-07 VITALS — HEIGHT: 63 IN | BODY MASS INDEX: 20.73 KG/M2 | WEIGHT: 117 LBS

## 2025-07-07 DIAGNOSIS — Z12.31 VISIT FOR SCREENING MAMMOGRAM: ICD-10-CM

## 2025-07-07 PROCEDURE — 77063 BREAST TOMOSYNTHESIS BI: CPT

## 2025-07-07 PROCEDURE — 77067 SCR MAMMO BI INCL CAD: CPT

## 2025-07-17 ENCOUNTER — TELEPHONE (OUTPATIENT)
Dept: MAMMOGRAPHY | Facility: CLINIC | Age: 55
End: 2025-07-17

## 2025-08-04 ENCOUNTER — HOSPITAL ENCOUNTER (OUTPATIENT)
Dept: MAMMOGRAPHY | Facility: CLINIC | Age: 55
Discharge: HOME | End: 2025-08-04
Payer: COMMERCIAL

## 2025-08-04 ENCOUNTER — HOSPITAL ENCOUNTER (OUTPATIENT)
Dept: ULTRASOUND IMAGING | Facility: CLINIC | Age: 55
Discharge: HOME | End: 2025-08-04
Payer: COMMERCIAL

## 2025-08-04 VITALS — BODY MASS INDEX: 20.73 KG/M2 | HEIGHT: 63 IN | WEIGHT: 117 LBS

## 2025-08-04 DIAGNOSIS — R92.8 ABNORMAL MAMMOGRAM: ICD-10-CM

## 2025-08-04 PROCEDURE — 76642 ULTRASOUND BREAST LIMITED: CPT

## 2025-08-04 PROCEDURE — G0279 TOMOSYNTHESIS, MAMMO: HCPCS

## 2025-08-04 PROCEDURE — 77065 DX MAMMO INCL CAD UNI: CPT

## 2025-08-05 ENCOUNTER — OFFICE VISIT (OUTPATIENT)
Dept: SURGICAL ONCOLOGY | Facility: CLINIC | Age: 55
End: 2025-08-05
Payer: COMMERCIAL

## 2025-08-05 VITALS
BODY MASS INDEX: 20.73 KG/M2 | DIASTOLIC BLOOD PRESSURE: 70 MMHG | OXYGEN SATURATION: 98 % | HEART RATE: 66 BPM | SYSTOLIC BLOOD PRESSURE: 130 MMHG | TEMPERATURE: 97.7 F | HEIGHT: 63 IN

## 2025-08-05 DIAGNOSIS — Z12.39 BREAST CANCER SCREENING, HIGH RISK PATIENT: ICD-10-CM

## 2025-08-05 DIAGNOSIS — Z85.3 PERSONAL HISTORY OF ADENOCARCINOMA OF BREAST: ICD-10-CM

## 2025-08-05 DIAGNOSIS — R01.1 MURMUR, CARDIAC: ICD-10-CM

## 2025-08-05 DIAGNOSIS — Z12.31 BREAST CANCER SCREENING BY MAMMOGRAM: Primary | ICD-10-CM

## 2025-08-05 DIAGNOSIS — Z85.3 ENCOUNTER FOR FOLLOW-UP SURVEILLANCE OF BREAST CANCER: ICD-10-CM

## 2025-08-05 DIAGNOSIS — Z08 ENCOUNTER FOR FOLLOW-UP SURVEILLANCE OF BREAST CANCER: ICD-10-CM

## 2025-08-05 PROCEDURE — 99214 OFFICE O/P EST MOD 30 MIN: CPT

## 2025-08-05 RX ORDER — VALACYCLOVIR HYDROCHLORIDE 1 G/1
TABLET, FILM COATED ORAL AS NEEDED
COMMUNITY
Start: 2025-06-04

## 2025-08-12 ENCOUNTER — APPOINTMENT (OUTPATIENT)
Dept: LAB | Facility: MEDICAL CENTER | Age: 55
End: 2025-08-12
Payer: COMMERCIAL

## (undated) DEVICE — DRAPE LAPAROTOMY W/POUCHES

## (undated) DEVICE — CHLORAPREP HI-LITE 26ML ORANGE

## (undated) DEVICE — INTENDED FOR TISSUE SEPARATION, AND OTHER PROCEDURES THAT REQUIRE A SHARP SURGICAL BLADE TO PUNCTURE OR CUT.: Brand: BARD-PARKER SAFETY BLADES SIZE 11, STERILE

## (undated) DEVICE — TISSUE RETRIEVAL SYSTEM: Brand: INZII RETRIEVAL SYSTEM

## (undated) DEVICE — GLOVE PI ULTRA TOUCH SZ.6.5

## (undated) DEVICE — SUT SILK 2-0 SH 30 IN K833H

## (undated) DEVICE — TROCAR: Brand: KII SLEEVE

## (undated) DEVICE — 3000CC GUARDIAN II: Brand: GUARDIAN

## (undated) DEVICE — PACK PBDS MAJOR GYN VAGINAL SLB

## (undated) DEVICE — IRRIG ENDO FLO TUBING

## (undated) DEVICE — LIGHT GLOVE GREEN

## (undated) DEVICE — GLOVE PI ULTRA TOUCH SZ.8.5

## (undated) DEVICE — 3M™ TEGADERM™ TRANSPARENT FILM DRESSING FRAME STYLE, 1627, 4 IN X 10 IN (10 CM X 25 CM), 20/CT 4CT/CASE: Brand: 3M™ TEGADERM™

## (undated) DEVICE — MEDI-VAC YANK SUCT HNDL W/TPRD BULBOUS TIP: Brand: CARDINAL HEALTH

## (undated) DEVICE — INTENDED FOR TISSUE SEPARATION, AND OTHER PROCEDURES THAT REQUIRE A SHARP SURGICAL BLADE TO PUNCTURE OR CUT.: Brand: BARD-PARKER ® CARBON RIB-BACK BLADES

## (undated) DEVICE — SUT ETHILON 2-0 FS 18 IN 664H

## (undated) DEVICE — SUT MONOCRYL 4-0 PS-2 27 IN Y426H

## (undated) DEVICE — 3M™ TEGADERM™ TRANSPARENT FILM DRESSING FRAME STYLE, 1626W, 4 IN X 4-3/4 IN (10 CM X 12 CM), 50/CT 4CT/CASE: Brand: 3M™ TEGADERM™

## (undated) DEVICE — ASTOUND STANDARD SURGICAL GOWN, XL: Brand: CONVERTORS

## (undated) DEVICE — IV EXTENSION TUBING 33 IN

## (undated) DEVICE — JACKSON-PRATT 100CC BULB RESERVOIR: Brand: CARDINAL HEALTH

## (undated) DEVICE — [HIGH FLOW INSUFFLATOR,  DO NOT USE IF PACKAGE IS DAMAGED,  KEEP DRY,  KEEP AWAY FROM SUNLIGHT,  PROTECT FROM HEAT AND RADIOACTIVE SOURCES.]: Brand: PNEUMOSURE

## (undated) DEVICE — SYRINGE 10ML LL CONTROL TOP

## (undated) DEVICE — TRAY FOLEY 16FR URIMETER SURESTEP

## (undated) DEVICE — PREMIUM DRY TRAY LF: Brand: MEDLINE INDUSTRIES, INC.

## (undated) DEVICE — ENSEAL X1 TISSUE SEALER, CURVED JAW, 37 CM SHAFT LENGTH: Brand: ENSEAL

## (undated) DEVICE — DRAPE EQUIPMENT RF WAND

## (undated) DEVICE — ADHESIVE SKIN HIGH VISCOSITY EXOFIN 1ML

## (undated) DEVICE — BLUE HEAT SCOPE WARMER

## (undated) DEVICE — GLOVE INDICATOR PI UNDERGLOVE SZ 7 BLUE

## (undated) DEVICE — MAYO STAND COVER: Brand: CONVERTORS

## (undated) DEVICE — GLOVE INDICATOR PI UNDERGLOVE SZ 8.5 BLUE

## (undated) DEVICE — UTERINE MANIPULATOR RUMI 6.7 X 6 CM

## (undated) DEVICE — GLOVE INDICATOR PI UNDERGLOVE SZ 6.5 BLUE

## (undated) DEVICE — ELECTRODE BALL E-Z CLEAN 2IN -0015

## (undated) DEVICE — BETHLEHEM UNIVERSAL GYN LAP PK: Brand: CARDINAL HEALTH

## (undated) DEVICE — SUT STRATAFIX SPIRAL 2-0 CT-1 30 CM SXPP1B410

## (undated) DEVICE — SCD SEQUENTIAL COMPRESSION COMFORT SLEEVE MEDIUM KNEE LENGTH: Brand: KENDALL SCD

## (undated) DEVICE — ADHESIVE SKIN HIGH VISCOSITY EXOFIN MICRO 0.5ML

## (undated) DEVICE — PENCIL ELECTROSURG E-Z CLEAN -0035H

## (undated) DEVICE — EXIDINE 4 PCT

## (undated) DEVICE — JP CHANNEL DRAIN, 19FR HUBLESS: Brand: CARDINAL HEALTH

## (undated) DEVICE — ELECTRODE LAP J HOOK E-Z CLEAN 33CM-0021

## (undated) DEVICE — NEEDLE SPINAL18G X 3.5 IN QUINCKE

## (undated) DEVICE — OCCLUDER COLPO-PNEUMO

## (undated) DEVICE — SUT VICRYL 0 UR-6 27 IN J603H

## (undated) DEVICE — SYRINGE 50ML LL

## (undated) DEVICE — LAPAROSCOPIC SMOKE EVAC TUBING

## (undated) DEVICE — TRAY FOLEY 16FR URIMETER SILICONE SURESTEP

## (undated) DEVICE — SUT MONOCRYL 4-0 SH 27 IN Y415H

## (undated) DEVICE — BETHLEHEM UNIVERSAL MINOR VAG: Brand: CARDINAL HEALTH

## (undated) DEVICE — 3M™ STERI-DRAPE™ UNDER BUTTOCKS DRAPE WITH POUCH 1084: Brand: STERI-DRAPE™

## (undated) DEVICE — DRAPE TOWEL: Brand: CONVERTORS

## (undated) DEVICE — STERILE 8 INCH PROCTO SWAB: Brand: CARDINAL HEALTH

## (undated) DEVICE — SUT VICRYL 3-0 SH 27 IN J416H

## (undated) DEVICE — 1820 FOAM BLOCK NEEDLE COUNTER: Brand: DEVON

## (undated) DEVICE — NEEDLE 25G X 1 1/2

## (undated) DEVICE — GLOVE INDICATOR PI UNDERGLOVE SZ 9 BLUE

## (undated) DEVICE — GLOVE PI ULTRA TOUCH SZ.7.0

## (undated) DEVICE — ENDOPATH XCEL UNIVERSAL TROCAR STABLILITY SLEEVES: Brand: ENDOPATH XCEL

## (undated) DEVICE — VIAL DECANTER